# Patient Record
Sex: MALE | Race: WHITE | Employment: FULL TIME | URBAN - METROPOLITAN AREA
[De-identification: names, ages, dates, MRNs, and addresses within clinical notes are randomized per-mention and may not be internally consistent; named-entity substitution may affect disease eponyms.]

---

## 2018-03-12 ENCOUNTER — OFFICE VISIT (OUTPATIENT)
Dept: PRIMARY CARE CLINIC | Age: 53
End: 2018-03-12
Payer: COMMERCIAL

## 2018-03-12 VITALS
SYSTOLIC BLOOD PRESSURE: 102 MMHG | BODY MASS INDEX: 34.66 KG/M2 | WEIGHT: 234 LBS | HEIGHT: 69 IN | DIASTOLIC BLOOD PRESSURE: 78 MMHG | HEART RATE: 71 BPM | OXYGEN SATURATION: 95 % | TEMPERATURE: 97.2 F

## 2018-03-12 VITALS
SYSTOLIC BLOOD PRESSURE: 110 MMHG | DIASTOLIC BLOOD PRESSURE: 74 MMHG | WEIGHT: 230 LBS | HEART RATE: 72 BPM | BODY MASS INDEX: 33.97 KG/M2

## 2018-03-12 DIAGNOSIS — E78.5 HYPERLIPIDEMIA, UNSPECIFIED HYPERLIPIDEMIA TYPE: ICD-10-CM

## 2018-03-12 DIAGNOSIS — G47.33 OSA (OBSTRUCTIVE SLEEP APNEA): ICD-10-CM

## 2018-03-12 DIAGNOSIS — J45.30 MILD PERSISTENT ASTHMA, UNSPECIFIED WHETHER COMPLICATED: ICD-10-CM

## 2018-03-12 DIAGNOSIS — M19.90 ARTHRITIS: ICD-10-CM

## 2018-03-12 PROBLEM — J45.909 ASTHMA: Status: ACTIVE | Noted: 2018-03-12

## 2018-03-12 PROCEDURE — 99213 OFFICE O/P EST LOW 20 MIN: CPT | Performed by: INTERNAL MEDICINE

## 2018-03-12 RX ORDER — ALBUTEROL SULFATE 90 UG/1
2 AEROSOL, METERED RESPIRATORY (INHALATION) PRN
COMMUNITY
End: 2019-02-04

## 2018-03-12 ASSESSMENT — ENCOUNTER SYMPTOMS
HEMOPTYSIS: 0
SHORTNESS OF BREATH: 0
EYE DISCHARGE: 0
ORTHOPNEA: 0
NAUSEA: 0
BLOOD IN STOOL: 0
ABDOMINAL PAIN: 0
BLURRED VISION: 0

## 2018-03-12 ASSESSMENT — PATIENT HEALTH QUESTIONNAIRE - PHQ9
1. LITTLE INTEREST OR PLEASURE IN DOING THINGS: 0
SUM OF ALL RESPONSES TO PHQ QUESTIONS 1-9: 0
2. FEELING DOWN, DEPRESSED OR HOPELESS: 0
SUM OF ALL RESPONSES TO PHQ9 QUESTIONS 1 & 2: 0

## 2018-03-12 NOTE — PROGRESS NOTES
Psychiatric: His behavior is normal.       Labs :    Lab Results   Component Value Date    WBC 5.5 02/08/2018    HGB 15.7 02/08/2018    HCT 46.2 02/08/2018     02/08/2018    CHOL 236 (H) 01/02/2017    TRIG 202 (H) 01/02/2017    HDL 72 02/08/2018    ALT 28 02/08/2018    AST 29 02/08/2018     02/08/2018    K 5.0 02/08/2018     02/08/2018    CREATININE 1.0 02/08/2018    BUN 22 (H) 02/08/2018    CO2 31 (H) 02/08/2018    TSH 2.710 02/08/2018    PSA 0.48 02/08/2018    GLUF 89 02/08/2018     Lab Results   Component Value Date    COLORU Yellow 02/08/2018    NITRU Negative 02/08/2018    GLUCOSEU Negative 02/08/2018    KETUA TRACE 02/08/2018    UROBILINOGEN 0.2 02/08/2018    BILIRUBINUR Negative 02/08/2018     Lab Results   Component Value Date    PSA 0.48 02/08/2018             ASSESSMENT     Patient Active Problem List    Diagnosis Date Noted    Hyperlipemia 03/12/2018    Asthma 03/12/2018    Arthritis 03/12/2018    SELENA (obstructive sleep apnea) 03/12/2018    Wrist pain 12/15/2014    TFCC (triangular fibrocartilage complex) tear 12/15/2014    Elbow contusion 02/04/2014    Triceps tendon rupture 02/04/2014    Elbow pain 02/04/2014    Cubital tunnel syndrome 02/04/2014        Diagnosis:     ICD-10-CM ICD-9-CM    1. Hyperlipidemia, unspecified hyperlipidemia type E78.5 272.4    2. Mild persistent asthma, unspecified whether complicated Z45.47 834.38    3. Arthritis M19.90 716.90    4. SELENA (obstructive sleep apnea) G47.33 327.23        PLAN:           Patient Instructions   Low salt, Low Carb diet an low fat diet  Continue medications as advised and take them regularly  Regular exercises as advised    Discussed natural and expected course of this diagnosis and need to alert me if symptoms do not follow expected course, or if any worse. Return in about 3 months (around 6/12/2018).

## 2018-04-27 RX ORDER — FLUTICASONE FUROATE AND VILANTEROL 200; 25 UG/1; UG/1
1 POWDER RESPIRATORY (INHALATION) EVERY MORNING
Qty: 3 EACH | Refills: 1 | Status: SHIPPED | OUTPATIENT
Start: 2018-04-27 | End: 2019-06-10 | Stop reason: SDUPTHER

## 2018-05-21 ENCOUNTER — OFFICE VISIT (OUTPATIENT)
Dept: PRIMARY CARE CLINIC | Age: 53
End: 2018-05-21
Payer: COMMERCIAL

## 2018-05-21 VITALS
SYSTOLIC BLOOD PRESSURE: 126 MMHG | TEMPERATURE: 98.6 F | HEIGHT: 69 IN | BODY MASS INDEX: 34.07 KG/M2 | WEIGHT: 230 LBS | DIASTOLIC BLOOD PRESSURE: 82 MMHG | OXYGEN SATURATION: 97 % | HEART RATE: 68 BPM

## 2018-05-21 DIAGNOSIS — M54.2 NECK PAIN: ICD-10-CM

## 2018-05-21 DIAGNOSIS — G47.33 OSA (OBSTRUCTIVE SLEEP APNEA): ICD-10-CM

## 2018-05-21 DIAGNOSIS — J45.30 MILD PERSISTENT ASTHMA, UNSPECIFIED WHETHER COMPLICATED: ICD-10-CM

## 2018-05-21 DIAGNOSIS — E78.5 HYPERLIPIDEMIA, UNSPECIFIED HYPERLIPIDEMIA TYPE: Primary | ICD-10-CM

## 2018-05-21 DIAGNOSIS — M19.90 ARTHRITIS: ICD-10-CM

## 2018-05-21 PROCEDURE — 99213 OFFICE O/P EST LOW 20 MIN: CPT | Performed by: INTERNAL MEDICINE

## 2018-05-21 RX ORDER — MELOXICAM 15 MG/1
15 TABLET ORAL DAILY
Qty: 90 TABLET | Refills: 0 | Status: CANCELLED | OUTPATIENT
Start: 2018-05-21

## 2018-05-21 RX ORDER — QUETIAPINE FUMARATE 100 MG/1
50 TABLET, FILM COATED ORAL DAILY
Refills: 0 | COMMUNITY
Start: 2018-04-19 | End: 2019-08-26 | Stop reason: SDUPTHER

## 2018-05-21 ASSESSMENT — ENCOUNTER SYMPTOMS
NAUSEA: 0
EYE DISCHARGE: 0
SHORTNESS OF BREATH: 0
BLURRED VISION: 0
ORTHOPNEA: 0
HEMOPTYSIS: 0
BLOOD IN STOOL: 0
ABDOMINAL PAIN: 0

## 2018-05-25 ENCOUNTER — HOSPITAL ENCOUNTER (OUTPATIENT)
Dept: GENERAL RADIOLOGY | Age: 53
Discharge: HOME | End: 2018-05-27
Payer: COMMERCIAL

## 2018-05-25 ENCOUNTER — HOSPITAL ENCOUNTER (OUTPATIENT)
Age: 53
Discharge: HOME | End: 2018-05-27
Payer: COMMERCIAL

## 2018-05-25 DIAGNOSIS — M54.2 NECK PAIN: ICD-10-CM

## 2018-05-25 PROCEDURE — 72050 X-RAY EXAM NECK SPINE 4/5VWS: CPT

## 2018-05-26 ENCOUNTER — HOSPITAL ENCOUNTER (OUTPATIENT)
Age: 53
Discharge: HOME | End: 2018-05-26
Payer: COMMERCIAL

## 2018-05-26 LAB
ALBUMIN SERPL-MCNC: 4.2 G/DL (ref 3.5–5.2)
ALP BLD-CCNC: 48 U/L (ref 40–129)
ALT SERPL-CCNC: 22 U/L (ref 0–40)
ANION GAP SERPL CALCULATED.3IONS-SCNC: 12 MMOL/L (ref 7–16)
AST SERPL-CCNC: 28 U/L (ref 0–39)
BILIRUB SERPL-MCNC: 0.5 MG/DL (ref 0–1.2)
BUN BLDV-MCNC: 15 MG/DL (ref 6–20)
CALCIUM SERPL-MCNC: 8.7 MG/DL (ref 8.6–10.2)
CHLORIDE BLD-SCNC: 101 MMOL/L (ref 98–107)
CHOLESTEROL, FASTING: 205 MG/DL (ref 0–199)
CO2: 27 MMOL/L (ref 22–29)
CREAT SERPL-MCNC: 1.1 MG/DL (ref 0.7–1.2)
GFR AFRICAN AMERICAN: >60
GFR NON-AFRICAN AMERICAN: >60 ML/MIN/1.73
GLUCOSE FASTING: 80 MG/DL (ref 74–109)
HDLC SERPL-MCNC: 64 MG/DL
LDL CHOLESTEROL CALCULATED: 117 MG/DL (ref 0–99)
POTASSIUM SERPL-SCNC: 4.3 MMOL/L (ref 3.5–5)
SODIUM BLD-SCNC: 140 MMOL/L (ref 132–146)
TOTAL PROTEIN: 6.6 G/DL (ref 6.4–8.3)
TRIGLYCERIDE, FASTING: 118 MG/DL (ref 0–149)
VLDLC SERPL CALC-MCNC: 24 MG/DL

## 2018-05-26 PROCEDURE — 80061 LIPID PANEL: CPT

## 2018-05-26 PROCEDURE — 36415 COLL VENOUS BLD VENIPUNCTURE: CPT

## 2018-05-26 PROCEDURE — 80053 COMPREHEN METABOLIC PANEL: CPT

## 2018-06-25 ENCOUNTER — OFFICE VISIT (OUTPATIENT)
Dept: PRIMARY CARE CLINIC | Age: 53
End: 2018-06-25
Payer: COMMERCIAL

## 2018-06-25 VITALS
WEIGHT: 232 LBS | HEIGHT: 69 IN | SYSTOLIC BLOOD PRESSURE: 128 MMHG | BODY MASS INDEX: 34.36 KG/M2 | DIASTOLIC BLOOD PRESSURE: 80 MMHG | TEMPERATURE: 97.9 F | OXYGEN SATURATION: 97 % | HEART RATE: 68 BPM

## 2018-06-25 DIAGNOSIS — G47.33 OSA (OBSTRUCTIVE SLEEP APNEA): ICD-10-CM

## 2018-06-25 DIAGNOSIS — E78.5 HYPERLIPIDEMIA, UNSPECIFIED HYPERLIPIDEMIA TYPE: ICD-10-CM

## 2018-06-25 DIAGNOSIS — M19.90 ARTHRITIS: ICD-10-CM

## 2018-06-25 DIAGNOSIS — M54.2 NECK PAIN: ICD-10-CM

## 2018-06-25 DIAGNOSIS — M47.22 OSTEOARTHRITIS OF SPINE WITH RADICULOPATHY, CERVICAL REGION: Primary | ICD-10-CM

## 2018-06-25 PROCEDURE — 99213 OFFICE O/P EST LOW 20 MIN: CPT | Performed by: INTERNAL MEDICINE

## 2018-06-25 ASSESSMENT — ENCOUNTER SYMPTOMS
NAUSEA: 0
ORTHOPNEA: 0
SHORTNESS OF BREATH: 0
HEMOPTYSIS: 0
BLURRED VISION: 0
ABDOMINAL PAIN: 0
EYE DISCHARGE: 0
BLOOD IN STOOL: 0

## 2018-07-09 ENCOUNTER — OFFICE VISIT (OUTPATIENT)
Dept: PRIMARY CARE CLINIC | Age: 53
End: 2018-07-09
Payer: COMMERCIAL

## 2018-07-09 VITALS
DIASTOLIC BLOOD PRESSURE: 84 MMHG | BODY MASS INDEX: 34.07 KG/M2 | WEIGHT: 230 LBS | SYSTOLIC BLOOD PRESSURE: 124 MMHG | HEIGHT: 69 IN | OXYGEN SATURATION: 97 % | TEMPERATURE: 98.4 F | HEART RATE: 78 BPM

## 2018-07-09 DIAGNOSIS — J45.30 MILD PERSISTENT ASTHMA, UNSPECIFIED WHETHER COMPLICATED: ICD-10-CM

## 2018-07-09 DIAGNOSIS — G47.33 OSA (OBSTRUCTIVE SLEEP APNEA): ICD-10-CM

## 2018-07-09 DIAGNOSIS — M47.22 OSTEOARTHRITIS OF SPINE WITH RADICULOPATHY, CERVICAL REGION: Primary | ICD-10-CM

## 2018-07-09 DIAGNOSIS — E78.5 HYPERLIPIDEMIA, UNSPECIFIED HYPERLIPIDEMIA TYPE: ICD-10-CM

## 2018-07-09 DIAGNOSIS — M47.812 CERVICAL SPINE ARTHRITIS: ICD-10-CM

## 2018-07-09 PROCEDURE — 99213 OFFICE O/P EST LOW 20 MIN: CPT | Performed by: INTERNAL MEDICINE

## 2018-07-09 ASSESSMENT — ENCOUNTER SYMPTOMS
HEMOPTYSIS: 0
EYE DISCHARGE: 0
BLURRED VISION: 0
BLOOD IN STOOL: 0
SHORTNESS OF BREATH: 0
ABDOMINAL PAIN: 0
NAUSEA: 0
ORTHOPNEA: 0

## 2018-07-09 NOTE — PROGRESS NOTES
with patient. There are no Patient Instructions on file for this visit. Return in about 2 months (around 9/9/2018).

## 2018-09-05 ENCOUNTER — INITIAL CONSULT (OUTPATIENT)
Dept: NEUROSURGERY | Age: 53
End: 2018-09-05
Payer: COMMERCIAL

## 2018-09-05 VITALS
DIASTOLIC BLOOD PRESSURE: 63 MMHG | BODY MASS INDEX: 34.51 KG/M2 | SYSTOLIC BLOOD PRESSURE: 116 MMHG | HEART RATE: 62 BPM | WEIGHT: 233 LBS | HEIGHT: 69 IN

## 2018-09-05 DIAGNOSIS — M54.2 NECK PAIN: Primary | ICD-10-CM

## 2018-09-05 PROCEDURE — 99203 OFFICE O/P NEW LOW 30 MIN: CPT | Performed by: PHYSICIAN ASSISTANT

## 2018-09-05 NOTE — PROGRESS NOTES
lymphadenopathy   Abdomen soft, nontender  Pupils equal and reactive, no scleral icterus  EOMI bilaterally  Cranial nerves II-XII intact bilaterally  No drift  5/5 in BUE  5/5 in BLE  Sensation to LT intact x 4 ext  Toes going down  Skin warm and dry    Review of Imaging: MRI of Cervical Spine reviewed from outside facility. Assessment: Patient with foraminal stenosis at C5-C6, C6-C7. Stable. Plan:  -MRI reviewed with patient, no surgical intervention indicated at this time as he has no real radicular component.    -PT  -Pain Management  -RTC PRN

## 2018-09-05 NOTE — LETTER
[address] [facility] [address]  Phone: [phone]  Fax: [phone]    Eliana Mcnally MD        2018       Patient: Walter Nobles   MR Number: <[id number]>   YOB: 1965   Date of Visit: 2018       Dear Dr. Meeta Mckay:    Thank you for the request for consultation for Colby Peck to me for the evaluation. Below are the relevant portions of my assessment and plan of care. Vitals:    18 1441   BP: 116/63   Pulse: 62   Weight: 233 lb (105.7 kg)   Height: 5' 9\" (1.753 m)     Myrtle Jones     Patient: Walter Nobles  : 1965  MRN: [id number]    Date of Service: 2018    Reason for Referral: Neck Pain     History of Present Illness: This is a 46year old white male who presents with  Acute onset neck pain over the past several months. States the pain is sharp, pinching, 7/10, and constant. Admits to some relief of pain with activity modification. States the neck pain is only on the left side and radiates into the head causing moderate-severe headaches. Denies radicular pain into the arms, n/t, or weakness. No gait issues. No loss of bowel or bladder function. Denies trying PT or Pain Management. Allergies:   Patient has no known allergies. Past Medical History:      Diagnosis Date    Asthma     Bipolar 1 disorder ([facility]     Cardiomyopathy ([facility]     Depression     Hyperlipemia     Pneumonia     TFCC (triangular fibrocartilage complex) tear 12/15/2014       Surgical History:      Procedure Laterality Date    CARDIAC CATHETERIZATION      FINGER SURGERY Left     pinkie    HERNIA REPAIR      inguinal and umbical    OTHER SURGICAL HISTORY Right 2014    repair tricepts tondon       Social History:   reports that he quit smoking about 10 years ago. His smoking use included Cigarettes. He uses smokeless tobacco.   reports that he does not drink alcohol.

## 2018-10-08 RX ORDER — ROSUVASTATIN CALCIUM 10 MG/1
10 TABLET, COATED ORAL DAILY
Qty: 90 TABLET | Refills: 0 | Status: SHIPPED | OUTPATIENT
Start: 2018-10-08 | End: 2019-01-17 | Stop reason: SDUPTHER

## 2018-10-12 ENCOUNTER — OFFICE VISIT (OUTPATIENT)
Dept: PAIN MANAGEMENT | Age: 53
End: 2018-10-12
Payer: COMMERCIAL

## 2018-10-12 ENCOUNTER — PREP FOR PROCEDURE (OUTPATIENT)
Dept: PAIN MANAGEMENT | Age: 53
End: 2018-10-12

## 2018-10-12 VITALS
SYSTOLIC BLOOD PRESSURE: 116 MMHG | HEIGHT: 69 IN | TEMPERATURE: 98.2 F | WEIGHT: 226 LBS | HEART RATE: 54 BPM | OXYGEN SATURATION: 98 % | DIASTOLIC BLOOD PRESSURE: 68 MMHG | BODY MASS INDEX: 33.47 KG/M2 | RESPIRATION RATE: 16 BRPM

## 2018-10-12 DIAGNOSIS — M50.90 CERVICAL DISC DISORDER: Primary | ICD-10-CM

## 2018-10-12 DIAGNOSIS — M47.812 CERVICAL FACET JOINT SYNDROME: ICD-10-CM

## 2018-10-12 DIAGNOSIS — G89.4 CHRONIC PAIN SYNDROME: ICD-10-CM

## 2018-10-12 DIAGNOSIS — M47.812 SPONDYLOSIS OF CERVICAL REGION WITHOUT MYELOPATHY OR RADICULOPATHY: ICD-10-CM

## 2018-10-12 DIAGNOSIS — M47.812 CERVICAL FACET JOINT SYNDROME: Primary | ICD-10-CM

## 2018-10-12 PROCEDURE — 99204 OFFICE O/P NEW MOD 45 MIN: CPT | Performed by: PAIN MEDICINE

## 2018-10-12 RX ORDER — INFLUENZA A VIRUS A/SINGAPORE/GP1908/2015 IVR-180A (H1N1) ANTIGEN (PROPIOLACTONE INACTIVATED), INFLUENZA A VIRUS A/SINGAPORE/INFIMH-16-0019/2016 IVR-186 (H3N2) ANTIGEN (PROPIOLACTONE INACTIVATED), INFLUENZA B VIRUS B/MARYLAND/15/2016 ANTIGEN (PROPIOLACTONE INACTIVATED), AND INFLUENZA B VIRUS B/PHUKET/3073/2013 BVR-1B ANTIGEN (PROPIOLACTONE INACTIVATED) 15; 15; 15; 15 UG/.5ML; UG/.5ML; UG/.5ML; UG/.5ML
INJECTION, SUSPENSION INTRAMUSCULAR
Refills: 0 | COMMUNITY
Start: 2018-10-09 | End: 2018-10-12

## 2018-10-12 RX ORDER — SODIUM CHLORIDE 0.9 % (FLUSH) 0.9 %
10 SYRINGE (ML) INJECTION PRN
Status: CANCELLED | OUTPATIENT
Start: 2018-10-12 | End: 2019-10-12

## 2018-10-12 RX ORDER — DIVALPROEX SODIUM 500 MG/1
500 TABLET, EXTENDED RELEASE ORAL
Refills: 0 | COMMUNITY
Start: 2018-10-04 | End: 2018-10-12

## 2018-10-12 RX ORDER — SODIUM CHLORIDE 0.9 % (FLUSH) 0.9 %
10 SYRINGE (ML) INJECTION EVERY 12 HOURS SCHEDULED
Status: CANCELLED | OUTPATIENT
Start: 2018-10-12 | End: 2019-10-12

## 2018-10-26 ENCOUNTER — TELEPHONE (OUTPATIENT)
Dept: PAIN MANAGEMENT | Age: 53
End: 2018-10-26

## 2018-11-01 ENCOUNTER — HOSPITAL ENCOUNTER (OUTPATIENT)
Dept: OPERATING ROOM | Age: 53
Discharge: HOME | End: 2018-11-01
Payer: COMMERCIAL

## 2018-11-01 ENCOUNTER — HOSPITAL ENCOUNTER (OUTPATIENT)
Age: 53
Setting detail: OUTPATIENT SURGERY
Discharge: HOME | End: 2018-11-01
Attending: PAIN MEDICINE | Admitting: PAIN MEDICINE
Payer: COMMERCIAL

## 2018-11-01 VITALS
SYSTOLIC BLOOD PRESSURE: 121 MMHG | DIASTOLIC BLOOD PRESSURE: 81 MMHG | OXYGEN SATURATION: 96 % | RESPIRATION RATE: 16 BRPM | HEART RATE: 58 BPM

## 2018-11-01 DIAGNOSIS — M47.892 OTHER OSTEOARTHRITIS OF SPINE, CERVICAL REGION: ICD-10-CM

## 2018-11-01 PROCEDURE — 7100000010 HC PHASE II RECOVERY - FIRST 15 MIN: Performed by: PAIN MEDICINE

## 2018-11-01 PROCEDURE — 2709999900 HC NON-CHARGEABLE SUPPLY: Performed by: PAIN MEDICINE

## 2018-11-01 PROCEDURE — 3600000005 HC SURGERY LEVEL 5 BASE: Performed by: PAIN MEDICINE

## 2018-11-01 PROCEDURE — 64490 INJ PARAVERT F JNT C/T 1 LEV: CPT | Performed by: PAIN MEDICINE

## 2018-11-01 PROCEDURE — 64491 INJ PARAVERT F JNT C/T 2 LEV: CPT | Performed by: PAIN MEDICINE

## 2018-11-01 PROCEDURE — 6360000002 HC RX W HCPCS: Performed by: PAIN MEDICINE

## 2018-11-01 PROCEDURE — 2500000003 HC RX 250 WO HCPCS: Performed by: PAIN MEDICINE

## 2018-11-01 PROCEDURE — 7100000011 HC PHASE II RECOVERY - ADDTL 15 MIN: Performed by: PAIN MEDICINE

## 2018-11-01 PROCEDURE — 3209999900 FLUORO FOR SURGICAL PROCEDURES

## 2018-11-01 RX ORDER — LIDOCAINE HYDROCHLORIDE 5 MG/ML
INJECTION, SOLUTION INFILTRATION; INTRAVENOUS PRN
Status: DISCONTINUED | OUTPATIENT
Start: 2018-11-01 | End: 2018-11-01 | Stop reason: HOSPADM

## 2018-11-01 RX ORDER — SODIUM CHLORIDE 0.9 % (FLUSH) 0.9 %
10 SYRINGE (ML) INJECTION PRN
Status: DISCONTINUED | OUTPATIENT
Start: 2018-11-01 | End: 2018-11-01 | Stop reason: HOSPADM

## 2018-11-01 RX ORDER — SODIUM CHLORIDE 0.9 % (FLUSH) 0.9 %
10 SYRINGE (ML) INJECTION EVERY 12 HOURS SCHEDULED
Status: DISCONTINUED | OUTPATIENT
Start: 2018-11-01 | End: 2018-11-01 | Stop reason: HOSPADM

## 2018-11-01 ASSESSMENT — PAIN SCALES - GENERAL
PAINLEVEL_OUTOF10: 0
PAINLEVEL_OUTOF10: 0

## 2018-11-01 ASSESSMENT — PAIN DESCRIPTION - DESCRIPTORS: DESCRIPTORS: DISCOMFORT

## 2018-11-01 ASSESSMENT — PAIN - FUNCTIONAL ASSESSMENT: PAIN_FUNCTIONAL_ASSESSMENT: 0-10

## 2018-11-01 NOTE — OP NOTE
2018    Patient: Renea Celeste  :  1965  Age:  46 y.o. Sex:  male     PRE-PROCEDURE DIAGNOSIS: Left  Cervical facet syndrome, cervical spondylosis. POST-PROCEDURE DIAGNOSIS: Same. PROCEDURE: # 1  Left Cervical medial branch blocks at  Levels C3, C4 and C5    SURGEON: UCRLY Puente M.D. ANESTHESIA: Local    ESTIMATED BLOOD LOSS:  None.  ______________________________________________________________________  BRIEF HISTORY:  Rudy Valencia II comes in today for Left cervical facet medial branch block at levels C3, C4 and C5 . The potential complications of this procedure were discussed with him again today. He has elected to undergo the aforementioned procedure. Zahira complete History & Physical examination were reviewed in depth, a copy of which is in the chart. DESCRIPTION OF PROCEDURE:    After confirming written and informed consent, a time-out was performed and Zahira name and date of birth, the procedure to be performed as well as the plan for the location of the needle insertion were confirmed. The patient was brought into the procedure room and placed in the lateral recumbent on the fluoroscopy table. Standard monitors were placed and vital signs were observed throughout the procedure. The area of the cervical spine was prepped with chloraprep and draped in the usual sterile manner. Lateral fluoroscopy views were used to identify and nya the middle of the centroid of the facets of the targeted levels. The spinal needle was directed until bone was contacted at each level. After negative aspiration was confirmed, a solution of marcaine 0.25% and 40 mg DepoMedrol 1 cc was injected equally at the levels. The needles were removed and the patient body part was cleaned and bandage was placed over the needle insertion. Disposition the patient tolerated the procedure well and there were no complications . Vital signs remained stable throughout the procedure.  The patient was

## 2018-12-04 ENCOUNTER — HOSPITAL ENCOUNTER (EMERGENCY)
Age: 53
Discharge: HOME | End: 2018-12-04
Attending: EMERGENCY MEDICINE
Payer: COMMERCIAL

## 2018-12-04 ENCOUNTER — APPOINTMENT (OUTPATIENT)
Dept: CT IMAGING | Age: 53
End: 2018-12-04
Payer: COMMERCIAL

## 2018-12-04 VITALS
HEART RATE: 55 BPM | RESPIRATION RATE: 16 BRPM | SYSTOLIC BLOOD PRESSURE: 136 MMHG | OXYGEN SATURATION: 96 % | DIASTOLIC BLOOD PRESSURE: 73 MMHG | TEMPERATURE: 97.7 F | WEIGHT: 227 LBS | HEIGHT: 69 IN | BODY MASS INDEX: 33.62 KG/M2

## 2018-12-04 DIAGNOSIS — S13.9XXA CERVICAL SPRAIN, INITIAL ENCOUNTER: Primary | ICD-10-CM
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
## 2018-12-04 PROCEDURE — 96372 THER/PROPH/DIAG INJ SC/IM: CPT

## 2018-12-04 PROCEDURE — 6360000002 HC RX W HCPCS: Performed by: EMERGENCY MEDICINE

## 2018-12-04 PROCEDURE — 72125 CT NECK SPINE W/O DYE: CPT

## 2018-12-04 PROCEDURE — 99284 EMERGENCY DEPT VISIT MOD MDM: CPT

## 2018-12-04 RX ORDER — IBUPROFEN 800 MG/1
800 TABLET ORAL EVERY 8 HOURS PRN
Qty: 30 TABLET | Refills: 0 | Status: SHIPPED | OUTPATIENT
Start: 2018-12-04 | End: 2019-06-10 | Stop reason: ALTCHOICE

## 2018-12-04 RX ORDER — DEXAMETHASONE SODIUM PHOSPHATE 10 MG/ML
10 INJECTION, SOLUTION INTRAMUSCULAR; INTRAVENOUS ONCE
Status: COMPLETED | OUTPATIENT
Start: 2018-12-04 | End: 2018-12-04

## 2018-12-04 RX ORDER — KETOROLAC TROMETHAMINE 30 MG/ML
60 INJECTION, SOLUTION INTRAMUSCULAR; INTRAVENOUS ONCE
Status: COMPLETED | OUTPATIENT
Start: 2018-12-04 | End: 2018-12-04

## 2018-12-04 RX ORDER — CYCLOBENZAPRINE HCL 10 MG
10 TABLET ORAL 3 TIMES DAILY PRN
Qty: 15 TABLET | Refills: 0 | Status: SHIPPED | OUTPATIENT
Start: 2018-12-04 | End: 2018-12-09

## 2018-12-04 RX ADMIN — KETOROLAC TROMETHAMINE 60 MG: 30 INJECTION, SOLUTION INTRAMUSCULAR at 18:49

## 2018-12-04 RX ADMIN — DEXAMETHASONE SODIUM PHOSPHATE 10 MG: 10 INJECTION, SOLUTION INTRAMUSCULAR; INTRAVENOUS at 18:49

## 2018-12-04 ASSESSMENT — ENCOUNTER SYMPTOMS
EYE DISCHARGE: 0
WHEEZING: 0
BACK PAIN: 0
EYE PAIN: 0
NAUSEA: 0
SINUS PRESSURE: 0
SHORTNESS OF BREATH: 0
VOMITING: 0
DIARRHEA: 0
COUGH: 0
SORE THROAT: 0
EYE REDNESS: 0
ABDOMINAL PAIN: 0

## 2018-12-04 ASSESSMENT — PAIN DESCRIPTION - PAIN TYPE: TYPE: CHRONIC PAIN

## 2018-12-04 ASSESSMENT — PAIN DESCRIPTION - LOCATION: LOCATION: BACK

## 2018-12-04 ASSESSMENT — PAIN SCALES - GENERAL: PAINLEVEL_OUTOF10: 5

## 2018-12-04 NOTE — ED PROVIDER NOTES
spine fracture. 2. Mild to moderate multilevel cervical spondylosis. 3. Nonspecific reversal of cervical lordosis. Ct Cervical Spine Wo Contrast    Result Date: 12/4/2018  Location: 200 Indication: Neck pain. Comparison: Cervical spine radiograph from 5/25/2018. Technique: Multidetector CT imaging of the cervical spine was performed without administration of intravenous contrast. Coronal and sagittal reformatted images were obtained. Automated dose exposure control was used for this exam. Findings: There is slight nonspecific reversal of cervical lordosis. Vertebral bodies maintain normal height. Alignment is maintained. Atlantodental distance is preserved. The craniocervical junction is normal in appearance. No acute cervical spine fracture is identified. There is no prevertebral soft tissue edema. There is mild multilevel intervertebral disc space narrowing with hypertrophic endplate changes, most pronounced at C5-C6 and C6-C7. There is multilevel uncovertebral hypertrophy resulting in mild multilevel foraminal narrowing. There is bilateral facet hypertrophy at C2-C3, left greater than right, resulting in moderate left foraminal narrowing. Visualized portions of bilateral lung apices are grossly clear. 1. No acute cervical spine fracture. 2. Mild to moderate multilevel cervical spondylosis. 3. Nonspecific reversal of cervical lordosis. ------------------------- NURSING NOTES AND VITALS REVIEWED ---------------------------  Date / Time Roomed:  12/4/2018  5:45 PM  ED Bed Assignment:  21/21    The nursing notes within the ED encounter and vital signs as below have been reviewed.    /73   Pulse 55   Temp 97.7 °F (36.5 °C) (Oral)   Resp 16   Ht 5' 9\" (1.753 m)   Wt 227 lb (103 kg)   SpO2 96%   BMI 33.52 kg/m²   Oxygen Saturation Interpretation: Normal      ------------------------------------------ PROGRESS NOTES ------------------------------------------  I have spoken with the

## 2018-12-11 ENCOUNTER — OFFICE VISIT (OUTPATIENT)
Dept: PRIMARY CARE CLINIC | Age: 53
End: 2018-12-11
Payer: COMMERCIAL

## 2018-12-11 VITALS
WEIGHT: 227 LBS | DIASTOLIC BLOOD PRESSURE: 84 MMHG | TEMPERATURE: 97 F | BODY MASS INDEX: 33.62 KG/M2 | SYSTOLIC BLOOD PRESSURE: 124 MMHG | HEIGHT: 69 IN | OXYGEN SATURATION: 98 % | HEART RATE: 78 BPM

## 2018-12-11 DIAGNOSIS — G47.33 OSA (OBSTRUCTIVE SLEEP APNEA): ICD-10-CM

## 2018-12-11 DIAGNOSIS — Z12.11 COLON CANCER SCREENING: ICD-10-CM

## 2018-12-11 DIAGNOSIS — E78.5 HYPERLIPIDEMIA, UNSPECIFIED HYPERLIPIDEMIA TYPE: Primary | ICD-10-CM

## 2018-12-11 DIAGNOSIS — J45.30 MILD PERSISTENT ASTHMA, UNSPECIFIED WHETHER COMPLICATED: ICD-10-CM

## 2018-12-11 DIAGNOSIS — M47.812 CERVICAL FACET JOINT SYNDROME: ICD-10-CM

## 2018-12-11 DIAGNOSIS — M50.90 CERVICAL DISC DISORDER: ICD-10-CM

## 2018-12-11 PROCEDURE — 99213 OFFICE O/P EST LOW 20 MIN: CPT | Performed by: INTERNAL MEDICINE

## 2018-12-11 RX ORDER — DIVALPROEX SODIUM 500 MG/1
TABLET, EXTENDED RELEASE ORAL
Refills: 0 | COMMUNITY
Start: 2018-11-29 | End: 2019-08-26 | Stop reason: SDUPTHER

## 2018-12-11 ASSESSMENT — ENCOUNTER SYMPTOMS
NAUSEA: 0
BLOOD IN STOOL: 0
SHORTNESS OF BREATH: 0
ABDOMINAL PAIN: 0
EYE DISCHARGE: 0

## 2018-12-11 ASSESSMENT — PATIENT HEALTH QUESTIONNAIRE - PHQ9
SUM OF ALL RESPONSES TO PHQ9 QUESTIONS 1 & 2: 0
SUM OF ALL RESPONSES TO PHQ QUESTIONS 1-9: 0
1. LITTLE INTEREST OR PLEASURE IN DOING THINGS: 0
2. FEELING DOWN, DEPRESSED OR HOPELESS: 0
SUM OF ALL RESPONSES TO PHQ QUESTIONS 1-9: 0

## 2018-12-11 NOTE — PROGRESS NOTES
Chief Complaint   Patient presents with    Hyperlipidemia    Arthritis     patient currently getting steroid injection cervical region/ pain clinic       HPI:  Patient is here for follow-up . Had visit and procedure with Pain clinic. Pt reports that he does not feel much improvement in his pain level     Allergy and Medications are reviewed and updated. Past Medical History, Surgical History, and Family History has been reviewed and updated. Review of Systems:  Review of Systems   Constitutional: Negative for chills and fever. HENT: Negative for congestion and ear pain. Eyes: Negative for discharge. Respiratory: Negative for shortness of breath (No new SOB). Cardiovascular: Negative for chest pain and leg swelling. Gastrointestinal: Negative for abdominal pain, blood in stool and nausea. Endocrine: Negative for polydipsia. Genitourinary: Negative for flank pain and hematuria. Musculoskeletal: Positive for neck pain (Chronic). Negative for myalgias. Skin: Negative for rash. Neurological: Negative for dizziness and seizures. Hematological: Does not bruise/bleed easily. Psychiatric/Behavioral: Negative for hallucinations and suicidal ideas. Vitals:    12/11/18 1023   BP: 124/84   Pulse: 78   Temp: 97 °F (36.1 °C)   TempSrc: Oral   SpO2: 98%   Weight: 227 lb (103 kg)   Height: 5' 9\" (1.753 m)       Physical Exam   Constitutional: He is oriented to person, place, and time. He appears well-developed and well-nourished. HENT:   Head: Normocephalic and atraumatic. Mouth/Throat: Oropharynx is clear and moist.   Eyes: Pupils are equal, round, and reactive to light. Conjunctivae are normal.   Neck: No JVD present. Cardiovascular: Normal rate and regular rhythm. Pulmonary/Chest: Effort normal and breath sounds normal. He has no rales. Abdominal: Soft. Bowel sounds are normal.   Musculoskeletal: Normal range of motion. Lymphadenopathy:     He has no cervical adenopathy.

## 2018-12-26 ENCOUNTER — OFFICE VISIT (OUTPATIENT)
Dept: PAIN MANAGEMENT | Age: 53
End: 2018-12-26
Payer: COMMERCIAL

## 2018-12-26 ENCOUNTER — PREP FOR PROCEDURE (OUTPATIENT)
Dept: PAIN MANAGEMENT | Age: 53
End: 2018-12-26

## 2018-12-26 VITALS
BODY MASS INDEX: 34.07 KG/M2 | DIASTOLIC BLOOD PRESSURE: 80 MMHG | TEMPERATURE: 98.1 F | WEIGHT: 230 LBS | SYSTOLIC BLOOD PRESSURE: 124 MMHG | RESPIRATION RATE: 18 BRPM | HEART RATE: 84 BPM | OXYGEN SATURATION: 95 % | HEIGHT: 69 IN

## 2018-12-26 DIAGNOSIS — G89.4 CHRONIC PAIN SYNDROME: ICD-10-CM

## 2018-12-26 DIAGNOSIS — M50.90 CERVICAL DISC DISORDER: ICD-10-CM

## 2018-12-26 DIAGNOSIS — M47.812 SPONDYLOSIS OF CERVICAL REGION WITHOUT MYELOPATHY OR RADICULOPATHY: ICD-10-CM

## 2018-12-26 DIAGNOSIS — M47.812 CERVICAL FACET JOINT SYNDROME: Primary | ICD-10-CM

## 2018-12-26 PROCEDURE — 99213 OFFICE O/P EST LOW 20 MIN: CPT | Performed by: PAIN MEDICINE

## 2018-12-26 NOTE — PROGRESS NOTES
[facility]  [address] [facility], [address] [place] [address] Wilberto  [phone]    Follow up Note      Dane Sarah OROZCO     Date of Visit:  12/26/2018    CC:  Patient presents for follow up   Chief Complaint   Patient presents with    Neck Pain     HPI:    Pain got better after procedure for 2 weeks  Change in quality of symptoms:no. Medication side effects:not applicable . Recent diagnostic testing:none. Recent interventional procedures:Left C3,4,5 MBB with excellent response 60-70%. .    He has not been on anticoagulation medications to include none and has not been on herbal supplements. He is not diabetic.     Imaging:   Cervical spine Xray 05/2018  Multilevel cervical spondylosis, most pronounced at C5-6 and C6-7, where there is suggestion of moderate Left foraminal narrowing      Previous treatments: Physical Therapy and medications. .          Potential Aberrant Drug-Related Behavior: No    Urine Drug Screening:  None, no opioids started     OARRS report:  12/2018 consistent     Past Medical History:   Diagnosis Date    Asthma     [facility] disorder ([facility] [facility]     Cardiomyopathy ([facility]     Depression     Hyperlipemia     Pneumonia     TFCC (triangular fibrocartilage complex) tear 12/15/2014     Past Surgical History:   Procedure Laterality Date    CARDIAC CATHETERIZATION      FINGER SURGERY Left 1983    pinkie    HERNIA REPAIR      inguinal and umbical    NERVE BLOCK Left 11/01/2018    medial branch block    OTHER SURGICAL HISTORY Right feb 2014    repair tricepts tondon    IN INJ DX/THER AGNT PARAVERT FACET JOINT, CERV/THORAC, 1ST LEVEL Left 11/1/2018    LEFT C3 C4 C5 MEDIAL BRANCH BLOCK performed by Benoit Askew MD at [address] [place] [address]     Prior to Admission medications    Medication Sig Start Date End Date Taking?  Authorizing Provider   divalproex (DEPAKOTE ER) 500 MG extended release tablet take 1 tablet by mouth every morning AND 2 tablets by mouth at bedtime 11/29/18  Yes Historical Provider, MD   rosuvastatin (CRESTOR) 10 MG tablet Take 1 tablet by mouth daily 10/8/18  Yes Chandrika Brady MD   QUEtiapine (SEROQUEL) 100 MG tablet  4/19/18  Yes Historical Provider, MD   Fluticasone Furoate-Vilanterol (BREO ELLIPTA) 200-25 MCG/INH AEPB Inhale 1 puff into the lungs every morning 4/27/18  Yes Chandrika Brady MD   albuterol sulfate  (90 Base) MCG/ACT inhaler Inhale 2 puffs into the lungs as needed for Wheezing   Yes Historical Provider, MD   esomeprazole (NEXIUM) 40 MG capsule Take 40 mg by mouth every morning (before breakfast). Yes Historical Provider, MD   ibuprofen (IBU) 800 MG tablet Take 1 tablet by mouth every 8 hours as needed for Pain 12/4/18 12/14/18  Kamran Donovan MD     No Known Allergies    Social History     Social History    Marital status:      Spouse name: N/A    Number of children: N/A    Years of education: N/A     Occupational History    Not on file. Social History Main Topics    Smoking status: Former Smoker     Types: Cigarettes     Quit date: 1/19/2008    Smokeless tobacco: Current User    Alcohol use No    Drug use: No    Sexual activity: Yes     Partners: Female     Other Topics Concern    Not on file     Social History Narrative    No narrative on file     History reviewed. No pertinent family history. REVIEW OF SYSTEMS:     Francy Lezama denies fever/chills, chest pain, shortness of breath, new bowel or bladder complaints. All other review of systems was negative. PHYSICAL EXAMINATION:      /80   Pulse 84   Temp 98.1 °F (36.7 °C) (Oral)   Resp 18   Ht 5' 9\" (1.753 m)   Wt 230 lb (104.3 kg)   SpO2 95%   BMI 33.97 kg/m²        General:       General appearance:   pleasant and well-hydrated.    , in moderate discomfort and A & O x3  Build:Overweight  Function:Rises from a seated position easily.     HEENT:     Head:normocephalic and atraumatic  Pupils:regular, round and equal.  Sclera: icterus absent,

## 2019-01-17 RX ORDER — ROSUVASTATIN CALCIUM 10 MG/1
10 TABLET, COATED ORAL DAILY
Qty: 90 TABLET | Refills: 0 | Status: SHIPPED | OUTPATIENT
Start: 2019-01-17 | End: 2019-06-10 | Stop reason: ALTCHOICE

## 2019-01-23 ENCOUNTER — TELEPHONE (OUTPATIENT)
Dept: PAIN MANAGEMENT | Age: 54
End: 2019-01-23

## 2019-01-24 ENCOUNTER — HOSPITAL ENCOUNTER (OUTPATIENT)
Age: 54
Setting detail: OUTPATIENT SURGERY
Discharge: HOME | End: 2019-01-24
Attending: PAIN MEDICINE | Admitting: PAIN MEDICINE
Payer: COMMERCIAL

## 2019-01-24 VITALS
SYSTOLIC BLOOD PRESSURE: 113 MMHG | HEART RATE: 74 BPM | RESPIRATION RATE: 16 BRPM | OXYGEN SATURATION: 95 % | DIASTOLIC BLOOD PRESSURE: 69 MMHG

## 2019-01-24 DIAGNOSIS — M47.892 OTHER OSTEOARTHRITIS OF SPINE, CERVICAL REGION: ICD-10-CM

## 2019-01-24 PROCEDURE — 2709999900 HC NON-CHARGEABLE SUPPLY: Performed by: PAIN MEDICINE

## 2019-01-24 PROCEDURE — 64490 INJ PARAVERT F JNT C/T 1 LEV: CPT | Performed by: PAIN MEDICINE

## 2019-01-24 PROCEDURE — 2500000003 HC RX 250 WO HCPCS: Performed by: PAIN MEDICINE

## 2019-01-24 PROCEDURE — 64491 INJ PARAVERT F JNT C/T 2 LEV: CPT | Performed by: PAIN MEDICINE

## 2019-01-24 PROCEDURE — 6360000002 HC RX W HCPCS: Performed by: PAIN MEDICINE

## 2019-01-24 PROCEDURE — 7100000010 HC PHASE II RECOVERY - FIRST 15 MIN: Performed by: PAIN MEDICINE

## 2019-01-24 PROCEDURE — 3600000005 HC SURGERY LEVEL 5 BASE: Performed by: PAIN MEDICINE

## 2019-01-24 RX ORDER — LIDOCAINE HYDROCHLORIDE 5 MG/ML
INJECTION, SOLUTION INFILTRATION; INTRAVENOUS PRN
Status: DISCONTINUED | OUTPATIENT
Start: 2019-01-24 | End: 2019-01-24 | Stop reason: HOSPADM

## 2019-01-24 ASSESSMENT — PAIN SCALES - GENERAL
PAINLEVEL_OUTOF10: 0
PAINLEVEL_OUTOF10: 0

## 2019-01-24 ASSESSMENT — PAIN - FUNCTIONAL ASSESSMENT: PAIN_FUNCTIONAL_ASSESSMENT: 0-10

## 2019-02-04 ENCOUNTER — HOSPITAL ENCOUNTER (EMERGENCY)
Age: 54
Discharge: HOME | End: 2019-02-04
Payer: COMMERCIAL

## 2019-02-04 ENCOUNTER — HOSPITAL ENCOUNTER (OUTPATIENT)
Age: 54
Discharge: HOME | End: 2019-02-04
Payer: COMMERCIAL

## 2019-02-04 VITALS
RESPIRATION RATE: 20 BRPM | SYSTOLIC BLOOD PRESSURE: 118 MMHG | OXYGEN SATURATION: 98 % | HEART RATE: 73 BPM | TEMPERATURE: 98 F | DIASTOLIC BLOOD PRESSURE: 83 MMHG

## 2019-02-04 DIAGNOSIS — J20.9 ACUTE BRONCHITIS, UNSPECIFIED ORGANISM: Primary | ICD-10-CM

## 2019-02-04 DIAGNOSIS — E78.5 HYPERLIPIDEMIA, UNSPECIFIED HYPERLIPIDEMIA TYPE: ICD-10-CM

## 2019-02-04 LAB
ALBUMIN SERPL-MCNC: 4.3 G/DL (ref 3.5–5.2)
ALP BLD-CCNC: 107 U/L (ref 40–129)
ALT SERPL-CCNC: 109 U/L (ref 0–40)
ANION GAP SERPL CALCULATED.3IONS-SCNC: 15 MMOL/L (ref 7–16)
AST SERPL-CCNC: 55 U/L (ref 0–39)
BACTERIA: ABNORMAL /HPF
BASOPHILS ABSOLUTE: 0.04 E9/L (ref 0–0.2)
BASOPHILS RELATIVE PERCENT: 0.5 % (ref 0–2)
BILIRUB SERPL-MCNC: 0.3 MG/DL (ref 0–1.2)
BILIRUBIN URINE: NEGATIVE
BLOOD, URINE: ABNORMAL
BUN BLDV-MCNC: 16 MG/DL (ref 6–20)
CALCIUM SERPL-MCNC: 9.3 MG/DL (ref 8.6–10.2)
CHLORIDE BLD-SCNC: 97 MMOL/L (ref 98–107)
CHOLESTEROL, FASTING: 178 MG/DL (ref 0–199)
CLARITY: CLEAR
CO2: 28 MMOL/L (ref 22–29)
COLOR: YELLOW
CREAT SERPL-MCNC: 1.1 MG/DL (ref 0.7–1.2)
EOSINOPHILS ABSOLUTE: 0.19 E9/L (ref 0.05–0.5)
EOSINOPHILS RELATIVE PERCENT: 2.3 % (ref 0–6)
EPITHELIAL CELLS, UA: ABNORMAL /HPF
GFR AFRICAN AMERICAN: >60
GFR NON-AFRICAN AMERICAN: >60 ML/MIN/1.73
GLUCOSE FASTING: 90 MG/DL (ref 74–99)
GLUCOSE URINE: NEGATIVE MG/DL
HCT VFR BLD CALC: 46.3 % (ref 37–54)
HDLC SERPL-MCNC: 61 MG/DL
HEMOGLOBIN: 15.9 G/DL (ref 12.5–16.5)
IMMATURE GRANULOCYTES #: 0.03 E9/L
IMMATURE GRANULOCYTES %: 0.4 % (ref 0–5)
KETONES, URINE: ABNORMAL MG/DL
LDL CHOLESTEROL CALCULATED: 93 MG/DL (ref 0–99)
LEUKOCYTE ESTERASE, URINE: NEGATIVE
LYMPHOCYTES ABSOLUTE: 1.35 E9/L (ref 1.5–4)
LYMPHOCYTES RELATIVE PERCENT: 16.3 % (ref 20–42)
MCH RBC QN AUTO: 30.9 PG (ref 26–35)
MCHC RBC AUTO-ENTMCNC: 34.3 % (ref 32–34.5)
MCV RBC AUTO: 90.1 FL (ref 80–99.9)
MONOCYTES ABSOLUTE: 0.79 E9/L (ref 0.1–0.95)
MONOCYTES RELATIVE PERCENT: 9.6 % (ref 2–12)
NEUTROPHILS ABSOLUTE: 5.87 E9/L (ref 1.8–7.3)
NEUTROPHILS RELATIVE PERCENT: 70.9 % (ref 43–80)
NITRITE, URINE: NEGATIVE
PDW BLD-RTO: 12.7 FL (ref 11.5–15)
PH UA: 6 (ref 5–9)
PLATELET # BLD: 197 E9/L (ref 130–450)
PMV BLD AUTO: 9.8 FL (ref 7–12)
POTASSIUM SERPL-SCNC: 4.6 MMOL/L (ref 3.5–5)
PROTEIN UA: ABNORMAL MG/DL
RBC # BLD: 5.14 E12/L (ref 3.8–5.8)
RBC UA: ABNORMAL /HPF (ref 0–2)
SODIUM BLD-SCNC: 140 MMOL/L (ref 132–146)
SPECIFIC GRAVITY UA: 1.02 (ref 1–1.03)
TOTAL PROTEIN: 7.7 G/DL (ref 6.4–8.3)
TRIGLYCERIDE, FASTING: 119 MG/DL (ref 0–149)
TSH SERPL DL<=0.05 MIU/L-ACNC: 3.2 UIU/ML (ref 0.27–4.2)
UROBILINOGEN, URINE: 0.2 E.U./DL
VLDLC SERPL CALC-MCNC: 24 MG/DL
WBC # BLD: 8.3 E9/L (ref 4.5–11.5)
WBC UA: ABNORMAL /HPF (ref 0–5)

## 2019-02-04 PROCEDURE — 85025 COMPLETE CBC W/AUTO DIFF WBC: CPT

## 2019-02-04 PROCEDURE — 80061 LIPID PANEL: CPT

## 2019-02-04 PROCEDURE — 84443 ASSAY THYROID STIM HORMONE: CPT

## 2019-02-04 PROCEDURE — 99212 OFFICE O/P EST SF 10 MIN: CPT

## 2019-02-04 PROCEDURE — 81001 URINALYSIS AUTO W/SCOPE: CPT

## 2019-02-04 PROCEDURE — 80053 COMPREHEN METABOLIC PANEL: CPT

## 2019-02-04 PROCEDURE — 36415 COLL VENOUS BLD VENIPUNCTURE: CPT

## 2019-02-04 RX ORDER — PREDNISONE 20 MG/1
TABLET ORAL
Qty: 10 TABLET | Refills: 0 | Status: SHIPPED | OUTPATIENT
Start: 2019-02-04 | End: 2019-06-08 | Stop reason: ALTCHOICE

## 2019-02-04 RX ORDER — DOXYCYCLINE 100 MG/1
100 CAPSULE ORAL 2 TIMES DAILY
Qty: 14 CAPSULE | Refills: 0 | Status: SHIPPED | OUTPATIENT
Start: 2019-02-04 | End: 2019-02-11

## 2019-02-04 RX ORDER — ALBUTEROL SULFATE 90 UG/1
2 AEROSOL, METERED RESPIRATORY (INHALATION) PRN
Qty: 1 INHALER | Refills: 0 | Status: SHIPPED | OUTPATIENT
Start: 2019-02-04 | End: 2021-01-13 | Stop reason: SDUPTHER

## 2019-02-07 ENCOUNTER — OFFICE VISIT (OUTPATIENT)
Dept: PRIMARY CARE CLINIC | Age: 54
End: 2019-02-07
Payer: COMMERCIAL

## 2019-02-07 VITALS
TEMPERATURE: 98.7 F | DIASTOLIC BLOOD PRESSURE: 78 MMHG | WEIGHT: 233 LBS | HEIGHT: 69 IN | OXYGEN SATURATION: 96 % | HEART RATE: 78 BPM | BODY MASS INDEX: 34.51 KG/M2 | SYSTOLIC BLOOD PRESSURE: 118 MMHG

## 2019-02-07 DIAGNOSIS — J45.21 MILD INTERMITTENT ASTHMATIC BRONCHITIS WITH ACUTE EXACERBATION: Primary | ICD-10-CM

## 2019-02-07 DIAGNOSIS — J45.30 MILD PERSISTENT ASTHMA, UNSPECIFIED WHETHER COMPLICATED: ICD-10-CM

## 2019-02-07 DIAGNOSIS — E78.5 HYPERLIPIDEMIA, UNSPECIFIED HYPERLIPIDEMIA TYPE: ICD-10-CM

## 2019-02-07 PROCEDURE — 99213 OFFICE O/P EST LOW 20 MIN: CPT | Performed by: INTERNAL MEDICINE

## 2019-02-07 RX ORDER — IPRATROPIUM BROMIDE AND ALBUTEROL SULFATE 2.5; .5 MG/3ML; MG/3ML
1 SOLUTION RESPIRATORY (INHALATION) EVERY 6 HOURS PRN
COMMUNITY
End: 2019-02-07 | Stop reason: SDUPTHER

## 2019-02-07 RX ORDER — IPRATROPIUM BROMIDE AND ALBUTEROL SULFATE 2.5; .5 MG/3ML; MG/3ML
1 SOLUTION RESPIRATORY (INHALATION) EVERY 6 HOURS
Qty: 120 VIAL | Refills: 2 | Status: SHIPPED | OUTPATIENT
Start: 2019-02-07 | End: 2019-06-10 | Stop reason: SDUPTHER

## 2019-02-07 RX ORDER — DEXTROMETHORPHAN HYDROBROMIDE AND PROMETHAZINE HYDROCHLORIDE 15; 6.25 MG/5ML; MG/5ML
5 SYRUP ORAL 4 TIMES DAILY PRN
Qty: 180 ML | Refills: 0 | Status: SHIPPED | OUTPATIENT
Start: 2019-02-07 | End: 2019-02-14

## 2019-02-07 RX ORDER — METHYLPREDNISOLONE 4 MG/1
TABLET ORAL
Qty: 1 KIT | Refills: 0 | Status: SHIPPED | OUTPATIENT
Start: 2019-02-07 | End: 2019-06-08 | Stop reason: ALTCHOICE

## 2019-02-07 ASSESSMENT — PATIENT HEALTH QUESTIONNAIRE - PHQ9
SUM OF ALL RESPONSES TO PHQ9 QUESTIONS 1 & 2: 0
SUM OF ALL RESPONSES TO PHQ QUESTIONS 1-9: 0
2. FEELING DOWN, DEPRESSED OR HOPELESS: 0
SUM OF ALL RESPONSES TO PHQ QUESTIONS 1-9: 0
1. LITTLE INTEREST OR PLEASURE IN DOING THINGS: 0

## 2019-02-07 ASSESSMENT — ENCOUNTER SYMPTOMS
BLOOD IN STOOL: 0
NAUSEA: 0
EYE DISCHARGE: 0
COUGH: 1
WHEEZING: 1
SHORTNESS OF BREATH: 1
ABDOMINAL PAIN: 0

## 2019-02-25 ENCOUNTER — TELEPHONE (OUTPATIENT)
Dept: FAMILY MEDICINE CLINIC | Age: 54
End: 2019-02-25

## 2019-06-08 ENCOUNTER — HOSPITAL ENCOUNTER (EMERGENCY)
Age: 54
Discharge: HOME | End: 2019-06-08
Payer: COMMERCIAL

## 2019-06-08 ENCOUNTER — APPOINTMENT (OUTPATIENT)
Dept: GENERAL RADIOLOGY | Age: 54
End: 2019-06-08
Payer: COMMERCIAL

## 2019-06-08 VITALS
SYSTOLIC BLOOD PRESSURE: 100 MMHG | BODY MASS INDEX: 33.23 KG/M2 | OXYGEN SATURATION: 93 % | RESPIRATION RATE: 20 BRPM | DIASTOLIC BLOOD PRESSURE: 62 MMHG | TEMPERATURE: 98.1 F | HEART RATE: 96 BPM | WEIGHT: 225 LBS

## 2019-06-08 DIAGNOSIS — J40 BRONCHITIS: ICD-10-CM

## 2019-06-08 DIAGNOSIS — J45.909 ASTHMA, UNSPECIFIED ASTHMA SEVERITY, UNSPECIFIED WHETHER COMPLICATED, UNSPECIFIED WHETHER PERSISTENT: Primary | ICD-10-CM

## 2019-06-08 PROCEDURE — 99213 OFFICE O/P EST LOW 20 MIN: CPT

## 2019-06-08 PROCEDURE — 6360000002 HC RX W HCPCS: Performed by: NURSE PRACTITIONER

## 2019-06-08 PROCEDURE — 94640 AIRWAY INHALATION TREATMENT: CPT

## 2019-06-08 PROCEDURE — 96372 THER/PROPH/DIAG INJ SC/IM: CPT

## 2019-06-08 PROCEDURE — 6370000000 HC RX 637 (ALT 250 FOR IP): Performed by: NURSE PRACTITIONER

## 2019-06-08 PROCEDURE — 71046 X-RAY EXAM CHEST 2 VIEWS: CPT

## 2019-06-08 RX ORDER — DOXYCYCLINE HYCLATE 100 MG
100 TABLET ORAL 2 TIMES DAILY
Qty: 20 TABLET | Refills: 0 | Status: SHIPPED | OUTPATIENT
Start: 2019-06-08 | End: 2019-06-18

## 2019-06-08 RX ORDER — BENZONATATE 200 MG/1
200 CAPSULE ORAL 3 TIMES DAILY PRN
Qty: 15 CAPSULE | Refills: 0 | Status: SHIPPED | OUTPATIENT
Start: 2019-06-08 | End: 2019-06-13

## 2019-06-08 RX ORDER — METHYLPREDNISOLONE SODIUM SUCCINATE 125 MG/2ML
125 INJECTION, POWDER, LYOPHILIZED, FOR SOLUTION INTRAMUSCULAR; INTRAVENOUS ONCE
Status: COMPLETED | OUTPATIENT
Start: 2019-06-08 | End: 2019-06-08

## 2019-06-08 RX ORDER — IPRATROPIUM BROMIDE AND ALBUTEROL SULFATE 2.5; .5 MG/3ML; MG/3ML
1 SOLUTION RESPIRATORY (INHALATION) ONCE
Status: COMPLETED | OUTPATIENT
Start: 2019-06-08 | End: 2019-06-08

## 2019-06-08 RX ORDER — PREDNISONE 20 MG/1
40 TABLET ORAL DAILY
Qty: 10 TABLET | Refills: 0 | Status: SHIPPED | OUTPATIENT
Start: 2019-06-08 | End: 2019-06-13

## 2019-06-08 RX ADMIN — IBUPROFEN 600 MG: 200 TABLET, FILM COATED ORAL at 09:49

## 2019-06-08 RX ADMIN — METHYLPREDNISOLONE SODIUM SUCCINATE 125 MG: 125 INJECTION, POWDER, FOR SOLUTION INTRAMUSCULAR; INTRAVENOUS at 09:01

## 2019-06-08 RX ADMIN — IPRATROPIUM BROMIDE AND ALBUTEROL SULFATE 1 AMPULE: .5; 3 SOLUTION RESPIRATORY (INHALATION) at 09:49

## 2019-06-08 RX ADMIN — IPRATROPIUM BROMIDE AND ALBUTEROL SULFATE 1 AMPULE: .5; 3 SOLUTION RESPIRATORY (INHALATION) at 09:01

## 2019-06-08 ASSESSMENT — PAIN SCALES - GENERAL
PAINLEVEL_OUTOF10: 7
PAINLEVEL_OUTOF10: 7

## 2019-06-08 ASSESSMENT — PAIN DESCRIPTION - LOCATION: LOCATION: GENERALIZED

## 2019-06-08 ASSESSMENT — PAIN DESCRIPTION - DESCRIPTORS: DESCRIPTORS: ACHING

## 2019-06-08 NOTE — ED NOTES
2nd duoneb aerosol ended. Pt tolerated well. States he feels better. Pulse ox 95% pulse 104. Provider notified.      Luis Reese, FAVIO  24/00/74 5550

## 2019-06-08 NOTE — ED NOTES
Duoneb aerosol treatment ended. Pt tolerated well. Pulse ox 91% Pulse 105. Provider notified.      Matthew Ly LPN  26/95/21 2494

## 2019-06-08 NOTE — ED PROVIDER NOTES
HPI: Kodak Cotto II 53 y.o. Mago petersen past medical history of   Past Medical History:   Diagnosis Date    Asthma     [facility]     Cardiomyopathy ([facility]     Depression     Hyperlipemia     Pneumonia     TFCC (triangular fibrocartilage complex) tear 12/15/2014    presents with cough and wheezing. He said his asthma is flared up. He said last time he had this his Dr put him on albuterol and  on steroids. He said he is achy all over. He said he is not having chest pain but he does feel more short of breath and his lungs are very tight. He said his asthma is usually well controlled but when he gets sick like this in its sometimes not controlled and he's even had to be admitted for before he has had pneumonia in the past.      Denies chest pain, shortness or breath, post tussive emesis, or hemoptysis.       Review of Systems:   Pertinent positives and negatives are stated within HPI, all other systems reviewed and are negative.          --------------------------------------------- PAST HISTORY ---------------------------------------------  Past Medical History:  has a past medical history of Asthma, Bipolar 1 disorder ([facility] [address] Cardiomyopathy ([facility] Depression, Hyperlipemia, Pneumonia, and TFCC (triangular fibrocartilage complex) tear. Past Surgical History:  has a past surgical history that includes Cardiac catheterization; hernia repair; Finger surgery (Left, 1983); other surgical history (Right, feb 2014); Nerve Block (Left, 11/01/2018); pr inj dx/ther agnt paravert facet joint, cerv/thorac, 1st level (Left, 11/1/2018); Nerve Block (Left, 01/24/2019); and Anesthesia Nerve Block (Left, 1/24/2019). Social History:  reports that he quit smoking about 11 years ago. His smoking use included cigarettes. He uses smokeless tobacco. He reports that he does not drink alcohol or use drugs. Family History: family history is not on file.      The patients home medications have been reviewed. Allergies: Patient has no known allergies. ---------------------------------------------------PHYSICAL EXAM--------------------------------------    Constitutional/General: Alert and oriented x3, appears short of breath, has difficulty speaking complete sentences, non toxic in NAD  Head: Normocephalic and atraumatic  Eyes: conjunctiva clear  Mouth: Oropharynx clear, handling secretions, no trismus  Neck: Supple, full ROM, non tender to palpation in the midline, no stridor, no crepitus, no meningeal signs  Pulmonary: Lungs, dimnshed with wheezing throughout   Cardiovascular:  Regular rate. Regular rhythm. No murmurs, gallops, or rubs. 2+ distal pulses  Chest: no chest wall tenderness  Abdomen: Soft. Non tender. Non distended. Musculoskeletal: Moves all extremities x 4. Warm and well perfused, no clubbing, cyanosis, or edema. Capillary refill <3 seconds  Skin: warm and dry. No rashes. Neurologic: GCS 15  Psych: Normal Affect      -------------------------------------------------- RESULTS -------------------------------------------------    LABS:  No results found for this visit on 06/08/19. RADIOLOGY:  Interpreted by Radiologist.  XR CHEST STANDARD (2 VW)   Final Result   No acute cardiopulmonary disease, without acute infiltrate   or significant change since 2/8/2017 .                    ------------------------- NURSING NOTES AND VITALS REVIEWED ---------------------------   The nursing notes within the ED encounter and vital signs as below have been reviewed. /62   Pulse 96   Temp 98.1 °F (36.7 °C) (Oral)   Resp 20   Wt 225 lb (102.1 kg)   SpO2 93%   BMI 33.23 kg/m²   Oxygen Saturation Interpretation: Normal    The patients available past medical records and past encounters were reviewed.         ------------------------------ ED COURSE/MEDICAL DECISION MAKING----------------------  Medications   ipratropium-albuterol (DUONEB) nebulizer solution 1 ampule (1 ampule Inhalation Given 6/8/19 0901)   methylPREDNISolone sodium (SOLU-MEDROL) injection 125 mg (125 mg Intramuscular Given 6/8/19 0901)             Medical Decision Making:    Exam and history c/w bronchitis.  No evidence of localizing infection or PNA.  Did do a chest x-ray and it was negative. He is wheezing throughout and diminished throughout he was given a DuoNeb and Solu-Medrol. Following the breathing treatment I did review this into his lungs and he sounds much clearer he is improved airflow and the wheezing is gone. Chest x-ray was negative I will put him on prednisone and also doxycycline and Tessalon advised if he has any worsening symptoms or he feels like he is not getting better he needs to go to the ED for evaluation. Counseling: The emergency provider has spoken with the patient and discussed todays results, in addition to providing specific details for the plan of care and counseling regarding the diagnosis and prognosis. Questions are answered at this time and they are agreeable with the plan.       --------------------------------- IMPRESSION AND DISPOSITION ---------------------------------    IMPRESSION  1. Asthma, unspecified asthma severity, unspecified whether complicated, unspecified whether persistent    2.  Bronchitis        DISPOSITION  Disposition: Discharge to home  Patient condition is good           MELANIE Black - JANES  06/08/19 7936

## 2019-06-10 ENCOUNTER — OFFICE VISIT (OUTPATIENT)
Dept: FAMILY MEDICINE CLINIC | Age: 54
End: 2019-06-10
Payer: COMMERCIAL

## 2019-06-10 VITALS
SYSTOLIC BLOOD PRESSURE: 102 MMHG | WEIGHT: 232 LBS | DIASTOLIC BLOOD PRESSURE: 60 MMHG | HEIGHT: 69 IN | BODY MASS INDEX: 34.36 KG/M2 | TEMPERATURE: 97.8 F | HEART RATE: 82 BPM | OXYGEN SATURATION: 94 %

## 2019-06-10 DIAGNOSIS — J45.41 MODERATE PERSISTENT ASTHMA WITH EXACERBATION: Primary | ICD-10-CM

## 2019-06-10 DIAGNOSIS — E78.49 OTHER HYPERLIPIDEMIA: ICD-10-CM

## 2019-06-10 DIAGNOSIS — J45.30 MILD PERSISTENT ASTHMA, UNSPECIFIED WHETHER COMPLICATED: ICD-10-CM

## 2019-06-10 PROCEDURE — 99213 OFFICE O/P EST LOW 20 MIN: CPT | Performed by: INTERNAL MEDICINE

## 2019-06-10 PROCEDURE — 96372 THER/PROPH/DIAG INJ SC/IM: CPT | Performed by: INTERNAL MEDICINE

## 2019-06-10 RX ORDER — DEXTROMETHORPHAN HYDROBROMIDE AND PROMETHAZINE HYDROCHLORIDE 15; 6.25 MG/5ML; MG/5ML
5 SYRUP ORAL 4 TIMES DAILY PRN
Qty: 180 ML | Refills: 0 | Status: SHIPPED | OUTPATIENT
Start: 2019-06-10 | End: 2019-06-17

## 2019-06-10 RX ORDER — METHYLPREDNISOLONE ACETATE 80 MG/ML
80 INJECTION, SUSPENSION INTRA-ARTICULAR; INTRALESIONAL; INTRAMUSCULAR; SOFT TISSUE ONCE
Status: COMPLETED | OUTPATIENT
Start: 2019-06-10 | End: 2019-06-10

## 2019-06-10 RX ORDER — FLUTICASONE FUROATE AND VILANTEROL 200; 25 UG/1; UG/1
1 POWDER RESPIRATORY (INHALATION) EVERY MORNING
Qty: 3 EACH | Refills: 1 | Status: SHIPPED
Start: 2019-06-10 | End: 2020-06-29 | Stop reason: SDUPTHER

## 2019-06-10 RX ORDER — IPRATROPIUM BROMIDE AND ALBUTEROL SULFATE 2.5; .5 MG/3ML; MG/3ML
1 SOLUTION RESPIRATORY (INHALATION) EVERY 6 HOURS
Qty: 120 VIAL | Refills: 2 | Status: SHIPPED | OUTPATIENT
Start: 2019-06-10 | End: 2021-01-13 | Stop reason: SDUPTHER

## 2019-06-10 RX ADMIN — METHYLPREDNISOLONE ACETATE 80 MG: 80 INJECTION, SUSPENSION INTRA-ARTICULAR; INTRALESIONAL; INTRAMUSCULAR; SOFT TISSUE at 11:25

## 2019-06-10 ASSESSMENT — ENCOUNTER SYMPTOMS
WHEEZING: 1
BLOOD IN STOOL: 0
COUGH: 1
SHORTNESS OF BREATH: 1
NAUSEA: 0
EYE DISCHARGE: 0
ABDOMINAL PAIN: 0

## 2019-06-10 NOTE — PROGRESS NOTES
Skin is warm and dry. Psychiatric: His behavior is normal.   Vitals reviewed. Labs :    Lab Results   Component Value Date    WBC 8.3 02/04/2019    HGB 15.9 02/04/2019    HCT 46.3 02/04/2019     02/04/2019    CHOL 236 (H) 01/02/2017    TRIG 202 (H) 01/02/2017    HDL 61 02/04/2019     (H) 02/04/2019    AST 55 (H) 02/04/2019     02/04/2019    K 4.6 02/04/2019    CL 97 (L) 02/04/2019    CREATININE 1.1 02/04/2019    BUN 16 02/04/2019    CO2 28 02/04/2019    TSH 3.200 02/04/2019    PSA 0.48 02/08/2018    GLUF 90 02/04/2019     Lab Results   Component Value Date    COLORU Yellow 02/04/2019    NITRU Negative 02/04/2019    GLUCOSEU Negative 02/04/2019    KETUA TRACE 02/04/2019    UROBILINOGEN 0.2 02/04/2019    BILIRUBINUR Negative 02/04/2019     Lab Results   Component Value Date    PSA 0.48 02/08/2018             ASSESSMENT     Patient Active Problem List    Diagnosis Date Noted    Spondylarthrosis     Chronic pain syndrome 10/12/2018    Cervical disc disorder 10/12/2018    Cervical facet joint syndrome 10/12/2018    Spondylosis of cervical region without myelopathy or radiculopathy 10/12/2018    Neck pain 05/21/2018    Hyperlipemia 03/12/2018    Asthma 03/12/2018    Arthritis 03/12/2018    SELENA (obstructive sleep apnea) 03/12/2018    Wrist pain 12/15/2014    TFCC (triangular fibrocartilage complex) tear 12/15/2014    Elbow contusion 02/04/2014    Triceps tendon rupture 02/04/2014    Elbow pain 02/04/2014    Cubital tunnel syndrome 02/04/2014        Diagnosis:     ICD-10-CM    1. Moderate persistent asthma with exacerbation J45.41    2. Mild persistent asthma, unspecified whether complicated I65.10    3. Other hyperlipidemia E78.49        PLAN:        Depo medrol 80 mg IM    Cont treatment  Promethazine DM as needed    Pt is stable on current medical treatment. Continue current treatment plan    Side effects/Adverse effects/Precautions are reviewed with patient.      Low salt, Low Carb diet an low fat diet  Continue medications as advised and take them regularly  Regular exercises as advised    Discussed natural and expected course of this diagnosis and need to alert me if symptoms do not follow expected course, or if any worse. There are no Patient Instructions on file for this visit. Return in about 2 weeks (around 6/24/2019).

## 2019-06-12 ENCOUNTER — TELEPHONE (OUTPATIENT)
Dept: FAMILY MEDICINE CLINIC | Age: 54
End: 2019-06-12

## 2019-06-12 RX ORDER — METHYLPREDNISOLONE 4 MG/1
TABLET ORAL
Qty: 1 KIT | Refills: 0 | Status: SHIPPED | OUTPATIENT
Start: 2019-06-12 | End: 2019-06-24 | Stop reason: ALTCHOICE

## 2019-06-24 ENCOUNTER — OFFICE VISIT (OUTPATIENT)
Dept: FAMILY MEDICINE CLINIC | Age: 54
End: 2019-06-24
Payer: COMMERCIAL

## 2019-06-24 VITALS
SYSTOLIC BLOOD PRESSURE: 116 MMHG | BODY MASS INDEX: 33.62 KG/M2 | WEIGHT: 227 LBS | OXYGEN SATURATION: 99 % | DIASTOLIC BLOOD PRESSURE: 64 MMHG | HEART RATE: 97 BPM | HEIGHT: 69 IN

## 2019-06-24 DIAGNOSIS — J45.41 MODERATE PERSISTENT ASTHMA WITH EXACERBATION: Primary | ICD-10-CM

## 2019-06-24 DIAGNOSIS — E78.49 OTHER HYPERLIPIDEMIA: ICD-10-CM

## 2019-06-24 PROCEDURE — 99213 OFFICE O/P EST LOW 20 MIN: CPT | Performed by: INTERNAL MEDICINE

## 2019-06-24 ASSESSMENT — ENCOUNTER SYMPTOMS
ABDOMINAL PAIN: 0
SHORTNESS OF BREATH: 0
NAUSEA: 0
EYE DISCHARGE: 0
BLOOD IN STOOL: 0

## 2019-06-24 NOTE — PROGRESS NOTES
Chief Complaint   Patient presents with    Asthma     2 wk f/u, Feeling better. Finished medication gave at last visit.  Health Maintenance     Colonoscopy was completed, sending fax request for it. HPI:  Patient is here for follow-up  Feel much better       Allergy and Medications are reviewed and updated. Past Medical History, Surgical History, and Family History has been reviewed and updated. Review of Systems:  Review of Systems   Constitutional: Negative for chills and fever. HENT: Negative for congestion and ear pain. Eyes: Negative for discharge. Respiratory: Negative for shortness of breath (No new SOB). Cardiovascular: Negative for chest pain and leg swelling. Gastrointestinal: Negative for abdominal pain, blood in stool and nausea. Endocrine: Negative for polydipsia. Genitourinary: Negative for flank pain and hematuria. Musculoskeletal: Negative for myalgias and neck pain. Skin: Negative for rash. Neurological: Negative for dizziness and seizures. Hematological: Does not bruise/bleed easily. Psychiatric/Behavioral: Negative for hallucinations and suicidal ideas. Vitals:    06/24/19 1427   BP: 116/64   Pulse: 97   SpO2: 99%   Weight: 227 lb (103 kg)   Height: 5' 9\" (1.753 m)       Physical Exam   Constitutional: He is oriented to person, place, and time. He appears well-developed and well-nourished. HENT:   Head: Normocephalic and atraumatic. Mouth/Throat: Oropharynx is clear and moist.   Eyes: Pupils are equal, round, and reactive to light. Conjunctivae are normal.   Neck: No JVD present. Cardiovascular: Normal rate and regular rhythm. Pulmonary/Chest: Effort normal and breath sounds normal. He has no rales. Abdominal: Soft. Bowel sounds are normal.   Musculoskeletal: Normal range of motion. Lymphadenopathy:     He has no cervical adenopathy. Neurological: He is alert and oriented to person, place, and time. Skin: Skin is warm and dry. Psychiatric: His behavior is normal.   Vitals reviewed. Labs :    Lab Results   Component Value Date    WBC 8.3 02/04/2019    HGB 15.9 02/04/2019    HCT 46.3 02/04/2019     02/04/2019    CHOL 236 (H) 01/02/2017    TRIG 202 (H) 01/02/2017    HDL 61 02/04/2019     (H) 02/04/2019    AST 55 (H) 02/04/2019     02/04/2019    K 4.6 02/04/2019    CL 97 (L) 02/04/2019    CREATININE 1.1 02/04/2019    BUN 16 02/04/2019    CO2 28 02/04/2019    TSH 3.200 02/04/2019    PSA 0.48 02/08/2018    GLUF 90 02/04/2019     Lab Results   Component Value Date    COLORU Yellow 02/04/2019    NITRU Negative 02/04/2019    GLUCOSEU Negative 02/04/2019    KETUA TRACE 02/04/2019    UROBILINOGEN 0.2 02/04/2019    BILIRUBINUR Negative 02/04/2019     Lab Results   Component Value Date    PSA 0.48 02/08/2018             ASSESSMENT     Patient Active Problem List    Diagnosis Date Noted    Spondylarthrosis     Chronic pain syndrome 10/12/2018    Cervical disc disorder 10/12/2018    Cervical facet joint syndrome 10/12/2018    Spondylosis of cervical region without myelopathy or radiculopathy 10/12/2018    Neck pain 05/21/2018    Hyperlipemia 03/12/2018    Asthma 03/12/2018    Arthritis 03/12/2018    SELENA (obstructive sleep apnea) 03/12/2018    Wrist pain 12/15/2014    TFCC (triangular fibrocartilage complex) tear 12/15/2014    Elbow contusion 02/04/2014    Triceps tendon rupture 02/04/2014    Elbow pain 02/04/2014    Cubital tunnel syndrome 02/04/2014        Diagnosis:     ICD-10-CM    1. Moderate persistent asthma with exacerbation J45.41    2. Other hyperlipidemia E78.49        PLAN:      Clinically improved from last visit    Pt is stable on current medical treatment. Continue current treatment plan    Side effects/Adverse effects/Precautions are reviewed with patient.      Low salt, Low Carb diet an low fat diet  Continue medications as advised and take them regularly  Regular exercises as advised    Discussed natural and expected course of this diagnosis and need to alert me if symptoms do not follow expected course, or if any worse. Smoking cessation if applicable, discussed with patient. There are no Patient Instructions on file for this visit. Return in about 2 months (around 8/24/2019).

## 2019-08-26 RX ORDER — QUETIAPINE FUMARATE 100 MG/1
50 TABLET, FILM COATED ORAL DAILY
Qty: 45 TABLET | Refills: 0 | Status: SHIPPED | OUTPATIENT
Start: 2019-08-26 | End: 2020-01-02

## 2019-08-26 RX ORDER — DIVALPROEX SODIUM 500 MG/1
TABLET, EXTENDED RELEASE ORAL
Qty: 270 TABLET | Refills: 0 | Status: SHIPPED
Start: 2019-08-26 | End: 2021-01-17 | Stop reason: ALTCHOICE

## 2019-09-18 ENCOUNTER — OFFICE VISIT (OUTPATIENT)
Dept: FAMILY MEDICINE CLINIC | Age: 54
End: 2019-09-18
Payer: COMMERCIAL

## 2019-09-18 VITALS
WEIGHT: 219 LBS | TEMPERATURE: 97.7 F | OXYGEN SATURATION: 96 % | SYSTOLIC BLOOD PRESSURE: 108 MMHG | BODY MASS INDEX: 32.44 KG/M2 | HEART RATE: 59 BPM | HEIGHT: 69 IN | DIASTOLIC BLOOD PRESSURE: 76 MMHG

## 2019-09-18 DIAGNOSIS — Z01.818 PRE-OP EVALUATION: Primary | ICD-10-CM

## 2019-09-18 DIAGNOSIS — E78.49 OTHER HYPERLIPIDEMIA: ICD-10-CM

## 2019-09-18 DIAGNOSIS — M19.90 ARTHRITIS: ICD-10-CM

## 2019-09-18 DIAGNOSIS — J45.30 MILD PERSISTENT ASTHMA, UNSPECIFIED WHETHER COMPLICATED: ICD-10-CM

## 2019-09-18 LAB
ALBUMIN SERPL-MCNC: 3.7 G/DL
ALP BLD-CCNC: 49 U/L
ALT SERPL-CCNC: 30 U/L
ANION GAP SERPL CALCULATED.3IONS-SCNC: NORMAL MMOL/L
AST SERPL-CCNC: 24 U/L
BASOPHILS ABSOLUTE: 0 /ΜL
BASOPHILS RELATIVE PERCENT: 0.9 %
BILIRUB SERPL-MCNC: 0.4 MG/DL (ref 0.1–1.4)
BUN BLDV-MCNC: 17 MG/DL
CALCIUM SERPL-MCNC: 8.5 MG/DL
CHLORIDE BLD-SCNC: 106 MMOL/L
CO2: 29 MMOL/L
CREAT SERPL-MCNC: 1.2 MG/DL
EOSINOPHILS ABSOLUTE: 0.1 /ΜL
EOSINOPHILS RELATIVE PERCENT: 2.3 %
GFR CALCULATED: NORMAL
GLUCOSE BLD-MCNC: 93 MG/DL
HCT VFR BLD CALC: 44.5 % (ref 41–53)
HEMOGLOBIN: 15.2 G/DL (ref 13.5–17.5)
LYMPHOCYTES ABSOLUTE: 1.2 /ΜL
LYMPHOCYTES RELATIVE PERCENT: 30.9 %
MCH RBC QN AUTO: 31.3 PG
MCHC RBC AUTO-ENTMCNC: 34.2 G/DL
MCV RBC AUTO: 91.5 FL
MONOCYTES ABSOLUTE: 0.4 /ΜL
MONOCYTES RELATIVE PERCENT: 10.8 %
NEUTROPHILS ABSOLUTE: 2.1 /ΜL
NEUTROPHILS RELATIVE PERCENT: 55.1 %
PDW BLD-RTO: 12.9 %
PLATELET # BLD: 194 K/ΜL
PMV BLD AUTO: 9 FL
POTASSIUM SERPL-SCNC: 4.1 MMOL/L
RBC # BLD: 4.86 10^6/ΜL
SODIUM BLD-SCNC: 142 MMOL/L
TOTAL PROTEIN: 6.5
WBC # BLD: 3.8 10^3/ML

## 2019-09-18 PROCEDURE — 99213 OFFICE O/P EST LOW 20 MIN: CPT | Performed by: INTERNAL MEDICINE

## 2019-09-18 RX ORDER — TADALAFIL 10 MG/1
TABLET ORAL
Refills: 1 | COMMUNITY
Start: 2019-08-07

## 2019-09-18 ASSESSMENT — ENCOUNTER SYMPTOMS
EYE DISCHARGE: 0
ABDOMINAL PAIN: 0
NAUSEA: 0
SHORTNESS OF BREATH: 0
BLOOD IN STOOL: 0

## 2019-11-18 ENCOUNTER — OFFICE VISIT (OUTPATIENT)
Dept: FAMILY MEDICINE CLINIC | Age: 54
End: 2019-11-18
Payer: COMMERCIAL

## 2019-11-18 VITALS
DIASTOLIC BLOOD PRESSURE: 70 MMHG | HEART RATE: 75 BPM | BODY MASS INDEX: 33.03 KG/M2 | WEIGHT: 223 LBS | SYSTOLIC BLOOD PRESSURE: 98 MMHG | HEIGHT: 69 IN | OXYGEN SATURATION: 96 %

## 2019-11-18 DIAGNOSIS — J45.41 MODERATE PERSISTENT ASTHMA WITH EXACERBATION: ICD-10-CM

## 2019-11-18 DIAGNOSIS — E78.49 OTHER HYPERLIPIDEMIA: Primary | ICD-10-CM

## 2019-11-18 DIAGNOSIS — Z96.642 HISTORY OF TOTAL LEFT HIP REPLACEMENT: ICD-10-CM

## 2019-11-18 PROCEDURE — 99213 OFFICE O/P EST LOW 20 MIN: CPT | Performed by: INTERNAL MEDICINE

## 2019-11-18 RX ORDER — ESOMEPRAZOLE MAGNESIUM 40 MG/1
40 CAPSULE, DELAYED RELEASE ORAL
Qty: 30 CAPSULE | Refills: 2 | Status: SHIPPED
Start: 2019-11-18 | End: 2020-05-08 | Stop reason: SDUPTHER

## 2019-11-18 ASSESSMENT — ENCOUNTER SYMPTOMS
ABDOMINAL PAIN: 0
BLOOD IN STOOL: 0
SHORTNESS OF BREATH: 0
EYE DISCHARGE: 0
NAUSEA: 0

## 2020-01-06 RX ORDER — QUETIAPINE FUMARATE 100 MG/1
TABLET, FILM COATED ORAL
Qty: 45 TABLET | Refills: 0 | Status: SHIPPED
Start: 2020-01-06 | End: 2021-01-17 | Stop reason: DRUGHIGH

## 2020-01-18 ENCOUNTER — HOSPITAL ENCOUNTER (OUTPATIENT)
Age: 55
Discharge: HOME | End: 2020-01-18
Payer: COMMERCIAL

## 2020-01-18 LAB
ALBUMIN SERPL-MCNC: 4.5 G/DL (ref 3.5–5.2)
ALP BLD-CCNC: 55 U/L (ref 40–129)
ALT SERPL-CCNC: 20 U/L (ref 0–40)
ANION GAP SERPL CALCULATED.3IONS-SCNC: 14 MMOL/L (ref 7–16)
AST SERPL-CCNC: 24 U/L (ref 0–39)
BASOPHILS ABSOLUTE: 0.04 E9/L (ref 0–0.2)
BASOPHILS RELATIVE PERCENT: 0.9 % (ref 0–2)
BILIRUB SERPL-MCNC: 0.5 MG/DL (ref 0–1.2)
BUN BLDV-MCNC: 17 MG/DL (ref 6–20)
CALCIUM SERPL-MCNC: 9.2 MG/DL (ref 8.6–10.2)
CHLORIDE BLD-SCNC: 101 MMOL/L (ref 98–107)
CHOLESTEROL, FASTING: 244 MG/DL (ref 0–199)
CO2: 27 MMOL/L (ref 22–29)
CREAT SERPL-MCNC: 1.1 MG/DL (ref 0.7–1.2)
EOSINOPHILS ABSOLUTE: 0.14 E9/L (ref 0.05–0.5)
EOSINOPHILS RELATIVE PERCENT: 3.2 % (ref 0–6)
GFR AFRICAN AMERICAN: >60
GFR NON-AFRICAN AMERICAN: >60 ML/MIN/1.73
GLUCOSE FASTING: 85 MG/DL (ref 74–99)
HCT VFR BLD CALC: 46.4 % (ref 37–54)
HDLC SERPL-MCNC: 58 MG/DL
HEMOGLOBIN: 15.8 G/DL (ref 12.5–16.5)
IMMATURE GRANULOCYTES #: 0.01 E9/L
IMMATURE GRANULOCYTES %: 0.2 % (ref 0–5)
LDL CHOLESTEROL CALCULATED: 160 MG/DL (ref 0–99)
LYMPHOCYTES ABSOLUTE: 1.36 E9/L (ref 1.5–4)
LYMPHOCYTES RELATIVE PERCENT: 31 % (ref 20–42)
MCH RBC QN AUTO: 30.4 PG (ref 26–35)
MCHC RBC AUTO-ENTMCNC: 34.1 % (ref 32–34.5)
MCV RBC AUTO: 89.2 FL (ref 80–99.9)
MONOCYTES ABSOLUTE: 0.47 E9/L (ref 0.1–0.95)
MONOCYTES RELATIVE PERCENT: 10.7 % (ref 2–12)
NEUTROPHILS ABSOLUTE: 2.37 E9/L (ref 1.8–7.3)
NEUTROPHILS RELATIVE PERCENT: 54 % (ref 43–80)
PDW BLD-RTO: 12.9 FL (ref 11.5–15)
PLATELET # BLD: 207 E9/L (ref 130–450)
PMV BLD AUTO: 10.3 FL (ref 7–12)
POTASSIUM SERPL-SCNC: 4.7 MMOL/L (ref 3.5–5)
RBC # BLD: 5.2 E12/L (ref 3.8–5.8)
SODIUM BLD-SCNC: 142 MMOL/L (ref 132–146)
TOTAL PROTEIN: 6.9 G/DL (ref 6.4–8.3)
TRIGLYCERIDE, FASTING: 131 MG/DL (ref 0–149)
TSH SERPL DL<=0.05 MIU/L-ACNC: 3.03 UIU/ML (ref 0.27–4.2)
VLDLC SERPL CALC-MCNC: 26 MG/DL
WBC # BLD: 4.4 E9/L (ref 4.5–11.5)

## 2020-01-18 PROCEDURE — 85025 COMPLETE CBC W/AUTO DIFF WBC: CPT

## 2020-01-18 PROCEDURE — 80061 LIPID PANEL: CPT

## 2020-01-18 PROCEDURE — 80053 COMPREHEN METABOLIC PANEL: CPT

## 2020-01-18 PROCEDURE — 36415 COLL VENOUS BLD VENIPUNCTURE: CPT

## 2020-01-18 PROCEDURE — 84443 ASSAY THYROID STIM HORMONE: CPT

## 2020-01-20 ENCOUNTER — OFFICE VISIT (OUTPATIENT)
Dept: FAMILY MEDICINE CLINIC | Age: 55
End: 2020-01-20
Payer: COMMERCIAL

## 2020-01-20 VITALS
WEIGHT: 222.9 LBS | SYSTOLIC BLOOD PRESSURE: 98 MMHG | OXYGEN SATURATION: 93 % | HEART RATE: 65 BPM | BODY MASS INDEX: 33.02 KG/M2 | HEIGHT: 69 IN | DIASTOLIC BLOOD PRESSURE: 60 MMHG

## 2020-01-20 PROCEDURE — 99213 OFFICE O/P EST LOW 20 MIN: CPT | Performed by: INTERNAL MEDICINE

## 2020-01-20 RX ORDER — AMOXICILLIN 500 MG/1
CAPSULE ORAL
COMMUNITY
Start: 2019-11-20 | End: 2020-01-20

## 2020-01-20 ASSESSMENT — PATIENT HEALTH QUESTIONNAIRE - PHQ9
1. LITTLE INTEREST OR PLEASURE IN DOING THINGS: 0
SUM OF ALL RESPONSES TO PHQ QUESTIONS 1-9: 0
2. FEELING DOWN, DEPRESSED OR HOPELESS: 0
SUM OF ALL RESPONSES TO PHQ9 QUESTIONS 1 & 2: 0
SUM OF ALL RESPONSES TO PHQ QUESTIONS 1-9: 0

## 2020-01-20 ASSESSMENT — ENCOUNTER SYMPTOMS
NAUSEA: 0
BLOOD IN STOOL: 0
ABDOMINAL PAIN: 0
EYE DISCHARGE: 0
SHORTNESS OF BREATH: 0

## 2020-01-20 NOTE — PROGRESS NOTES
Chief Complaint   Patient presents with   Trevon Esquivelargo 647 labs 1/18/2020     Other     Recent hip replacement 10/2/2019       HPI:  Patient is here for follow-up     Allergy and Medications are reviewed and updated. Past Medical History, Surgical History, and Family History has been reviewed and updated. Review of Systems:  Review of Systems   Constitutional: Negative for chills and fever. HENT: Negative for congestion and ear pain. Eyes: Negative for discharge. Respiratory: Negative for shortness of breath (No new SOB). Cardiovascular: Negative for chest pain and leg swelling. Gastrointestinal: Negative for abdominal pain, blood in stool and nausea. Endocrine: Negative for polydipsia. Genitourinary: Negative for flank pain and hematuria. Musculoskeletal: Negative for myalgias and neck pain. Skin: Negative for rash. Neurological: Negative for dizziness and seizures. Hematological: Does not bruise/bleed easily. Psychiatric/Behavioral: Negative for hallucinations and suicidal ideas. Vitals:    01/20/20 1553   BP: 98/60   Site: Left Upper Arm   Position: Sitting   Cuff Size: Medium Adult   Pulse: 65   SpO2: 93%   Weight: 222 lb 14.4 oz (101.1 kg)   Height: 5' 9\" (1.753 m)       Physical Exam  Vitals signs reviewed. Constitutional:       Appearance: He is well-developed. HENT:      Head: Normocephalic and atraumatic. Eyes:      Conjunctiva/sclera: Conjunctivae normal.      Pupils: Pupils are equal, round, and reactive to light. Neck:      Vascular: No JVD. Cardiovascular:      Rate and Rhythm: Normal rate and regular rhythm. Pulmonary:      Effort: Pulmonary effort is normal.      Breath sounds: Normal breath sounds. No rales. Abdominal:      General: Bowel sounds are normal.      Palpations: Abdomen is soft. Musculoskeletal: Normal range of motion. Lymphadenopathy:      Cervical: No cervical adenopathy. Skin:     General: Skin is warm and dry. Neurological:      Mental Status: He is alert and oriented to person, place, and time. Psychiatric:         Behavior: Behavior normal.          Labs :    Lab Results   Component Value Date    WBC 4.4 (L) 01/18/2020    HGB 15.8 01/18/2020    HCT 46.4 01/18/2020     01/18/2020    CHOL 236 (H) 01/02/2017    TRIG 202 (H) 01/02/2017    HDL 58 01/18/2020    ALT 20 01/18/2020    AST 24 01/18/2020     01/18/2020    K 4.7 01/18/2020     01/18/2020    CREATININE 1.1 01/18/2020    BUN 17 01/18/2020    CO2 27 01/18/2020    TSH 3.030 01/18/2020    PSA 0.48 02/08/2018    GLUF 85 01/18/2020     Lab Results   Component Value Date    COLORU Yellow 02/04/2019    NITRU Negative 02/04/2019    GLUCOSEU Negative 02/04/2019    KETUA TRACE 02/04/2019    UROBILINOGEN 0.2 02/04/2019    BILIRUBINUR Negative 02/04/2019     Lab Results   Component Value Date    PSA 0.48 02/08/2018             ASSESSMENT     Patient Active Problem List    Diagnosis Date Noted    Moderate persistent asthma with exacerbation 11/18/2019    History of total left hip replacement - 10/2/19 11/18/2019    Spondylarthrosis     Chronic pain syndrome 10/12/2018    Cervical disc disorder 10/12/2018    Cervical facet joint syndrome 10/12/2018    Spondylosis of cervical region without myelopathy or radiculopathy 10/12/2018    Neck pain 05/21/2018    Hyperlipemia 03/12/2018    Asthma 03/12/2018    Arthritis 03/12/2018    SELENA (obstructive sleep apnea) 03/12/2018    Wrist pain 12/15/2014    TFCC (triangular fibrocartilage complex) tear 12/15/2014    Elbow contusion 02/04/2014    Triceps tendon rupture 02/04/2014    Elbow pain 02/04/2014    Cubital tunnel syndrome 02/04/2014        Diagnosis:     ICD-10-CM    1. Other hyperlipidemia E78.49 Comprehensive Metabolic Panel, Fasting     Lipid, Fasting   2. Moderate persistent asthma with exacerbation J45.41    3.  History of total left hip replacement - 10/2/19 Z96.642        PLAN:       Pt is

## 2020-03-19 ENCOUNTER — OFFICE VISIT (OUTPATIENT)
Dept: FAMILY MEDICINE CLINIC | Age: 55
End: 2020-03-19
Payer: COMMERCIAL

## 2020-03-19 VITALS
BODY MASS INDEX: 32.34 KG/M2 | WEIGHT: 219 LBS | DIASTOLIC BLOOD PRESSURE: 70 MMHG | HEART RATE: 76 BPM | TEMPERATURE: 98 F | SYSTOLIC BLOOD PRESSURE: 98 MMHG

## 2020-03-19 PROCEDURE — 99213 OFFICE O/P EST LOW 20 MIN: CPT | Performed by: INTERNAL MEDICINE

## 2020-03-19 RX ORDER — ONDANSETRON 8 MG/1
4 TABLET, ORALLY DISINTEGRATING ORAL EVERY 8 HOURS PRN
Qty: 12 TABLET | Refills: 0 | Status: SHIPPED
Start: 2020-03-19 | End: 2021-02-25 | Stop reason: ALTCHOICE

## 2020-03-19 ASSESSMENT — ENCOUNTER SYMPTOMS
SHORTNESS OF BREATH: 0
VOMITING: 1
NAUSEA: 1
EYE DISCHARGE: 0
BLOOD IN STOOL: 0
ABDOMINAL PAIN: 0

## 2020-03-19 NOTE — LETTER
[facility] [address] [place] [address]  Phone: [phone]  Fax: Clint Mata MD        March 19, 2020     Patient: Elizabeth Mendoza   YOB: 1965   Date of Visit: 3/19/2020       To Whom it May Concern:    Refugio Mclain was seen in my clinic on 3/19/2020. He may return to work on 3/30/2020. .    If you have any questions or concerns, please don't hesitate to call.     Sincerely,         Silvia Fernandez MD

## 2020-05-08 ENCOUNTER — TELEMEDICINE (OUTPATIENT)
Dept: FAMILY MEDICINE CLINIC | Age: 55
End: 2020-05-08
Payer: COMMERCIAL

## 2020-05-08 VITALS — TEMPERATURE: 98.2 F | BODY MASS INDEX: 32.78 KG/M2 | WEIGHT: 222 LBS

## 2020-05-08 PROCEDURE — 99213 OFFICE O/P EST LOW 20 MIN: CPT | Performed by: INTERNAL MEDICINE

## 2020-05-08 RX ORDER — ESOMEPRAZOLE MAGNESIUM 40 MG/1
40 CAPSULE, DELAYED RELEASE ORAL
Qty: 90 CAPSULE | Refills: 1 | Status: SHIPPED
Start: 2020-05-08 | End: 2021-02-25 | Stop reason: SDUPTHER

## 2020-05-08 ASSESSMENT — ENCOUNTER SYMPTOMS
EYE DISCHARGE: 0
ABDOMINAL PAIN: 0
SHORTNESS OF BREATH: 0
BLOOD IN STOOL: 0
NAUSEA: 0

## 2020-05-08 NOTE — PROGRESS NOTES
TeleMedicine Patient Consent    This visit was performed as a virtual video visit using a synchronous, two-way, audio-video telehealth technology platform. Patient identification was verified at the start of the visit, including the patient's telephone number and physical location. I discussed with the patient the nature of our telehealth visits, that:     1. Due to the nature of an audio- video modality, the only components of a physical exam that could be done are the elements supported by direct observation. 2. I would evaluate the patient and recommend diagnostics and treatments based on my assessment. 3. If it was felt that the patient should be evaluated in clinic or an emergency room setting, then they would be directed there. 4. Our sessions are not being recorded and that personal health information is protected. 5. Our team would provide follow up care in person if/when the patient needs it. Patient does agree to proceed with telemedicine consultation. Patient's location: home address in [place] [address] other people involved in call-     Chief Complaint   Patient presents with    Gastroesophageal Reflux     Here for recheck on Gerd. Patient reports feeling well. Requesting refill on nexium       HPI:  Patient is a video visit  No complaints      Allergy and Medications are reviewed and updated. Past Medical History, Surgical History, and Family History has been reviewed and updated. Review of Systems:  Review of Systems   Constitutional: Negative for chills and fever. HENT: Negative for congestion and ear pain. Eyes: Negative for discharge. Respiratory: Negative for shortness of breath (No new SOB). Cardiovascular: Negative for chest pain and leg swelling. Gastrointestinal: Negative for abdominal pain, blood in stool and nausea. Endocrine: Negative for polydipsia. Genitourinary: Negative for flank pain and hematuria.    Musculoskeletal: Negative for

## 2021-01-11 ENCOUNTER — APPOINTMENT (OUTPATIENT)
Dept: GENERAL RADIOLOGY | Age: 56
End: 2021-01-11
Payer: COMMERCIAL

## 2021-01-11 ENCOUNTER — HOSPITAL ENCOUNTER (EMERGENCY)
Age: 56
Discharge: HOME | End: 2021-01-11
Payer: COMMERCIAL

## 2021-01-11 VITALS
WEIGHT: 223 LBS | OXYGEN SATURATION: 96 % | HEIGHT: 69 IN | RESPIRATION RATE: 20 BRPM | BODY MASS INDEX: 33.03 KG/M2 | HEART RATE: 110 BPM | DIASTOLIC BLOOD PRESSURE: 68 MMHG | SYSTOLIC BLOOD PRESSURE: 103 MMHG | TEMPERATURE: 97.7 F

## 2021-01-11 DIAGNOSIS — U07.1 COVID-19: Primary | ICD-10-CM

## 2021-01-11 PROCEDURE — 71046 X-RAY EXAM CHEST 2 VIEWS: CPT

## 2021-01-11 PROCEDURE — 99211 OFF/OP EST MAY X REQ PHY/QHP: CPT

## 2021-01-11 RX ORDER — DEXAMETHASONE 6 MG/1
6 TABLET ORAL DAILY
Qty: 7 TABLET | Refills: 0 | Status: ON HOLD | OUTPATIENT
Start: 2021-01-11 | End: 2021-01-23 | Stop reason: HOSPADM

## 2021-01-11 RX ORDER — METHYLPREDNISOLONE 4 MG/1
2 TABLET ORAL DAILY
COMMUNITY
End: 2021-01-11 | Stop reason: ALTCHOICE

## 2021-01-11 RX ORDER — METHYLPREDNISOLONE 4 MG/1
TABLET ORAL
Qty: 1 KIT | Refills: 0 | Status: SHIPPED
Start: 2021-01-11 | End: 2021-01-11 | Stop reason: ALTCHOICE

## 2021-01-11 RX ORDER — AZITHROMYCIN 250 MG/1
TABLET, FILM COATED ORAL
Qty: 6 TABLET | Refills: 0 | Status: SHIPPED | OUTPATIENT
Start: 2021-01-11 | End: 2021-01-17 | Stop reason: ALTCHOICE

## 2021-01-11 RX ORDER — DEXTROMETHORPHAN HYDROBROMIDE AND PROMETHAZINE HYDROCHLORIDE 15; 6.25 MG/5ML; MG/5ML
5 SOLUTION ORAL 4 TIMES DAILY
Qty: 180 ML | Refills: 0 | Status: SHIPPED | OUTPATIENT
Start: 2021-01-11 | End: 2021-01-13 | Stop reason: SDUPTHER

## 2021-01-13 ENCOUNTER — OFFICE VISIT (OUTPATIENT)
Dept: PRIMARY CARE CLINIC | Age: 56
End: 2021-01-13
Payer: COMMERCIAL

## 2021-01-13 ENCOUNTER — TELEPHONE (OUTPATIENT)
Dept: FAMILY MEDICINE CLINIC | Age: 56
End: 2021-01-13

## 2021-01-13 VITALS
TEMPERATURE: 100 F | HEIGHT: 69 IN | DIASTOLIC BLOOD PRESSURE: 84 MMHG | SYSTOLIC BLOOD PRESSURE: 118 MMHG | BODY MASS INDEX: 33.92 KG/M2 | WEIGHT: 229 LBS | HEART RATE: 109 BPM | OXYGEN SATURATION: 94 %

## 2021-01-13 DIAGNOSIS — U07.1 COVID-19: Primary | ICD-10-CM

## 2021-01-13 DIAGNOSIS — J45.41 MODERATE PERSISTENT ASTHMA WITH EXACERBATION: ICD-10-CM

## 2021-01-13 PROCEDURE — 99213 OFFICE O/P EST LOW 20 MIN: CPT | Performed by: NURSE PRACTITIONER

## 2021-01-13 RX ORDER — ALBUTEROL SULFATE 90 UG/1
2 AEROSOL, METERED RESPIRATORY (INHALATION) PRN
Qty: 1 INHALER | Refills: 0 | Status: SHIPPED
Start: 2021-01-13 | End: 2022-04-27 | Stop reason: SDUPTHER

## 2021-01-13 RX ORDER — IPRATROPIUM BROMIDE AND ALBUTEROL SULFATE 2.5; .5 MG/3ML; MG/3ML
1 SOLUTION RESPIRATORY (INHALATION) EVERY 6 HOURS
Qty: 120 VIAL | Refills: 0 | Status: SHIPPED
Start: 2021-01-13 | End: 2022-09-02 | Stop reason: SDUPTHER

## 2021-01-13 RX ORDER — DEXTROMETHORPHAN HYDROBROMIDE AND PROMETHAZINE HYDROCHLORIDE 15; 6.25 MG/5ML; MG/5ML
5 SOLUTION ORAL 4 TIMES DAILY
Qty: 180 ML | Refills: 0 | Status: ON HOLD | OUTPATIENT
Start: 2021-01-13 | End: 2021-01-23 | Stop reason: HOSPADM

## 2021-01-13 RX ORDER — BENZONATATE 100 MG/1
100 CAPSULE ORAL 3 TIMES DAILY PRN
Qty: 21 CAPSULE | Refills: 0 | Status: ON HOLD
Start: 2021-01-13 | End: 2021-01-23

## 2021-01-13 NOTE — PATIENT INSTRUCTIONS

## 2021-01-13 NOTE — ED PROVIDER NOTES
Department of Emergency Medicine   [address] [place] [address]  Provider Note  Admit Date/RoomTime: 2021  6:02 PM  Room:   NAME: Stephani Villalobos II  : 1965  MRN: [id number]     Chief Complaint:  Other (pt has  asthma  having  attack  states needs  steroid   has covid  dr abdon ye)    History of Present Illness       Stephani Villalobos II is a 61 y.o. old male who presents to the emergency department for what he is calling \"an asthma attack. He said he always has asthma attacks and but his inhaler is not doing much. He did test positive for Covid today. He is complaining of wheezing and shortness of breath. ROS    Pertinent positives and negatives are stated within HPI, all other systems reviewed and are negative. Past Surgical History:   Procedure Laterality Date    ANESTHESIA NERVE BLOCK Left 2019    LEFT C3,4,5 MEDIAL BRANCH BLOCK performed by Charmaine Ye MD at [address] [place] [address] Left     pinkie    HERNIA REPAIR      inguinal and umbical    NERVE BLOCK Left 2018    medial branch block    NERVE BLOCK Left 2019    cervical mbb    OTHER SURGICAL HISTORY Right 2014    repair tricepts tondon    [place] INJ DX/THER AGNT PARAVERT FACET JOINT, CERV/THORAC, 1ST LEVEL Left 2018    LEFT C3 C4 C5 MEDIAL BRANCH BLOCK performed by Charmaine Ye MD at [place] [address] Left 10/02/2019    Dr Yuridia Torre [address]    Social History:  reports that he quit smoking about 12 years ago. His smoking use included cigarettes. He has a 5.00 pack-year smoking history. His smokeless tobacco use includes snuff. He reports that he does not drink alcohol or use drugs. Family History: family history is not on file. Allergies: Patient has no known allergies.     Physical Exam            ED Triage Vitals [21 1804]   BP Temp Temp Source Pulse Resp SpO2 Height Weight   103/68 97.7 °F (36.5 °C) Infrared 110 20 96 % 5' 9\" (1.753 m) 223 lb (101.2 kg)      Oxygen Saturation Interpretation: Normal.    Constitutional:  Alert, development consistent with age. Ears:  External Ears: Bilateral normal.               TM's & External Canals: normal appearance, normal TMs bilaterally. Nose:   There is no discharge, swelling or lesions noted. Sinuses: no Bilateral maxillary sinus tenderness. no Bilateral frontal sinus tenderness. Neck/Lymphatics:  Neck Supple. Respiratory:   Breath sounds: Diminished with wheezing throughout   CV: Heart rate 110/slight tachycardia  GI:  Abdomen Soft, nontender, good bowel sounds. No firm or pulsatile mass. Integument:  Normal turgor. Warm, dry, without visible rash. Neurological:  Oriented. Motor functions intact. Lab / Imaging Results   (All laboratory and radiology results have been personally reviewed by myself)  Labs:  No results found for this visit on 01/11/21. Imaging: All Radiology results interpreted by Radiologist unless otherwise noted. XR CHEST (2 VW)   Final Result   No acute process. ED Course / Medical Decision Making   Medications - No data to display       MDM:     Patient is positive for Covid I did tell him that these are Covid symptoms-- this  is not an asthma attack. His lungs have some wheezing throughout he does have an inhaler that he can use. His sat is 96%. Is a little bit tachycardic at 110 I did do a chest x-ray and it was negative. He does not have chest pain. I did put him on a Z-Navjot and also some dexamethasone advised him to take zinc and vitamin C. I did advise him to quarantine. I advised him if he has any worsening symptoms such as chest pain shortness of breath or worsening symptoms he needs to go directly to the emergency department. Plan of Care: Normal progression of disease discussed. All questions answered.   Explained the rationale for symptomatic treatment  Instruction provided in the use of fluids, vaporizer, acetaminophen, and other OTC medication for symptom control. Extra fluids  Analgesics as needed, dose reviewed. Follow up as needed should symptoms fail to improve. Assessment      1. COVID-19      Plan   Discharge to home and advised to contact Noreen Brar, [address] [place] [address]  [phone]    Schedule an appointment as soon as possible for a visit      Patient condition is good    New Medications     Discharge Medication List as of 1/11/2021  6:59 PM      START taking these medications    Details   azithromycin (ZITHROMAX Z-AZUCENA) 250 MG tablet Take 2 tabs on day one, followed by 1 tablet daily for 4 days. , Disp-6 tablet, R-0Normal      dexamethasone (DECADRON) 6 MG tablet Take 1 tablet by mouth daily for 7 days, Disp-7 tablet, R-0Normal           Electronically signed by MELANIE Dias CNP   DD: 1/13/21  **This report was transcribed using voice recognition software. Every effort was made to ensure accuracy; however, inadvertent computerized transcription errors may be present.   END OF ED PROVIDER NOTE     MELANIE Dias CNP  01/13/21 [id number]

## 2021-01-13 NOTE — TELEPHONE ENCOUNTER
Patient's wife called on behalf of patient who was at the [facility] today, diagnosed with COVID-19, 1/19/21. Wife stated she will call back to RS. Wife stated she will be faxing over forms for Dr. Chino Maxwell to complete for patient to get Short Term Disability.      Last seen 7/23/2020  Next appt Visit date not found

## 2021-01-13 NOTE — PROGRESS NOTES
Chief Complaint   Positive For Covid-19 (1/9/21 z pac and decadron 3rd day ), Fever, and Shortness of Breath (treatments q 5 hr )    History of Present Illness   Source of history provided by:  patient. Peggy Alonso is a 61 y.o. old male who presents to the flu clinic with complaints of Fever x 3 days. States symptoms have stayed the same since onset, with increase of coughing while trying to talk. Has been taking OTC cough suppressant, Acetaminophen, inhaled steroid/ngnosed with COVID on 1/9 & visited ER on 1/11 which he was provided medication regimen which he has taken 2 days worth. Chest x-ray was negative. He has history of asthma & concerned about exacerbation. His duoneb & inhaler is over a year old he reports. He has video visit with his PCP today & is mychart active with him. ROS   Pertinent positives and negatives are stated within HPI, all other systems reviewed and are negative. Past Medical History:  has a past medical history of Asthma, Bipolar 1 disorder ([address] Cardiomyopathy ([address] Depression, Hyperlipemia, Pneumonia, and TFCC (triangular fibrocartilage complex) tear. Past Surgical History:  has a past surgical history that includes Cardiac catheterization; hernia repair; Finger surgery (Left, 1983); other surgical history (Right, feb 2014); Nerve Block (Left, 11/01/2018); pr inj dx/ther agnt paravert facet joint, cerv/thorac, 1st level (Left, 11/1/2018); Nerve Block (Left, 01/24/2019); Anesthesia Nerve Block (Left, 1/24/2019); Total hip arthroplasty (Left, 10/02/2019); and Rotator cuff repair. Social History:  reports that he quit smoking about 12 years ago. His smoking use included cigarettes. He has a 5.00 pack-year smoking history. His smokeless tobacco use includes snuff. He reports that he does not drink alcohol or use drugs. Family History: family history is not on file. Allergies: Patient has no known allergies.     Physical Exam   Vital Signs:  /84 (Site: Left Upper Arm, Position: Sitting, Cuff Size: Large Adult)   Pulse 109   Temp 100 °F (37.8 °C)   Ht 5' 9\" (1.753 m)   Wt 229 lb (103.9 kg)   SpO2 94%   BMI 33.82 kg/m²    Oxygen Saturation Interpretation: Abnormal, at 95% consistently. Physical Exam  Vitals signs reviewed. HENT:      Head: Normocephalic and atraumatic. Right Ear: Tympanic membrane, ear canal and external ear normal.      Left Ear: Tympanic membrane, ear canal and external ear normal.      Nose: Rhinorrhea present. No congestion. Mouth/Throat:      Mouth: Mucous membranes are moist.      Pharynx: Oropharynx is clear. No oropharyngeal exudate or posterior oropharyngeal erythema. Eyes:      Extraocular Movements: Extraocular movements intact. Conjunctiva/sclera: Conjunctivae normal.      Pupils: Pupils are equal, round, and reactive to light. Neck:      Musculoskeletal: Normal range of motion and neck supple. Cardiovascular:      Rate and Rhythm: Normal rate and regular rhythm. Pulses: Normal pulses. Heart sounds: Normal heart sounds. Pulmonary:      Effort: Pulmonary effort is normal. No respiratory distress. Breath sounds: Normal breath sounds. No wheezing. Abdominal:      General: Abdomen is flat. Tenderness: There is abdominal tenderness. Musculoskeletal: Normal range of motion. Lymphadenopathy:      Cervical: No cervical adenopathy. Skin:     General: Skin is warm and dry. Coloration: Skin is not pale. Findings: No erythema or rash. Neurological:      General: No focal deficit present. Mental Status: He is oriented to person, place, and time. Mental status is at baseline. Motor: No weakness. Coordination: Coordination normal.      Gait: Gait normal.   Psychiatric:         Mood and Affect: Mood normal.         Behavior: Behavior normal.         Thought Content:  Thought content normal.         Lab / Imaging Results   (All laboratory and radiology results have been personally reviewed by myself)  Labs:  No results found for this visit on 01/13/21. Medical Decision Making   Pt non-toxic, in no apparent distress and stable at time of discharge. Assessment/Plan   Eva Call was seen today for positive for covid-19, fever and shortness of breath. Diagnoses and all orders for this visit:    COVID-19  -     Promethazine-DM (PROMETHAZINE-DEXTROMETHORPHAN) 6.25-15 MG/5ML SOLN solution; Take 5 mLs by mouth 4 times daily  -     ipratropium-albuterol (DUONEB) 0.5-2.5 (3) MG/3ML SOLN nebulizer solution; Take 3 mLs by nebulization every 6 hours  -     albuterol sulfate  (90 Base) MCG/ACT inhaler; Inhale 2 puffs into the lungs as needed for Wheezing  -     benzonatate (TESSALON PERLES) 100 MG capsule; Take 1 capsule by mouth 3 times daily as needed for Cough    Moderate persistent asthma with exacerbation  -     ipratropium-albuterol (DUONEB) 0.5-2.5 (3) MG/3ML SOLN nebulizer solution; Take 3 mLs by nebulization every 6 hours  -     albuterol sulfate  (90 Base) MCG/ACT inhaler; Inhale 2 puffs into the lungs as needed for Wheezing      Increase fluids and rest. Symptomatic relief discussed including Tylenol prn pain/fever. Schedule f/u with PCP in 7-10 days if symptoms persist, or return to [facility] ED sooner if symptoms worsen or change. ED immediately with high or refractory fever, progressive SOB, dyspnea, CP, calf pain/swelling, shaking chills, vomiting, abdominal pain, lethargy, flank pain, or decreased urinary output. Pt verbalizes understanding and is in agreement with plan of care. All questions answered. Bijal Ledezma, APRN - CNP    This visit was provided as a focused evaluation during the COVID -19 pandemic/national emergency. A comprehensive review of all previous patient history and testing was not conducted. Pertinent findings were elicited during the visit. *NOTE: This report was transcribed using voice recognition software.  Every effort was made to ensure accuracy; however, inadvertent computerized transcription errors may be present.

## 2021-01-17 ENCOUNTER — HOSPITAL ENCOUNTER (INPATIENT)
Age: 56
LOS: 6 days | Discharge: HOME | DRG: 177 | End: 2021-01-23
Attending: EMERGENCY MEDICINE | Admitting: INTERNAL MEDICINE
Payer: COMMERCIAL

## 2021-01-17 ENCOUNTER — APPOINTMENT (OUTPATIENT)
Dept: CT IMAGING | Age: 56
DRG: 177 | End: 2021-01-17
Payer: COMMERCIAL

## 2021-01-17 DIAGNOSIS — R79.89 ELEVATED LFTS: ICD-10-CM

## 2021-01-17 DIAGNOSIS — J96.01 ACUTE HYPOXEMIC RESPIRATORY FAILURE DUE TO COVID-19 (HCC): Primary | ICD-10-CM

## 2021-01-17 DIAGNOSIS — U07.1 ACUTE HYPOXEMIC RESPIRATORY FAILURE DUE TO COVID-19 (HCC): Primary | ICD-10-CM

## 2021-01-17 DIAGNOSIS — J18.9 PNEUMONIA DUE TO ORGANISM: ICD-10-CM

## 2021-01-17 DIAGNOSIS — U07.1 COVID-19: ICD-10-CM

## 2021-01-17 PROBLEM — J12.82 PNEUMONIA DUE TO COVID-19 VIRUS: Status: ACTIVE | Noted: 2021-01-17

## 2021-01-17 LAB
ALBUMIN SERPL-MCNC: 3.6 G/DL (ref 3.5–5.2)
ALP BLD-CCNC: 123 U/L (ref 40–129)
ALT SERPL-CCNC: 134 U/L (ref 0–40)
ANION GAP SERPL CALCULATED.3IONS-SCNC: 14 MMOL/L (ref 7–16)
AST SERPL-CCNC: 80 U/L (ref 0–39)
B.E.: 0.1 MMOL/L (ref -3–3)
BASOPHILS ABSOLUTE: 0.02 E9/L (ref 0–0.2)
BASOPHILS RELATIVE PERCENT: 0.3 % (ref 0–2)
BILIRUB SERPL-MCNC: 0.3 MG/DL (ref 0–1.2)
BUN BLDV-MCNC: 14 MG/DL (ref 6–20)
CALCIUM SERPL-MCNC: 8.8 MG/DL (ref 8.6–10.2)
CHLORIDE BLD-SCNC: 98 MMOL/L (ref 98–107)
CO2: 26 MMOL/L (ref 22–29)
COHB: 0.5 % (ref 0–1.5)
CREAT SERPL-MCNC: 0.9 MG/DL (ref 0.7–1.2)
CRITICAL: ABNORMAL
DATE ANALYZED: ABNORMAL
DATE OF COLLECTION: ABNORMAL
EOSINOPHILS ABSOLUTE: 0 E9/L (ref 0.05–0.5)
EOSINOPHILS RELATIVE PERCENT: 0 % (ref 0–6)
GFR AFRICAN AMERICAN: >60
GFR NON-AFRICAN AMERICAN: >60 ML/MIN/1.73
GLUCOSE BLD-MCNC: 121 MG/DL (ref 74–99)
HCO3: 23.1 MMOL/L (ref 22–26)
HCT VFR BLD CALC: 48.1 % (ref 37–54)
HEMOGLOBIN: 16 G/DL (ref 12.5–16.5)
HHB: 7.8 % (ref 0–5)
IMMATURE GRANULOCYTES #: 0.22 E9/L
IMMATURE GRANULOCYTES %: 2.9 % (ref 0–5)
LAB: ABNORMAL
LACTIC ACID, SEPSIS: 1.5 MMOL/L (ref 0.5–1.9)
LACTIC ACID, SEPSIS: 2.4 MMOL/L (ref 0.5–1.9)
LYMPHOCYTES ABSOLUTE: 0.66 E9/L (ref 1.5–4)
LYMPHOCYTES RELATIVE PERCENT: 8.6 % (ref 20–42)
Lab: ABNORMAL
MCH RBC QN AUTO: 30.2 PG (ref 26–35)
MCHC RBC AUTO-ENTMCNC: 33.3 % (ref 32–34.5)
MCV RBC AUTO: 90.9 FL (ref 80–99.9)
METHB: 0.4 % (ref 0–1.5)
MODE: ABNORMAL
MONOCYTES ABSOLUTE: 0.39 E9/L (ref 0.1–0.95)
MONOCYTES RELATIVE PERCENT: 5.1 % (ref 2–12)
NEUTROPHILS ABSOLUTE: 6.42 E9/L (ref 1.8–7.3)
NEUTROPHILS RELATIVE PERCENT: 83.1 % (ref 43–80)
O2 CONTENT: 19.6 ML/DL
O2 SATURATION: 92.1 % (ref 92–98.5)
O2HB: 91.3 % (ref 94–97)
OPERATOR ID: ABNORMAL
PATIENT TEMP: 37 C
PCO2: 33.1 MMHG (ref 35–45)
PDW BLD-RTO: 13.2 FL (ref 11.5–15)
PH BLOOD GAS: 7.46 (ref 7.35–7.45)
PLATELET # BLD: 271 E9/L (ref 130–450)
PMV BLD AUTO: 10 FL (ref 7–12)
PO2: 64.9 MMHG (ref 75–100)
POTASSIUM REFLEX MAGNESIUM: 4.2 MMOL/L (ref 3.5–5)
PRO-BNP: 110 PG/ML (ref 0–125)
RBC # BLD: 5.29 E12/L (ref 3.8–5.8)
SARS-COV-2, NAAT: DETECTED
SODIUM BLD-SCNC: 138 MMOL/L (ref 132–146)
SOURCE, BLOOD GAS: ABNORMAL
THB: 15.3 G/DL (ref 11.5–16.5)
TIME ANALYZED: 1815
TOTAL PROTEIN: 7.4 G/DL (ref 6.4–8.3)
TROPONIN: <0.01 NG/ML (ref 0–0.03)
WBC # BLD: 7.7 E9/L (ref 4.5–11.5)

## 2021-01-17 PROCEDURE — 82805 BLOOD GASES W/O2 SATURATION: CPT

## 2021-01-17 PROCEDURE — 83880 ASSAY OF NATRIURETIC PEPTIDE: CPT

## 2021-01-17 PROCEDURE — 2580000003 HC RX 258: Performed by: EMERGENCY MEDICINE

## 2021-01-17 PROCEDURE — 6360000004 HC RX CONTRAST MEDICATION: Performed by: RADIOLOGY

## 2021-01-17 PROCEDURE — 84484 ASSAY OF TROPONIN QUANT: CPT

## 2021-01-17 PROCEDURE — 87040 BLOOD CULTURE FOR BACTERIA: CPT

## 2021-01-17 PROCEDURE — U0002 COVID-19 LAB TEST NON-CDC: HCPCS

## 2021-01-17 PROCEDURE — 36415 COLL VENOUS BLD VENIPUNCTURE: CPT

## 2021-01-17 PROCEDURE — 2580000003 HC RX 258: Performed by: INTERNAL MEDICINE

## 2021-01-17 PROCEDURE — 71275 CT ANGIOGRAPHY CHEST: CPT

## 2021-01-17 PROCEDURE — 2500000003 HC RX 250 WO HCPCS: Performed by: EMERGENCY MEDICINE

## 2021-01-17 PROCEDURE — 99284 EMERGENCY DEPT VISIT MOD MDM: CPT

## 2021-01-17 PROCEDURE — 80053 COMPREHEN METABOLIC PANEL: CPT

## 2021-01-17 PROCEDURE — 84145 PROCALCITONIN (PCT): CPT

## 2021-01-17 PROCEDURE — 2060000000 HC ICU INTERMEDIATE R&B

## 2021-01-17 PROCEDURE — 93005 ELECTROCARDIOGRAM TRACING: CPT | Performed by: EMERGENCY MEDICINE

## 2021-01-17 PROCEDURE — 83605 ASSAY OF LACTIC ACID: CPT

## 2021-01-17 PROCEDURE — 99223 1ST HOSP IP/OBS HIGH 75: CPT | Performed by: INTERNAL MEDICINE

## 2021-01-17 PROCEDURE — 6370000000 HC RX 637 (ALT 250 FOR IP): Performed by: INTERNAL MEDICINE

## 2021-01-17 PROCEDURE — 85025 COMPLETE CBC W/AUTO DIFF WBC: CPT

## 2021-01-17 RX ORDER — DIVALPROEX SODIUM 500 MG/1
1000 TABLET, EXTENDED RELEASE ORAL NIGHTLY
COMMUNITY

## 2021-01-17 RX ORDER — BENZONATATE 100 MG/1
100 CAPSULE ORAL 3 TIMES DAILY PRN
Status: DISCONTINUED | OUTPATIENT
Start: 2021-01-17 | End: 2021-01-20

## 2021-01-17 RX ORDER — DIVALPROEX SODIUM 500 MG/1
1000 TABLET, EXTENDED RELEASE ORAL NIGHTLY
Status: DISCONTINUED | OUTPATIENT
Start: 2021-01-17 | End: 2021-01-23 | Stop reason: HOSPADM

## 2021-01-17 RX ORDER — POLYETHYLENE GLYCOL 3350 17 G/17G
17 POWDER, FOR SOLUTION ORAL DAILY PRN
Status: DISCONTINUED | OUTPATIENT
Start: 2021-01-17 | End: 2021-01-23 | Stop reason: HOSPADM

## 2021-01-17 RX ORDER — SODIUM CHLORIDE 0.9 % (FLUSH) 0.9 %
10 SYRINGE (ML) INJECTION EVERY 12 HOURS SCHEDULED
Status: DISCONTINUED | OUTPATIENT
Start: 2021-01-17 | End: 2021-01-23 | Stop reason: HOSPADM

## 2021-01-17 RX ORDER — QUETIAPINE FUMARATE 25 MG/1
50 TABLET, FILM COATED ORAL NIGHTLY
Status: DISCONTINUED | OUTPATIENT
Start: 2021-01-17 | End: 2021-01-23 | Stop reason: HOSPADM

## 2021-01-17 RX ORDER — SODIUM CHLORIDE 0.9 % (FLUSH) 0.9 %
10 SYRINGE (ML) INJECTION EVERY 12 HOURS SCHEDULED
Status: DISCONTINUED | OUTPATIENT
Start: 2021-01-17 | End: 2021-01-17 | Stop reason: SDUPTHER

## 2021-01-17 RX ORDER — 0.9 % SODIUM CHLORIDE 0.9 %
1000 INTRAVENOUS SOLUTION INTRAVENOUS ONCE
Status: COMPLETED | OUTPATIENT
Start: 2021-01-17 | End: 2021-01-17

## 2021-01-17 RX ORDER — FLUTICASONE FUROATE AND VILANTEROL 200; 25 UG/1; UG/1
1 POWDER RESPIRATORY (INHALATION) DAILY
Status: DISCONTINUED | OUTPATIENT
Start: 2021-01-18 | End: 2021-01-17 | Stop reason: CLARIF

## 2021-01-17 RX ORDER — BUDESONIDE AND FORMOTEROL FUMARATE DIHYDRATE 160; 4.5 UG/1; UG/1
2 AEROSOL RESPIRATORY (INHALATION) 2 TIMES DAILY
Status: DISCONTINUED | OUTPATIENT
Start: 2021-01-18 | End: 2021-01-18 | Stop reason: CLARIF

## 2021-01-17 RX ORDER — ACETAMINOPHEN 325 MG/1
650 TABLET ORAL EVERY 6 HOURS PRN
Status: DISCONTINUED | OUTPATIENT
Start: 2021-01-17 | End: 2021-01-23 | Stop reason: HOSPADM

## 2021-01-17 RX ORDER — ALBUTEROL SULFATE 90 UG/1
2 AEROSOL, METERED RESPIRATORY (INHALATION) PRN
Status: DISCONTINUED | OUTPATIENT
Start: 2021-01-17 | End: 2021-01-23 | Stop reason: HOSPADM

## 2021-01-17 RX ORDER — ACETAMINOPHEN 650 MG/1
650 SUPPOSITORY RECTAL EVERY 6 HOURS PRN
Status: DISCONTINUED | OUTPATIENT
Start: 2021-01-17 | End: 2021-01-23 | Stop reason: HOSPADM

## 2021-01-17 RX ORDER — DIVALPROEX SODIUM 500 MG/1
500 TABLET, EXTENDED RELEASE ORAL EVERY MORNING
COMMUNITY
End: 2022-04-27

## 2021-01-17 RX ORDER — PROMETHAZINE HYDROCHLORIDE 25 MG/1
12.5 TABLET ORAL EVERY 6 HOURS PRN
Status: DISCONTINUED | OUTPATIENT
Start: 2021-01-17 | End: 2021-01-23 | Stop reason: HOSPADM

## 2021-01-17 RX ORDER — SODIUM CHLORIDE 0.9 % (FLUSH) 0.9 %
10 SYRINGE (ML) INJECTION PRN
Status: DISCONTINUED | OUTPATIENT
Start: 2021-01-17 | End: 2021-01-17 | Stop reason: SDUPTHER

## 2021-01-17 RX ORDER — ONDANSETRON 2 MG/ML
4 INJECTION INTRAMUSCULAR; INTRAVENOUS EVERY 6 HOURS PRN
Status: DISCONTINUED | OUTPATIENT
Start: 2021-01-17 | End: 2021-01-23 | Stop reason: HOSPADM

## 2021-01-17 RX ORDER — FAMOTIDINE 20 MG/1
20 TABLET, FILM COATED ORAL 2 TIMES DAILY
Status: DISCONTINUED | OUTPATIENT
Start: 2021-01-17 | End: 2021-01-23 | Stop reason: HOSPADM

## 2021-01-17 RX ORDER — 0.9 % SODIUM CHLORIDE 0.9 %
30 INTRAVENOUS SOLUTION INTRAVENOUS PRN
Status: DISCONTINUED | OUTPATIENT
Start: 2021-01-17 | End: 2021-01-23 | Stop reason: HOSPADM

## 2021-01-17 RX ORDER — PANTOPRAZOLE SODIUM 40 MG/1
40 TABLET, DELAYED RELEASE ORAL
Status: DISCONTINUED | OUTPATIENT
Start: 2021-01-18 | End: 2021-01-23 | Stop reason: HOSPADM

## 2021-01-17 RX ORDER — DIVALPROEX SODIUM 500 MG/1
500 TABLET, EXTENDED RELEASE ORAL EVERY MORNING
Status: DISCONTINUED | OUTPATIENT
Start: 2021-01-18 | End: 2021-01-23 | Stop reason: HOSPADM

## 2021-01-17 RX ORDER — QUETIAPINE FUMARATE 100 MG/1
50 TABLET, FILM COATED ORAL NIGHTLY
COMMUNITY
End: 2022-04-27

## 2021-01-17 RX ORDER — SODIUM CHLORIDE 0.9 % (FLUSH) 0.9 %
10 SYRINGE (ML) INJECTION PRN
Status: DISCONTINUED | OUTPATIENT
Start: 2021-01-17 | End: 2021-01-23 | Stop reason: HOSPADM

## 2021-01-17 RX ORDER — DEXAMETHASONE SODIUM PHOSPHATE 10 MG/ML
6 INJECTION, SOLUTION INTRAMUSCULAR; INTRAVENOUS ONCE
Status: DISCONTINUED | OUTPATIENT
Start: 2021-01-17 | End: 2021-01-17

## 2021-01-17 RX ADMIN — BENZONATATE 100 MG: 100 CAPSULE ORAL at 23:37

## 2021-01-17 RX ADMIN — SODIUM CHLORIDE 1000 ML: 9 INJECTION, SOLUTION INTRAVENOUS at 17:21

## 2021-01-17 RX ADMIN — IOPAMIDOL 75 ML: 755 INJECTION, SOLUTION INTRAVENOUS at 19:18

## 2021-01-17 RX ADMIN — QUETIAPINE FUMARATE 50 MG: 25 TABLET ORAL at 23:24

## 2021-01-17 RX ADMIN — DIVALPROEX SODIUM 1000 MG: 500 TABLET, EXTENDED RELEASE ORAL at 23:24

## 2021-01-17 RX ADMIN — FAMOTIDINE 20 MG: 20 TABLET, FILM COATED ORAL at 23:24

## 2021-01-17 RX ADMIN — REMDESIVIR 200 MG: 100 INJECTION, POWDER, LYOPHILIZED, FOR SOLUTION INTRAVENOUS at 20:37

## 2021-01-17 RX ADMIN — Medication 10 ML: at 23:24

## 2021-01-17 ASSESSMENT — ENCOUNTER SYMPTOMS
ABDOMINAL DISTENTION: 0
ABDOMINAL PAIN: 0
NAUSEA: 0
EYE REDNESS: 0
SHORTNESS OF BREATH: 1
DIARRHEA: 0
TACHYPNEA: 1
BACK PAIN: 0
BLOOD IN STOOL: 0
SORE THROAT: 1
WHEEZING: 0
COUGH: 1
VOMITING: 0
CONSTIPATION: 0
RHINORRHEA: 0

## 2021-01-17 NOTE — ED PROVIDER NOTES
HPI  Zhao Henson is a 61 y.o. male with a PMHx significant for hyperlipidemia, cardiomyopathy, asthma, SELENA and bipolar 1 disorder who presents with worsening nonproductive cough and shortness of breath over the last 48 hours. Patient states that he tested positive for Covid on January 9. He states that he had been doing okay and completed a course of both Decadron and Zithromax without issue. However, he states that following completion of those medications he began to develop fevers and states that he never actually felt better. The patient does state that he has had at least 1 episode of status asthmaticus in the past, but he has never been intubated. He states that he was measuring his pulse ox at home and that he was approximately 75% on room air. He does not currently, nor has he ever worn oxygen at home. He endorses intermittent fevers as high as 101.5 °F and states that he has been taking NSAIDs to treat them. Of note, the patient denies classic chest pain, but does endorse \"a weird discomfort in my throat. \"  He also denies recent trauma, chills, fatigue, HA, dizziness, vision changes, congestion, rhinorrhea, neck pain, palpitations, hx of MI, hx of blood clots, hemoptysis, wheezing, abdominal pain, N/V/D/C, hematochezia, melena, dysuria, hematuria, generalized weakness and paresthesias. The patient is currently taking no blood thinners. Tobacco Hx:   reports that he quit smoking about 13 years ago. His smoking use included cigarettes. He has a 5.00 pack-year smoking history. His smokeless tobacco use includes snuff. Alcohol Hx:   reports no history of alcohol use. Illicit Drug Hx:  Ports no history of illicit drug use. The history is provided by the patient. Last Tetanus (if applicable): N/A    Review of Systems   Constitutional: Negative for chills, diaphoresis, fatigue and fever. HENT: Positive for sore throat. Negative for congestion and rhinorrhea.     Eyes: Negative for redness. Respiratory: Positive for cough (Nonproductive) and shortness of breath. Negative for wheezing. Cardiovascular: Negative for chest pain, palpitations and leg swelling. Gastrointestinal: Negative for abdominal distention, abdominal pain, blood in stool, constipation, diarrhea, nausea and vomiting. Genitourinary: Negative for difficulty urinating, dysuria, flank pain, frequency and hematuria. Musculoskeletal: Negative for arthralgias, back pain, myalgias and neck pain. Skin: Negative for rash and wound. Neurological: Negative for dizziness, syncope, speech difficulty, weakness, light-headedness, numbness and headaches. Physical Exam  Vitals signs and nursing note reviewed. Constitutional:       General: He is awake. He is not in acute distress. Appearance: He is not diaphoretic. HENT:      Head: Normocephalic and atraumatic. Right Ear: External ear normal.      Left Ear: External ear normal.      Nose: Nose normal.      Mouth/Throat:      Mouth: Mucous membranes are dry. Pharynx: Oropharynx is clear. Eyes:      General: No scleral icterus. Right eye: No discharge. Left eye: No discharge. Extraocular Movements: Extraocular movements intact. Conjunctiva/sclera: Conjunctivae normal.   Neck:      Musculoskeletal: Normal range of motion and neck supple. No neck rigidity or muscular tenderness. Cardiovascular:      Rate and Rhythm: Regular rhythm. Tachycardia present. Heart sounds: Normal heart sounds. No murmur. No friction rub. No gallop. Comments: Upper extremity and lower extremity distal pulses intact bilaterally +2/4  Pulmonary:      Breath sounds: Rales present. No wheezing or rhonchi. Comments: The patient is notably tachypneic, but is able to speak in full sentences. Slightly decreased air movement noted throughout. Bibasilar crackles noted on auscultation. Chest:      Chest wall: No tenderness. admission. The patients home medications have been reviewed. Allergies: Patient has no known allergies. ------------------------- NURSING NOTES AND VITALS REVIEWED ---------------------------  Date / Time Roomed:  1/17/2021  4:45 PM  ED Bed Assignment:  08/08    The nursing notes within the ED encounter and vital signs as below have been reviewed. /89   Pulse 85   Temp 98.1 °F (36.7 °C) (Oral)   Resp 28   Ht 5' 9\" (1.753 m)   Wt 222 lb (100.7 kg)   SpO2 92%   BMI 32.78 kg/m²   -------------------------------------------------- RESULTS / INTERVENTIONS -------------------------------------------------  All laboratory and radiology tests have been reviewed by this physician.     LABS:  Results for orders placed or performed during the hospital encounter of 01/17/21   CBC Auto Differential   Result Value Ref Range    WBC 7.7 4.5 - 11.5 E9/L    RBC 5.29 3.80 - 5.80 E12/L    Hemoglobin 16.0 12.5 - 16.5 g/dL    Hematocrit 48.1 37.0 - 54.0 %    MCV 90.9 80.0 - 99.9 fL    MCH 30.2 26.0 - 35.0 pg    MCHC 33.3 32.0 - 34.5 %    RDW 13.2 11.5 - 15.0 fL    Platelets 807 726 - 357 E9/L    MPV 10.0 7.0 - 12.0 fL    Neutrophils % 83.1 (H) 43.0 - 80.0 %    Immature Granulocytes % 2.9 0.0 - 5.0 %    Lymphocytes % 8.6 (L) 20.0 - 42.0 %    Monocytes % 5.1 2.0 - 12.0 %    Eosinophils % 0.0 0.0 - 6.0 %    Basophils % 0.3 0.0 - 2.0 %    Neutrophils Absolute 6.42 1.80 - 7.30 E9/L    Immature Granulocytes # 0.22 E9/L    Lymphocytes Absolute 0.66 (L) 1.50 - 4.00 E9/L    Monocytes Absolute 0.39 0.10 - 0.95 E9/L    Eosinophils Absolute 0.00 (L) 0.05 - 0.50 E9/L    Basophils Absolute 0.02 0.00 - 0.20 E9/L   Comprehensive Metabolic Panel w/ Reflex to MG   Result Value Ref Range    Sodium 138 132 - 146 mmol/L    Potassium reflex Magnesium 4.2 3.5 - 5.0 mmol/L    Chloride 98 98 - 107 mmol/L    CO2 26 22 - 29 mmol/L    Anion Gap 14 7 - 16 mmol/L    Glucose 121 (H) 74 - 99 mg/dL    BUN 14 6 - 20 mg/dL    CREATININE 0.9 0.7 - 1.2 mg/dL    GFR Non-African American >60 >=60 mL/min/1.73    GFR African American >60     Calcium 8.8 8.6 - 10.2 mg/dL    Total Protein 7.4 6.4 - 8.3 g/dL    Alb 3.6 3.5 - 5.2 g/dL    Total Bilirubin 0.3 0.0 - 1.2 mg/dL    Alkaline Phosphatase 123 40 - 129 U/L     (H) 0 - 40 U/L    AST 80 (H) 0 - 39 U/L   Troponin   Result Value Ref Range    Troponin <0.01 0.00 - 0.03 ng/mL   Brain Natriuretic Peptide   Result Value Ref Range    Pro- 0 - 125 pg/mL   Lactate, Sepsis   Result Value Ref Range    Lactic Acid, Sepsis 2.4 (H) 0.5 - 1.9 mmol/L   Blood Gas, Arterial   Result Value Ref Range    Date Analyzed 20210117     Time Analyzed 1815     Source: Blood Arterial     pH, Blood Gas 7.462 (H) 7.350 - 7.450    PCO2 33.1 (L) 35.0 - 45.0 mmHg    PO2 64.9 (L) 75.0 - 100.0 mmHg    HCO3 23.1 22.0 - 26.0 mmol/L    B.E. 0.1 -3.0 - 3.0 mmol/L    O2 Sat 92.1 92.0 - 98.5 %    O2Hb 91.3 (L) 94.0 - 97.0 %    COHb 0.5 0.0 - 1.5 %    MetHb 0.4 0.0 - 1.5 %    O2 Content 19.6 mL/dL    HHb 7.8 (H) 0.0 - 5.0 %    tHb (est) 15.3 11.5 - 16.5 g/dL    Mode NC- 6 L     Date Of Collection      Time Collected      Pt Temp 37.0 C     ID [id number]     Lab [id number]     Critical(s) Notified . No Critical Values    COVID-19   Result Value Ref Range    SARS-CoV-2, NAAT DETECTED (A) Not Detected   EKG 12 Lead   Result Value Ref Range    Ventricular Rate 96 BPM    Atrial Rate 96 BPM    P-R Interval 178 ms    QRS Duration 94 ms    Q-T Interval 360 ms    QTc Calculation (Bazett) 454 ms    P Axis 26 degrees    R Axis -2 degrees    T Axis 12 degrees       RADIOLOGY: Interpreted by Radiologist unless otherwise noted. CTA PULMONARY W CONTRAST   Final Result   No evidence of pulmonary embolism. Diffuse and extensive bilateral patchy infiltrates, compatible with atypical,   viral and COVID pneumonia. EKG: As interpreted by this ER physician.   Rate: 96 bpm  Rhythm: Sinus  Axis: normal  ST Segments: no acute change  T-Waves: no acute change  Interpretation: NSR; normal EKG  Comparison: no previous EKG    Oxygen Saturation Interpretation: Abnormal    Meds Given:  Medications   sodium chloride flush 0.9 % injection 10 mL (has no administration in time range)   sodium chloride flush 0.9 % injection 10 mL (has no administration in time range)   remdesivir 200 mg in sodium chloride 0.9 % 250 mL IVPB (200 mg Intravenous New Bag 1/17/21 2037)     Followed by   remdesivir 100 mg in sodium chloride 0.9 % 250 mL IVPB (has no administration in time range)   0.9 % sodium chloride bolus (has no administration in time range)   0.9 % sodium chloride bolus (0 mLs Intravenous Stopped 1/17/21 1911)   iopamidol (ISOVUE-370) 76 % injection 75 mL (75 mLs Intravenous Given 1/17/21 1918)       Procedures:  No procedures performed. --------------------------------- PROGRESS NOTES / ADDITIONAL PROVIDER NOTES ---------------------------------  Consultations:  As outlined below. ED Course:    ED Course as of Jan 17 2109   Rebecca Pickenss Jan 17, 2021   1743 Nursing informed me that the patient's pulse ox was 91% on 4 L of oxygen via nasal cannula. He was increased to 6 L and we will continue to monitor closely. Will order BiPAP if necessary. [ML]   1958 Patient was ordered Decadron, but he states that he took a dose this morning prior to coming into the ER. We will cancel that drug for now. [ML]   2032 I discussed the case with Dr. Maranda Joseph of internal medicine. He agrees to admit the patient for further evaluation and management. [ML]      ED Course User Index  [ML] Jennifer Morris, [address]       0894: All results were discussed with the patient and I have provided specific details regarding the plan to admit the patient. The patient was stable at the time of admission and was without objective evidence of hemodynamic instability.  He was seen in the emergency department by myself and the assigned attending physician, Dr. Drucella Lundborg, who agreed with the assessment, plan and decision to admit as laid out herein. The patient verbalized an understanding and agreement with the plan for admission. All questions were answered and the patient was deemed to be in stable condition at the time of transport. MDM:  Patient presented from for several days of worsening shortness of breath after testing positive for Covid at an outpatient facility approximately 1 week ago. On arrival, the patient was hypertensive, tachycardic, tachypneic and hypoxic with an SPO2 of 84% on room air. The patient was initially placed on 4 L of oxygen via nasal cannula and improved to approximately 88%. He was then on 2 6 L and remained between 90% and 94%. EKG and physical exam were as documented above. Work-up was initiated to further evaluate the patient's presenting complaints. Troponin and proBNP are both normal.  No leukocytosis, lactic acid mildly elevated at 2.4. The patient's ALT and AST are both significantly elevated, but remaining labs were otherwise unremarkable. CT of the chest did not reveal any pulmonary emboli, but architectural changes consistent with COVID-19 pneumonia were noted throughout. Rapid Covid was positive. Given the patient's new oxygen requirement, the decision was made to admit him to the hospital for further evaluation and management. The case was discussed with internal medicine who was in agreement. Orders were placed for remdesivir and consultation with clinical pharmacy. The patient took Decadron at home prior to coming today, so he was not given a dose while in the ER. The patient was stable at the time of his disposition. This patient's ED course included: a personal history and physicial examination, re-evaluation prior to disposition, multiple bedside re-evaluations, IV medications, cardiac monitoring and continuous pulse oximetry    Diagnosis:  1. Acute hypoxemic respiratory failure due to COVID-19 (HCC)    2. Elevated LFTs    3.  Pneumonia due to organism Disposition:  Patient's disposition: Admit to telemetry  Patient's condition is stable. This patient was seen, examined and treated with Dr. Maria L Mcgill. All pertinent aspects of the patient's care were discussed with the attending physician.        Genesis Trujillo DO  Resident  01/17/21 9010

## 2021-01-18 PROBLEM — J44.9 COPD (CHRONIC OBSTRUCTIVE PULMONARY DISEASE) (HCC): Status: ACTIVE | Noted: 2021-01-18

## 2021-01-18 LAB
ALBUMIN SERPL-MCNC: 3.4 G/DL (ref 3.5–5.2)
ALP BLD-CCNC: 107 U/L (ref 40–129)
ALT SERPL-CCNC: 104 U/L (ref 0–40)
ANION GAP SERPL CALCULATED.3IONS-SCNC: 10 MMOL/L (ref 7–16)
AST SERPL-CCNC: 52 U/L (ref 0–39)
BASOPHILS ABSOLUTE: 0.04 E9/L (ref 0–0.2)
BASOPHILS RELATIVE PERCENT: 0.5 % (ref 0–2)
BILIRUB SERPL-MCNC: 0.4 MG/DL (ref 0–1.2)
BUN BLDV-MCNC: 17 MG/DL (ref 6–20)
CALCIUM SERPL-MCNC: 8.5 MG/DL (ref 8.6–10.2)
CHLORIDE BLD-SCNC: 96 MMOL/L (ref 98–107)
CO2: 30 MMOL/L (ref 22–29)
CREAT SERPL-MCNC: 1.1 MG/DL (ref 0.7–1.2)
EKG ATRIAL RATE: 96 BPM
EKG P AXIS: 26 DEGREES
EKG P-R INTERVAL: 178 MS
EKG Q-T INTERVAL: 360 MS
EKG QRS DURATION: 94 MS
EKG QTC CALCULATION (BAZETT): 454 MS
EKG R AXIS: -2 DEGREES
EKG T AXIS: 12 DEGREES
EKG VENTRICULAR RATE: 96 BPM
EOSINOPHILS ABSOLUTE: 0.03 E9/L (ref 0.05–0.5)
EOSINOPHILS RELATIVE PERCENT: 0.4 % (ref 0–6)
GFR AFRICAN AMERICAN: >60
GFR NON-AFRICAN AMERICAN: >60 ML/MIN/1.73
GLUCOSE BLD-MCNC: 104 MG/DL (ref 74–99)
HCT VFR BLD CALC: 48.3 % (ref 37–54)
HEMOGLOBIN: 15.6 G/DL (ref 12.5–16.5)
IMMATURE GRANULOCYTES #: 0.18 E9/L
IMMATURE GRANULOCYTES %: 2.4 % (ref 0–5)
LYMPHOCYTES ABSOLUTE: 0.99 E9/L (ref 1.5–4)
LYMPHOCYTES RELATIVE PERCENT: 13 % (ref 20–42)
MCH RBC QN AUTO: 30 PG (ref 26–35)
MCHC RBC AUTO-ENTMCNC: 32.3 % (ref 32–34.5)
MCV RBC AUTO: 92.9 FL (ref 80–99.9)
MONOCYTES ABSOLUTE: 0.35 E9/L (ref 0.1–0.95)
MONOCYTES RELATIVE PERCENT: 4.6 % (ref 2–12)
NEUTROPHILS ABSOLUTE: 6.02 E9/L (ref 1.8–7.3)
NEUTROPHILS RELATIVE PERCENT: 79.1 % (ref 43–80)
PDW BLD-RTO: 13.4 FL (ref 11.5–15)
PLATELET # BLD: 240 E9/L (ref 130–450)
PMV BLD AUTO: 9.9 FL (ref 7–12)
POTASSIUM REFLEX MAGNESIUM: 3.9 MMOL/L (ref 3.5–5)
PROCALCITONIN: 0.08 NG/ML (ref 0–0.08)
RBC # BLD: 5.2 E12/L (ref 3.8–5.8)
SODIUM BLD-SCNC: 136 MMOL/L (ref 132–146)
TOTAL PROTEIN: 7 G/DL (ref 6.4–8.3)
WBC # BLD: 7.6 E9/L (ref 4.5–11.5)

## 2021-01-18 PROCEDURE — 94640 AIRWAY INHALATION TREATMENT: CPT

## 2021-01-18 PROCEDURE — 6360000002 HC RX W HCPCS: Performed by: INTERNAL MEDICINE

## 2021-01-18 PROCEDURE — 2060000000 HC ICU INTERMEDIATE R&B

## 2021-01-18 PROCEDURE — 2700000000 HC OXYGEN THERAPY PER DAY

## 2021-01-18 PROCEDURE — 99232 SBSQ HOSP IP/OBS MODERATE 35: CPT | Performed by: INTERNAL MEDICINE

## 2021-01-18 PROCEDURE — 93010 ELECTROCARDIOGRAM REPORT: CPT | Performed by: INTERNAL MEDICINE

## 2021-01-18 PROCEDURE — 80053 COMPREHEN METABOLIC PANEL: CPT

## 2021-01-18 PROCEDURE — 36415 COLL VENOUS BLD VENIPUNCTURE: CPT

## 2021-01-18 PROCEDURE — 2580000003 HC RX 258: Performed by: INTERNAL MEDICINE

## 2021-01-18 PROCEDURE — 2500000003 HC RX 250 WO HCPCS: Performed by: INTERNAL MEDICINE

## 2021-01-18 PROCEDURE — 6370000000 HC RX 637 (ALT 250 FOR IP): Performed by: INTERNAL MEDICINE

## 2021-01-18 PROCEDURE — 85025 COMPLETE CBC W/AUTO DIFF WBC: CPT

## 2021-01-18 RX ORDER — FLUTICASONE FUROATE AND VILANTEROL 200; 25 UG/1; UG/1
1 POWDER RESPIRATORY (INHALATION) DAILY
Status: DISCONTINUED | OUTPATIENT
Start: 2021-01-18 | End: 2021-01-23 | Stop reason: HOSPADM

## 2021-01-18 RX ORDER — METHYLPREDNISOLONE SODIUM SUCCINATE 40 MG/ML
40 INJECTION, POWDER, LYOPHILIZED, FOR SOLUTION INTRAMUSCULAR; INTRAVENOUS EVERY 12 HOURS
Status: DISCONTINUED | OUTPATIENT
Start: 2021-01-18 | End: 2021-01-19

## 2021-01-18 RX ADMIN — REMDESIVIR 100 MG: 100 INJECTION, POWDER, LYOPHILIZED, FOR SOLUTION INTRAVENOUS at 21:21

## 2021-01-18 RX ADMIN — BENZONATATE 100 MG: 100 CAPSULE ORAL at 19:33

## 2021-01-18 RX ADMIN — METHYLPREDNISOLONE SODIUM SUCCINATE 40 MG: 40 INJECTION, POWDER, FOR SOLUTION INTRAMUSCULAR; INTRAVENOUS at 10:17

## 2021-01-18 RX ADMIN — Medication 10 ML: at 21:39

## 2021-01-18 RX ADMIN — FLUTICASONE FUROATE AND VILANTEROL 1 PUFF: 200; 25 POWDER RESPIRATORY (INHALATION) at 09:23

## 2021-01-18 RX ADMIN — Medication 10 ML: at 09:12

## 2021-01-18 RX ADMIN — DIVALPROEX SODIUM 1000 MG: 500 TABLET, EXTENDED RELEASE ORAL at 23:50

## 2021-01-18 RX ADMIN — METHYLPREDNISOLONE SODIUM SUCCINATE 40 MG: 40 INJECTION, POWDER, FOR SOLUTION INTRAMUSCULAR; INTRAVENOUS at 21:24

## 2021-01-18 RX ADMIN — FAMOTIDINE 20 MG: 20 TABLET, FILM COATED ORAL at 09:12

## 2021-01-18 RX ADMIN — ENOXAPARIN SODIUM 40 MG: 40 INJECTION SUBCUTANEOUS at 09:12

## 2021-01-18 RX ADMIN — PANTOPRAZOLE SODIUM 40 MG: 40 TABLET, DELAYED RELEASE ORAL at 05:42

## 2021-01-18 RX ADMIN — QUETIAPINE FUMARATE 50 MG: 25 TABLET ORAL at 21:24

## 2021-01-18 RX ADMIN — FAMOTIDINE 20 MG: 20 TABLET, FILM COATED ORAL at 21:24

## 2021-01-18 RX ADMIN — DIVALPROEX SODIUM 500 MG: 500 TABLET, EXTENDED RELEASE ORAL at 09:12

## 2021-01-18 NOTE — H&P
[address] [place], [place] [address]ist Group   HISTORY AND PHYSICAL EXAM      AUTHOR: Guy Harris PATIENT NAME: Renaldo Jaimes II   DATE: 2021 MRN: [id number], : 1965   Primary Care Physician: Varghese Banuelos MD     CHIEF COMPLAINT / REASON FOR ADMISSION:  Shortness of Breath, Cough, COVID positve    HPI:   This is a 61 y.o. male  has a past medical history of Asthma, Bipolar 1 disorder ([facility] Cardiomyopathy ([address] COPD (chronic obstructive pulmonary disease) ([address] Depression, Hyperlipemia, Pneumonia, and TFCC (triangular fibrocartilage complex) tear. presented with worsening Shortness of Breath, Cough for last few days prior to arrival to the hospital. SOB is associated with some cough, nonproductive but no fever or chills reported. Initially SOB was mild, intermittent but gradually getting more persistent. Started gradually. Exacerbated by general activity or exertion. Relieved only partially by rest.  The patient was seen and examined at bedside, appears alert and awake with no acute distress and is able to answer simple  questions. On direct questioning, patient denied any  resting ongoing chest pain, hemoptysis, ongoing palpitation, active abdominal pain, hematemesis, rectal bleeding, robby, hematuria, any other  and GI complaints and any new focal neuro deficits.     ROS:  Pertinent positives and negatives are noted in the HPI, all other systems are reviewed and negative    PMH:  Past Medical History:   Diagnosis Date    Asthma     Bipolar 1 disorder ([facility]     Cardiomyopathy ([facility]     [facility]    Depression     Hyperlipemia     Pneumonia     TFCC (triangular fibrocartilage complex) tear 12/15/2014       Surgical History:  Past Surgical History:   Procedure Laterality Date    ANESTHESIA NERVE BLOCK Left 2019    LEFT C3,4,5 MEDIAL BRANCH BLOCK performed by Darrall Mcburney, MD at [facility] [address]  FINGER SURGERY Left 1983    pinkie    HERNIA REPAIR      inguinal and umbical    NERVE BLOCK Left 11/01/2018    medial branch block    NERVE BLOCK Left 01/24/2019    cervical mbb    OTHER SURGICAL HISTORY Right feb 2014    repair tricepts tondon    VT INJ DX/THER AGNT PARAVERT FACET JOINT, CERV/THORAC, 1ST LEVEL Left 11/1/2018    LEFT C3 C4 C5 MEDIAL BRANCH BLOCK performed by Charmaine Ye MD at [place] [address] Left 10/02/2019    Dr Yuridia Torre [facility]        Medications Prior to Admission:    Prior to Admission medications    Medication Sig Start Date End Date Taking?  Authorizing Provider   QUEtiapine (SEROQUEL) 100 MG tablet Take 50 mg by mouth nightly   Yes Historical Provider, MD   divalproex (DEPAKOTE ER) 500 MG extended release tablet Take 500 mg by mouth every morning   Yes Historical Provider, MD   divalproex (DEPAKOTE ER) 500 MG extended release tablet Take 1,000 mg by mouth nightly   Yes Historical Provider, MD   Promethazine-DM (PROMETHAZINE-DEXTROMETHORPHAN) 6.25-15 MG/5ML SOLN solution Take 5 mLs by mouth 4 times daily 1/13/21  Yes MELANIE Junior CNP   ipratropium-albuterol (DUONEB) 0.5-2.5 (3) MG/3ML SOLN nebulizer solution Take 3 mLs by nebulization every 6 hours 1/13/21 4/13/21 Yes MELANIE Junior CNP   albuterol sulfate  (90 Base) MCG/ACT inhaler Inhale 2 puffs into the lungs as needed for Wheezing 1/13/21  Yes MELANIE Junior CNP   benzonatate (TESSALON PERLES) 100 MG capsule Take 1 capsule by mouth 3 times daily as needed for Cough 1/13/21 1/20/21 Yes MELANIE Junior CNP   dexamethasone (DECADRON) 6 MG tablet Take 1 tablet by mouth daily for 7 days 1/11/21 1/18/21 Yes MELANIE Dutton CNP   Fluticasone furoate-vilanterol (BREO ELLIPTA) 200-25 MCG/INH AEPB inhaler Inhale 1 puff into the lungs daily 12/2/20  Yes Marily Haskins MD   esomeprazole ([address]) 40 MG delayed release capsule Take 1 capsule by mouth every morning (before breakfast) 5/8/20  Yes Kim Jackson MD   ondansetron (ZOFRAN-ODT) 8 MG TBDP disintegrating tablet Take 0.5 tablets by mouth every 8 hours as needed for Nausea or Vomiting 3/19/20  Yes Kim Jackson MD   tadalafil (CIALIS) 10 MG tablet TAKE 1 TABLET BY MOUTH AS NEEDED AN HOUR BEFORE SEX. DO NOT TAKE MORE THAN 1 TIME IN 24 HOURS. THIS IS NOT A DAILY MEDICINE. 8/7/19  Yes Historical Provider, MD   fluticasone (VERAMYST) 27.5 MCG/SPRAY nasal spray 2 sprays by Nasal route daily as needed for Rhinitis    Yes Historical Provider, MD       Allergies:    Patient has no known allergies. Social History:    reports that he quit smoking about 13 years ago. His smoking use included cigarettes. He has a 5.00 pack-year smoking history. His smokeless tobacco use includes snuff. He reports that he does not drink alcohol or use drugs. Family History:   family history is not on file. PHYSICAL EXAM:  Vitals:  BP (!) 95/50   Pulse 87   Temp 97.5 °F (36.4 °C) (Infrared)   Resp 13   Ht 5' 9\" (1.753 m)   Wt 222 lb (100.7 kg)   SpO2 92%   BMI 32.78 kg/m²   GENERAL: No acute distress, Alert and awake, Afebrile, Appears tired and weak otherwise hemodynamically stable at present. HEENT: PERRLA, no icterus. OP clear and no exudates. NECK: Supple  no carotid/ophthalmic bruits, JVD None. RESPIRATORY:  Bilateral equal vesicular with prolonged expiration breath sound with diffused bilateral inspiratory & expiratory wheezing. Lung bases are clear. HEART: No tachycardia at bedside and regular rhythm. Normal S1 and S2, No S3 or S4 is audible. No pulsation, thrills, murmur or friction rubs. ABDOMEN: Soft, nondistended, nontender. No hepatomegaly or splenomegaly. No CVA tenderness on the both sides. Bowel sound is present. EXTREMITIES: All peripheral pulses are present. No calf tenderness or swelling. No pedal edema is present. [facility] NEUROLOGY: Alert and awake.  No new focal neuro deficit. Bilateral Pupil is equal and reactive to light. CN-ii-xii otherwise grossly intact. Motor and Sensory: Grossly Intact bilaterally with no new focal signs     LABS:  Recent Labs     01/17/21  1712   WBC 7.7   RBC 5.29   HGB 16.0   HCT 48.1   MCV 90.9   MCH 30.2   MCHC 33.3   RDW 13.2      MPV 10.0     Recent Labs     01/17/21  1712      K 4.2   CL 98   CO2 26   BUN 14   CREATININE 0.9   GLUCOSE 121*   CALCIUM 8.8     No results for input(s): POCGLU in the last 72 hours.   Results for orders placed or performed during the hospital encounter of 01/17/21   CBC Auto Differential   Result Value Ref Range    WBC 7.7 4.5 - 11.5 E9/L    RBC 5.29 3.80 - 5.80 E12/L    Hemoglobin 16.0 12.5 - 16.5 g/dL    Hematocrit 48.1 37.0 - 54.0 %    MCV 90.9 80.0 - 99.9 fL    MCH 30.2 26.0 - 35.0 pg    MCHC 33.3 32.0 - 34.5 %    RDW 13.2 11.5 - 15.0 fL    Platelets 816 952 - 004 E9/L    MPV 10.0 7.0 - 12.0 fL    Neutrophils % 83.1 (H) 43.0 - 80.0 %    Immature Granulocytes % 2.9 0.0 - 5.0 %    Lymphocytes % 8.6 (L) 20.0 - 42.0 %    Monocytes % 5.1 2.0 - 12.0 %    Eosinophils % 0.0 0.0 - 6.0 %    Basophils % 0.3 0.0 - 2.0 %    Neutrophils Absolute 6.42 1.80 - 7.30 E9/L    Immature Granulocytes # 0.22 E9/L    Lymphocytes Absolute 0.66 (L) 1.50 - 4.00 E9/L    Monocytes Absolute 0.39 0.10 - 0.95 E9/L    Eosinophils Absolute 0.00 (L) 0.05 - 0.50 E9/L    Basophils Absolute 0.02 0.00 - 0.20 E9/L   Comprehensive Metabolic Panel w/ Reflex to MG   Result Value Ref Range    Sodium 138 132 - 146 mmol/L    Potassium reflex Magnesium 4.2 3.5 - 5.0 mmol/L    Chloride 98 98 - 107 mmol/L    CO2 26 22 - 29 mmol/L    Anion Gap 14 7 - 16 mmol/L    Glucose 121 (H) 74 - 99 mg/dL    BUN 14 6 - 20 mg/dL    CREATININE 0.9 0.7 - 1.2 mg/dL    GFR Non-African American >60 >=60 mL/min/1.73    GFR African American >60     Calcium 8.8 8.6 - 10.2 mg/dL    Total Protein 7.4 6.4 - 8.3 g/dL    Alb 3.6 3.5 - 5.2 g/dL    Total Bilirubin 0.3 0.0 - 1.2 mg/dL    Alkaline Phosphatase 123 40 - 129 U/L     (H) 0 - 40 U/L    AST 80 (H) 0 - 39 U/L   Troponin   Result Value Ref Range    Troponin <0.01 0.00 - 0.03 ng/mL   Brain Natriuretic Peptide   Result Value Ref Range    Pro- 0 - 125 pg/mL   Lactate, Sepsis   Result Value Ref Range    Lactic Acid, Sepsis 2.4 (H) 0.5 - 1.9 mmol/L   Lactate, Sepsis   Result Value Ref Range    Lactic Acid, Sepsis 1.5 0.5 - 1.9 mmol/L   Blood Gas, Arterial   Result Value Ref Range    Date Analyzed 20210117     Time Analyzed 1815     Source: Blood Arterial     pH, Blood Gas 7.462 (H) 7.350 - 7.450    PCO2 33.1 (L) 35.0 - 45.0 mmHg    PO2 64.9 (L) 75.0 - 100.0 mmHg    HCO3 23.1 22.0 - 26.0 mmol/L    B.E. 0.1 -3.0 - 3.0 mmol/L    O2 Sat 92.1 92.0 - 98.5 %    O2Hb 91.3 (L) 94.0 - 97.0 %    COHb 0.5 0.0 - 1.5 %    MetHb 0.4 0.0 - 1.5 %    O2 Content 19.6 mL/dL    HHb 7.8 (H) 0.0 - 5.0 %    tHb (est) 15.3 11.5 - 16.5 g/dL    Mode NC- 6 L     Date Of Collection      Time Collected      Pt Temp 37.0 C     ID [id number]     Lab [id number]     Critical(s) Notified . No Critical Values    COVID-19   Result Value Ref Range    SARS-CoV-2, NAAT DETECTED (A) Not Detected   EKG 12 Lead   Result Value Ref Range    Ventricular Rate 96 BPM    Atrial Rate 96 BPM    P-R Interval 178 ms    QRS Duration 94 ms    Q-T Interval 360 ms    QTc Calculation (Bazett) 454 ms    P Axis 26 degrees    R Axis -2 degrees    T Axis 12 degrees     ED Course as of Jan 18 0601   Sun Jan 17, 2021   2834 [address] 17-M Nursing informed me that the patient's pulse ox was 91% on 4 L of oxygen via nasal cannula. He was increased to 6 L and we will continue to monitor closely. Will order BiPAP if necessary. [ML]   1958 Patient was ordered Decadron, but he states that he took a dose this morning prior to coming into the ER. We will cancel that drug for now. [ML]   2032 I discussed the case with Dr. Carol Sterling of internal medicine.   He agrees to admit the patient for further evaluation and management. [ML]      ED Course User Index  [ML] Jeanie Goodell,      Radiology: Cta Pulmonary W Contrast    Result Date: 1/17/2021  EXAMINATION: CTA OF THE CHEST 1/17/2021 7:19 pm TECHNIQUE: CTA of the chest was performed after the administration of intravenous contrast.  Multiplanar reformatted images are provided for review. MIP images are provided for review. Dose modulation, iterative reconstruction, and/or weight based adjustment of the mA/kV was utilized to reduce the radiation dose to as low as reasonably achievable. COMPARISON: None. HISTORY: ORDERING SYSTEM PROVIDED HISTORY: Worsening SOB, chest pain, Covid + TECHNOLOGIST PROVIDED HISTORY: Reason for exam:->Worsening SOB, chest pain, Covid + FINDINGS: Pulmonary Arteries: Pulmonary arteries are adequately opacified for evaluation. No evidence of intraluminal filling defect to suggest pulmonary embolism. Main pulmonary artery is normal in caliber. Mediastinum: No evidence of mediastinal lymphadenopathy. The heart and pericardium demonstrate no acute abnormality. There is no acute abnormality of the thoracic aorta. Lungs/pleura: There is extensive and diffuse bilateral patchy infiltrates with relative sparing of the lung apices compared to rest of the lungs. No evidence of pleural effusion or pneumothorax. Upper Abdomen: Limited images of the upper abdomen are unremarkable. Soft Tissues/Bones: No acute bone or soft tissue abnormality. No evidence of pulmonary embolism. Diffuse and extensive bilateral patchy infiltrates, compatible with atypical, viral and COVID pneumonia.     ASSESSMENT:    Present on Admission:   Pneumonia due to COVID-19 virus   Bipolar 1 disorder ([facility]   COPD [facility]chronic obstructive pulmonary disease) ([facility])    PLAN:  # Pneumonia + Hypoxia+ Covid19 infection   Has Positive imaging suggestive of pneumonia   Septic workup with cultures in pregress   Started with Remdesivir, Just finished taking Decadron as outpatient   Albuterol inhaler Q4-6hour PRN and Humidified Oxygen @2-6 L/min   Monitor Vitals /Telemetry  # COPD - Stable   Continue Inhaled steroid + Albuterol PRN inhalation and PRN Duoneb  # Bipolar Mood disorder   Currently stable with no acute changes   Will resume/adjust medications as on chart  # Rest of the chronic medical problems are stable and will be managed with appropriately with home medications, placed nursing communication order to verify home medications before giving them to the patient. # Diet: On PO Diet  # IVF's: No  # Fall Precaution: Yes  # Disposition: Home/primary residence   # Code Status: Full code by default  # DVT Prophylaxis : SC Heparin or SC Lovenox as on chart  The patient at bedside was counseled about clinical status, laboratory/imaging results, diagnoses, medication side effects, risk, and treatment plan, all questions were answered to patient's satisfaction and verbalized understanding    SIGNATURE: Danny Joy PATIENT NAME: Nuris Lawler [phone] #: Hospitalist on call MRN: [id number]     Disclaimer: Portions of this note may have been generated using Dragon voice recognition software. Reasonable efforts were made to correct any dictation errors that resulted due to the programming of this software but some may still be present.

## 2021-01-18 NOTE — ED NOTES
When pt coughs or exhurts self, O2 goes as low as 84% on 6 liters.  Pt recovers to 93 % on 6 liters when still     Bunny Julien RN  01/17/21 1952

## 2021-01-18 NOTE — ED NOTES
PT put on high flow 15 liter. Pt saturation dropping to 84-88% on 6 liter nasal canula. Pt now 93% O2.  notified and visited pt at bedside.       Maribeth Armendariz RN  01/17/21 8999

## 2021-01-18 NOTE — PROGRESS NOTES
Admitting Date and Time: 1/17/2021  4:45 PM  Admit Dx: Pneumonia due to COVID-19 virus [U07.1, J12.82]    Subjective:    Pt feels l;ess sob and coughing  Per RN: no issues    ROS: denies fever, chills, cp, sob, n/v, HA unless stated above.      Fluticasone furoate-vilanterol  1 puff Inhalation Daily    remdesivir IVPB  100 mg Intravenous Q24H    divalproex  500 mg Oral QAM    divalproex  1,000 mg Oral Nightly    pantoprazole  40 mg Oral QAM AC    QUEtiapine  50 mg Oral Nightly    sodium chloride flush  10 mL Intravenous 2 times per day    enoxaparin  40 mg Subcutaneous Daily    famotidine  20 mg Oral BID         sodium chloride, 30 mL, PRN      albuterol sulfate HFA, 2 puff, PRN      benzonatate, 100 mg, TID PRN      sodium chloride flush, 10 mL, PRN      promethazine, 12.5 mg, Q6H PRN    Or      ondansetron, 4 mg, Q6H PRN      polyethylene glycol, 17 g, Daily PRN      acetaminophen, 650 mg, Q6H PRN    Or      acetaminophen, 650 mg, Q6H PRN         Objective:    BP (!) 95/50   Pulse 87   Temp 97.5 °F (36.4 °C) (Infrared)   Resp 13   Ht 5' 9\" (1.753 m)   Wt 222 lb (100.7 kg)   SpO2 92%   BMI 32.78 kg/m²   General Appearance: alert and oriented to person, place and time and in no acute distress  Skin: warm and dry  Head: normocephalic and atraumatic  Eyes: pupils equal, round, and reactive to light, extraocular eye movements intact, conjunctivae normal  Neck: neck supple and non tender without mass   Pulmonary/Chest: clear to auscultation bilaterally- no wheezes, rales or rhonchi, normal air movement, no respiratory distress  Cardiovascular: normal rate, normal S1 and S2 and no carotid bruits  Abdomen: soft, non-tender, non-distended, normal bowel sounds, no masses or organomegaly  Extremities: no cyanosis, no clubbing and no edema  Neurologic: no cranial nerve deficit and speech normal      Recent Labs     01/17/21  1712      K 4.2   CL 98   CO2 26   BUN 14   CREATININE 0.9   GLUCOSE 121*   CALCIUM 8.8       Recent Labs     01/17/21  1712   ALKPHOS 123   PROT 7.4   LABALBU 3.6   BILITOT 0.3   AST 80*   *       Recent Labs     01/17/21  1712   WBC 7.7   RBC 5.29   HGB 16.0   HCT 48.1   MCV 90.9   MCH 30.2   MCHC 33.3   RDW 13.2      MPV 10.0           Radiology:   CTA PULMONARY W CONTRAST   Final Result   No evidence of pulmonary embolism. Diffuse and extensive bilateral patchy infiltrates, compatible with atypical,   viral and COVID pneumonia. Assessment:  Active Problems:    Pneumonia due to COVID-19 virus    Bipolar 1 disorder (HCC)    COPD (chronic obstructive pulmonary disease) ([facility])  Resolved Problems:    * No resolved hospital problems. *      Plan:  Presented with worsening Shortness of Breath, Cough for last few days prior to arrival to the hospital. SOB is associated with some cough, nonproductive but no fever or chills reported. Initially SOB was mild, intermittent but gradually getting more persistent. Started gradually. Exacerbated by general activity or exertion. Relieved only partially by rest.       1. Pneumonia + acute Hypoxic respiratory failure+ Covid19 infection  Imaging suggestive of pneumonia  Septic workup with cultures in pregress  Remdesivir. Finished deacron Sunday am  1/17. Humidified Oxygen:  1/18: 10 liters  2. COPD, treat as if in acute exacerbation with Restart steroids with sm 40 iv bid on 1/18. Continue Inhaled steroid + Albuterol PRN inhalation and PRN Duoneb  3 Bipolar Mood disorder: stable with no acute changes to home meds  4. No change to outpatient treatment regimen for the existing stable combordities including:  Cardiomyopathy ([address] Hyperlipemia. Note h/o Triangular fibrocartilage complex tear. 5. Disposition: Home/primary residence   6 Full code  7.  DVT Prophylaxis : SC Lovenox as  All questions were answered to patient's satisfaction and verbalized understanding    NOTE: This report was transcribed using voice recognition software. Every effort was made to ensure accuracy; however, inadvertent computerized transcription errors may be present.      Electronically signed by Chong Juan MD on 1/18/2021 at 9:07 AM

## 2021-01-18 NOTE — ACP (ADVANCE CARE PLANNING)
Advance Care Planning     Advance Care Planning Activator (Inpatient)  Conversation Note      Date of ACP Conversation: 1/18/2021  Conversation Conducted with: Patient  ACP Activator: Stu Wesley RN    *When Decision Maker makes decisions on behalf of the incapacitated patient: Decision Maker is asked to consider and make decisions based on patient values, known preferences, or best interests. Health Care Decision Maker:     Current Designated Health Care Decision Maker:   Primary Decision Maker: Radha Ryan - [phone]    Note: If the relationship of these Decision-Makers to the patient does NOT follow your state's Next of Kin hierarchy, recommend that patient complete ACP document that meets state-specific requirements to allow them to act on the patient's behalf when appropriate. Care Preferences    Ventilation: \"If you were in your present state of health and suddenly became very ill and were unable to breathe on your own, what would your preference be about the use of a ventilator (breathing machine) if it were available to you? \"      Would the patient desire the use of ventilator (breathing machine)?: Patient states \"If my medical care team says it is the best course of action, then yes. \"    \"If your health worsens and it becomes clear that your chance of recovery is unlikely, what would your preference be about the use of a ventilator (breathing machine) if it were available to you? \"     Would the patient desire the use of ventilator (breathing machine)?:  Patient states \"If my medical care team says it is the best course of action, then yes. \"      Resuscitation  \"CPR works best to restart the heart when there is a sudden event, like a heart attack, in someone who is otherwise healthy. Unfortunately, CPR does not typically restart the heart for people who have serious health conditions or who are very sick. \"    \"In the event your heart stopped as a result of an underlying serious health condition, would you want attempts to be made to restart your heart (answer \"yes\" for attempt to resuscitate) or would you prefer a natural death (answer \"no\" for do not attempt to resuscitate)? \" yes      NOTE: If the patient has a valid advance directive AND now provides care preference(s) that are inconsistent with that prior directive, advise the patient to consider either: creating a new advance directive that complies with state-specific requirements; or, if that is not possible, orally revoking that prior directive in accordance with state-specific requirements, which must be documented in the EHR. [] Yes   [x] No   Educated Patient / Josiah Ramirezry regarding differences between Advance Directives and portable DNR orders.     Length of ACP Conversation in minutes:      Conversation Outcomes:  [x] ACP discussion completed  [] Existing advance directive reviewed with patient; no changes to patient's previously recorded wishes  [] New Advance Directive completed  [] Portable Do Not Rescitate prepared for Provider review and signature  [] POLST/POST/MOLST/MOST prepared for Provider review and signature      Follow-up plan:    [] Schedule follow-up conversation to continue planning  [] Referred individual to Provider for additional questions/concerns   [] Advised patient/agent/surrogate to review completed ACP document and update if needed with changes in condition, patient preferences or care setting    [x] This note routed to one or more involved healthcare providers

## 2021-01-19 LAB
ALBUMIN SERPL-MCNC: 3.8 G/DL (ref 3.5–5.2)
ALP BLD-CCNC: 134 U/L (ref 40–129)
ALT SERPL-CCNC: 106 U/L (ref 0–40)
ANION GAP SERPL CALCULATED.3IONS-SCNC: 11 MMOL/L (ref 7–16)
AST SERPL-CCNC: 55 U/L (ref 0–39)
BILIRUB SERPL-MCNC: 0.4 MG/DL (ref 0–1.2)
BUN BLDV-MCNC: 20 MG/DL (ref 6–20)
CALCIUM SERPL-MCNC: 9.1 MG/DL (ref 8.6–10.2)
CHLORIDE BLD-SCNC: 97 MMOL/L (ref 98–107)
CO2: 32 MMOL/L (ref 22–29)
CREAT SERPL-MCNC: 0.9 MG/DL (ref 0.7–1.2)
GFR AFRICAN AMERICAN: >60
GFR NON-AFRICAN AMERICAN: >60 ML/MIN/1.73
GLUCOSE BLD-MCNC: 109 MG/DL (ref 74–99)
POTASSIUM SERPL-SCNC: 5.2 MMOL/L (ref 3.5–5)
SODIUM BLD-SCNC: 140 MMOL/L (ref 132–146)
TOTAL PROTEIN: 7.3 G/DL (ref 6.4–8.3)

## 2021-01-19 PROCEDURE — 6370000000 HC RX 637 (ALT 250 FOR IP): Performed by: INTERNAL MEDICINE

## 2021-01-19 PROCEDURE — 2580000003 HC RX 258: Performed by: INTERNAL MEDICINE

## 2021-01-19 PROCEDURE — 36415 COLL VENOUS BLD VENIPUNCTURE: CPT

## 2021-01-19 PROCEDURE — 2060000000 HC ICU INTERMEDIATE R&B

## 2021-01-19 PROCEDURE — 2700000000 HC OXYGEN THERAPY PER DAY

## 2021-01-19 PROCEDURE — 99232 SBSQ HOSP IP/OBS MODERATE 35: CPT | Performed by: INTERNAL MEDICINE

## 2021-01-19 PROCEDURE — 80053 COMPREHEN METABOLIC PANEL: CPT

## 2021-01-19 PROCEDURE — 94640 AIRWAY INHALATION TREATMENT: CPT

## 2021-01-19 PROCEDURE — 6360000002 HC RX W HCPCS: Performed by: INTERNAL MEDICINE

## 2021-01-19 PROCEDURE — 2500000003 HC RX 250 WO HCPCS: Performed by: INTERNAL MEDICINE

## 2021-01-19 RX ORDER — METHYLPREDNISOLONE SODIUM SUCCINATE 40 MG/ML
30 INJECTION, POWDER, LYOPHILIZED, FOR SOLUTION INTRAMUSCULAR; INTRAVENOUS EVERY 12 HOURS
Status: DISCONTINUED | OUTPATIENT
Start: 2021-01-19 | End: 2021-01-21

## 2021-01-19 RX ADMIN — METHYLPREDNISOLONE SODIUM SUCCINATE 30 MG: 40 INJECTION, POWDER, FOR SOLUTION INTRAMUSCULAR; INTRAVENOUS at 21:04

## 2021-01-19 RX ADMIN — BENZONATATE 100 MG: 100 CAPSULE ORAL at 18:10

## 2021-01-19 RX ADMIN — ALBUTEROL SULFATE 2 PUFF: 90 AEROSOL, METERED RESPIRATORY (INHALATION) at 09:36

## 2021-01-19 RX ADMIN — BENZONATATE 100 MG: 100 CAPSULE ORAL at 08:55

## 2021-01-19 RX ADMIN — ENOXAPARIN SODIUM 40 MG: 40 INJECTION SUBCUTANEOUS at 08:55

## 2021-01-19 RX ADMIN — FAMOTIDINE 20 MG: 20 TABLET, FILM COATED ORAL at 08:55

## 2021-01-19 RX ADMIN — DIVALPROEX SODIUM 500 MG: 500 TABLET, EXTENDED RELEASE ORAL at 08:55

## 2021-01-19 RX ADMIN — Medication 10 ML: at 08:55

## 2021-01-19 RX ADMIN — QUETIAPINE FUMARATE 50 MG: 25 TABLET ORAL at 21:04

## 2021-01-19 RX ADMIN — Medication 10 ML: at 21:12

## 2021-01-19 RX ADMIN — METHYLPREDNISOLONE SODIUM SUCCINATE 40 MG: 40 INJECTION, POWDER, FOR SOLUTION INTRAMUSCULAR; INTRAVENOUS at 08:55

## 2021-01-19 RX ADMIN — FAMOTIDINE 20 MG: 20 TABLET, FILM COATED ORAL at 21:04

## 2021-01-19 RX ADMIN — ACETAMINOPHEN 650 MG: 325 TABLET, FILM COATED ORAL at 18:10

## 2021-01-19 RX ADMIN — PANTOPRAZOLE SODIUM 40 MG: 40 TABLET, DELAYED RELEASE ORAL at 05:25

## 2021-01-19 RX ADMIN — REMDESIVIR 100 MG: 100 INJECTION, POWDER, LYOPHILIZED, FOR SOLUTION INTRAVENOUS at 21:03

## 2021-01-19 RX ADMIN — FLUTICASONE FUROATE AND VILANTEROL 1 PUFF: 200; 25 POWDER RESPIRATORY (INHALATION) at 09:30

## 2021-01-19 RX ADMIN — DIVALPROEX SODIUM 1000 MG: 500 TABLET, EXTENDED RELEASE ORAL at 21:03

## 2021-01-19 ASSESSMENT — PAIN SCALES - GENERAL
PAINLEVEL_OUTOF10: 0
PAINLEVEL_OUTOF10: 0

## 2021-01-19 NOTE — PROGRESS NOTES
Ambulated patient on 2L and SpO2 decreased to 83%. O2 titrated up to 6 L in order to obtain SpO2 greater than 88%. Denies SOB. Ambulated total of 150 feet.

## 2021-01-19 NOTE — PROGRESS NOTES
Admitting Date and Time: 1/17/2021  4:45 PM  Admit Dx: Pneumonia due to COVID-19 virus [U07.1, J12.82]    Subjective:    even less sob and coughing. More hoarse than usual  Per RN: no issues. Patient says rn says no thrush    ROS: denies fever, chills, cp, sob, n/v, HA unless stated above.      methylPREDNISolone  30 mg Intravenous Q12H    Fluticasone furoate-vilanterol  1 puff Inhalation Daily    remdesivir IVPB  100 mg Intravenous Q24H    divalproex  500 mg Oral QAM    divalproex  1,000 mg Oral Nightly    pantoprazole  40 mg Oral QAM AC    QUEtiapine  50 mg Oral Nightly    sodium chloride flush  10 mL Intravenous 2 times per day    enoxaparin  40 mg Subcutaneous Daily    famotidine  20 mg Oral BID         sodium chloride, 30 mL, PRN      albuterol sulfate HFA, 2 puff, PRN      benzonatate, 100 mg, TID PRN      sodium chloride flush, 10 mL, PRN      promethazine, 12.5 mg, Q6H PRN    Or      ondansetron, 4 mg, Q6H PRN      polyethylene glycol, 17 g, Daily PRN      acetaminophen, 650 mg, Q6H PRN    Or      acetaminophen, 650 mg, Q6H PRN         Objective:    /76   Pulse 66   Temp 97.6 °F (36.4 °C)   Resp 18   Ht 5' 9\" (1.753 m)   Wt 222 lb (100.7 kg)   SpO2 90%   BMI 32.78 kg/m²   General Appearance: alert and oriented to person, place and time and in no acute distress  Skin: warm and dry  Head: normocephalic and atraumatic  Eyes: pupils equal, round, and reactive to light, extraocular eye movements intact, conjunctivae normal  Neck: neck supple and non tender without mass   Pulmonary/Chest: clear to auscultation bilaterally- no wheezes, rales or rhonchi, normal air movement, no respiratory distress  Cardiovascular: normal rate, normal S1 and S2 and no carotid bruits  Abdomen: soft, non-tender, non-distended, normal bowel sounds, no masses or organomegaly  Extremities: no cyanosis, no clubbing and no edema  Neurologic: no cranial nerve deficit and speech normal      Recent Labs 01/17/21  1712 01/18/21  0831 01/19/21  0710    136 140   K 4.2 3.9 5.2*   CL 98 96* 97*   CO2 26 30* 32*   BUN 14 17 20   CREATININE 0.9 1.1 0.9   GLUCOSE 121* 104* 109*   CALCIUM 8.8 8.5* 9.1       Recent Labs     01/17/21  1712 01/18/21  0831 01/19/21  0710   ALKPHOS 123 107 134*   PROT 7.4 7.0 7.3   LABALBU 3.6 3.4* 3.8   BILITOT 0.3 0.4 0.4   AST 80* 52* 55*   * 104* 106*       Recent Labs     01/17/21  1712 01/18/21  0831   WBC 7.7 7.6   RBC 5.29 5.20   HGB 16.0 15.6   HCT 48.1 48.3   MCV 90.9 92.9   MCH 30.2 30.0   MCHC 33.3 32.3   RDW 13.2 13.4    240   MPV 10.0 9.9           Radiology:   CTA PULMONARY W CONTRAST   Final Result   No evidence of pulmonary embolism. Diffuse and extensive bilateral patchy infiltrates, compatible with atypical,   viral and COVID pneumonia. Assessment:  Active Problems:    Pneumonia due to COVID-19 virus    Bipolar 1 disorder (HCC)    COPD (chronic obstructive pulmonary disease) (HCC)  Resolved Problems:    * No resolved hospital problems. *      Plan:  Presented with worsening Shortness of Breath, Cough for last few days prior to arrival to the hospital. SOB is associated with some cough, nonproductive but no fever or chills reported. Initially SOB was mild, intermittent but gradually getting more persistent. Started gradually. Exacerbated by general activity or exertion. Relieved only partially by rest.       1. Pneumonia + acute Hypoxic respiratory failure+ Covid19 infection  Imaging suggestive of pneumonia  Septic workup with cultures in progress. Blood cultures will be 48 hours old overnight on 1/19. So far negative. Remdesivir. Finished deacron Sunday am  1/17. Solu-Medrol 40 IV twice daily on 1/18 decreased to 30 mg IV twice daily on 1/19  Humidified Oxygen:  1/18: 10 liters  1/19 3 liters   2. COPD, treat as if in acute exacerbation.   Continue Inhaled steroid + Albuterol PRN inhalation and PRN Duoneb  3 Bipolar Mood disorder: stable with no acute changes to home meds  4. No change to outpatient treatment regimen for the existing stable combordities including:  Cardiomyopathy ([address] Hyperlipemia. Note h/o Triangular fibrocartilage complex tear. 5. Disposition: Home/primary residence   6 Full code  7. DVT Prophylaxis : [place] Lovenox as  All questions were answered to patient's satisfaction and verbalized understanding    NOTE: This report was transcribed using voice recognition software. Every effort was made to ensure accuracy; however, inadvertent computerized transcription errors may be present.      Electronically signed by Gracie Hutchins MD on 1/19/2021 at 11:01 AM

## 2021-01-19 NOTE — CARE COORDINATION
CROW Note: 1/19/2021 at 10:12am: COVID POSITIVE - test done on 1/17/2021. CM called and spoke to the patient on the phone in his room. Currently on O2 3L NC - no home O2. Lives with his wife in a 2 story home. On IV Remdesivir. States his plan remains to return home and his wife will provide the transportation.  Davon Bland RN

## 2021-01-19 NOTE — PLAN OF CARE
Problem: Airway Clearance - Ineffective  Goal: Achieve or maintain patent airway  Outcome: Met This Shift     Problem: Gas Exchange - Impaired  Goal: Absence of hypoxia  Outcome: Met This Shift  Goal: Promote optimal lung function  Outcome: Met This Shift     Problem: Breathing Pattern - Ineffective  Goal: Ability to achieve and maintain a regular respiratory rate  Outcome: Met This Shift     Problem:  Body Temperature -  Risk of, Imbalanced  Goal: Ability to maintain a body temperature within defined limits  Outcome: Met This Shift  Goal: Will regain or maintain usual level of consciousness  Outcome: Met This Shift  Goal: Complications related to the disease process, condition or treatment will be avoided or minimized  Outcome: Met This Shift     Problem: Isolation Precautions - Risk of Spread of Infection  Goal: Prevent transmission of infection  Outcome: Met This Shift     Problem: Nutrition Deficits  Goal: Optimize nutritional status  Outcome: Met This Shift     Problem: Risk for Fluid Volume Deficit  Goal: Maintain normal heart rhythm  Outcome: Met This Shift  Goal: Maintain absence of muscle cramping  Outcome: Met This Shift  Goal: Maintain normal serum potassium, sodium, calcium, phosphorus, and pH  Outcome: Met This Shift     Problem: Loneliness or Risk for Loneliness  Goal: Demonstrate positive use of time alone when socialization is not possible  Outcome: Met This Shift     Problem: Fatigue  Goal: Verbalize increase energy and improved vitality  Outcome: Met This Shift     Problem: Patient Education: Go to Patient Education Activity  Goal: Patient/Family Education  Outcome: Met This Shift     Problem: Falls - Risk of:  Goal: Will remain free from falls  Description: Will remain free from falls  Outcome: Met This Shift  Goal: Absence of physical injury  Description: Absence of physical injury  Outcome: Met This Shift

## 2021-01-20 LAB
ALBUMIN SERPL-MCNC: 3.7 G/DL (ref 3.5–5.2)
ALP BLD-CCNC: 145 U/L (ref 40–129)
ALT SERPL-CCNC: 120 U/L (ref 0–40)
ANION GAP SERPL CALCULATED.3IONS-SCNC: 12 MMOL/L (ref 7–16)
AST SERPL-CCNC: 58 U/L (ref 0–39)
BILIRUB SERPL-MCNC: 0.4 MG/DL (ref 0–1.2)
BUN BLDV-MCNC: 21 MG/DL (ref 6–20)
CALCIUM SERPL-MCNC: 9.1 MG/DL (ref 8.6–10.2)
CHLORIDE BLD-SCNC: 98 MMOL/L (ref 98–107)
CO2: 28 MMOL/L (ref 22–29)
CREAT SERPL-MCNC: 0.9 MG/DL (ref 0.7–1.2)
GFR AFRICAN AMERICAN: >60
GFR NON-AFRICAN AMERICAN: >60 ML/MIN/1.73
GLUCOSE BLD-MCNC: 105 MG/DL (ref 74–99)
POTASSIUM SERPL-SCNC: 4.4 MMOL/L (ref 3.5–5)
SODIUM BLD-SCNC: 138 MMOL/L (ref 132–146)
TOTAL PROTEIN: 7.4 G/DL (ref 6.4–8.3)

## 2021-01-20 PROCEDURE — 80053 COMPREHEN METABOLIC PANEL: CPT

## 2021-01-20 PROCEDURE — 99232 SBSQ HOSP IP/OBS MODERATE 35: CPT | Performed by: INTERNAL MEDICINE

## 2021-01-20 PROCEDURE — 2700000000 HC OXYGEN THERAPY PER DAY

## 2021-01-20 PROCEDURE — 2060000000 HC ICU INTERMEDIATE R&B

## 2021-01-20 PROCEDURE — 6360000002 HC RX W HCPCS: Performed by: INTERNAL MEDICINE

## 2021-01-20 PROCEDURE — 2580000003 HC RX 258: Performed by: INTERNAL MEDICINE

## 2021-01-20 PROCEDURE — 6370000000 HC RX 637 (ALT 250 FOR IP): Performed by: INTERNAL MEDICINE

## 2021-01-20 PROCEDURE — 94640 AIRWAY INHALATION TREATMENT: CPT

## 2021-01-20 PROCEDURE — 36415 COLL VENOUS BLD VENIPUNCTURE: CPT

## 2021-01-20 PROCEDURE — 2500000003 HC RX 250 WO HCPCS: Performed by: INTERNAL MEDICINE

## 2021-01-20 RX ORDER — DIPHENHYDRAMINE HCL 25 MG
25 TABLET ORAL EVERY 6 HOURS PRN
Status: DISCONTINUED | OUTPATIENT
Start: 2021-01-20 | End: 2021-01-23 | Stop reason: HOSPADM

## 2021-01-20 RX ORDER — BENZONATATE 100 MG/1
100 CAPSULE ORAL 3 TIMES DAILY PRN
Status: DISCONTINUED | OUTPATIENT
Start: 2021-01-20 | End: 2021-01-23 | Stop reason: HOSPADM

## 2021-01-20 RX ADMIN — FAMOTIDINE 20 MG: 20 TABLET, FILM COATED ORAL at 08:21

## 2021-01-20 RX ADMIN — ENOXAPARIN SODIUM 40 MG: 40 INJECTION SUBCUTANEOUS at 08:20

## 2021-01-20 RX ADMIN — BENZONATATE 100 MG: 100 CAPSULE ORAL at 08:21

## 2021-01-20 RX ADMIN — METHYLPREDNISOLONE SODIUM SUCCINATE 30 MG: 40 INJECTION, POWDER, FOR SOLUTION INTRAMUSCULAR; INTRAVENOUS at 08:20

## 2021-01-20 RX ADMIN — DIPHENHYDRAMINE HCL 25 MG: 25 TABLET ORAL at 20:57

## 2021-01-20 RX ADMIN — FAMOTIDINE 20 MG: 20 TABLET, FILM COATED ORAL at 20:57

## 2021-01-20 RX ADMIN — DIVALPROEX SODIUM 1000 MG: 500 TABLET, EXTENDED RELEASE ORAL at 21:05

## 2021-01-20 RX ADMIN — REMDESIVIR 100 MG: 100 INJECTION, POWDER, LYOPHILIZED, FOR SOLUTION INTRAVENOUS at 20:55

## 2021-01-20 RX ADMIN — ACETAMINOPHEN 650 MG: 325 TABLET, FILM COATED ORAL at 16:52

## 2021-01-20 RX ADMIN — ALBUTEROL SULFATE 2 PUFF: 90 AEROSOL, METERED RESPIRATORY (INHALATION) at 08:04

## 2021-01-20 RX ADMIN — METHYLPREDNISOLONE SODIUM SUCCINATE 30 MG: 40 INJECTION, POWDER, FOR SOLUTION INTRAMUSCULAR; INTRAVENOUS at 20:57

## 2021-01-20 RX ADMIN — ACETAMINOPHEN 650 MG: 325 TABLET, FILM COATED ORAL at 08:22

## 2021-01-20 RX ADMIN — Medication 10 ML: at 08:21

## 2021-01-20 RX ADMIN — PANTOPRAZOLE SODIUM 40 MG: 40 TABLET, DELAYED RELEASE ORAL at 05:37

## 2021-01-20 RX ADMIN — BENZONATATE 100 MG: 100 CAPSULE ORAL at 16:52

## 2021-01-20 RX ADMIN — FLUTICASONE FUROATE AND VILANTEROL 1 PUFF: 200; 25 POWDER RESPIRATORY (INHALATION) at 08:26

## 2021-01-20 RX ADMIN — Medication 10 ML: at 21:00

## 2021-01-20 RX ADMIN — DIVALPROEX SODIUM 500 MG: 500 TABLET, EXTENDED RELEASE ORAL at 08:20

## 2021-01-20 RX ADMIN — QUETIAPINE FUMARATE 50 MG: 25 TABLET ORAL at 20:57

## 2021-01-20 ASSESSMENT — PAIN SCALES - GENERAL
PAINLEVEL_OUTOF10: 0

## 2021-01-20 NOTE — TELEPHONE ENCOUNTER
Patient's wife called, stating she called [employer], patient's [employer], and was informed that they had not sent paperwork to Dr. Ventura Doctor as of yet. Wife gave fax number, [phone], and was informed paperwork will be faxed today. Informed wife I will let staff know to look for forms.  Wife requested someone call once rec'd, so she knows the ofc has them.    [address]  F - [phone]  P - [phone]

## 2021-01-20 NOTE — PROGRESS NOTES
Physician Progress Note      PATIENT:               Helena Cunnnigham  CSN #:                  [id number]  :                       1965  ADMIT DATE:       2021 4:45 PM  [address] [place] DATE:  RESPONDING  PROVIDER #:        LAKIA VÁSQUEZ          QUERY TEXT:    Pt admitted with COVID-19 and noted to have Septic workup in progress   documented in H&P, PN. If possible, please document in progress notes and   discharge summary if you are evaluating and/or treating: The medical record reflects the following:  Risk Factors: Covid -19 pneumonia, Acute respiratory failure  Clinical Indicators: Wbc 4.4, LA 2.4, Ph 7.46,  Cxr Covid pneumonia,  Treatment: NS 1L bolus in ER, Decadron, Solumedrol, Remdesivir, oxygen    Thank you, Lily Sparks RN [facility] [phone]  Options provided:  -- Sepsis due to COVID-19  -- Sepsis ruled out  -- Other - I will add my own diagnosis  -- Disagree - Not applicable / Not valid  -- Disagree - Clinically unable to determine / Unknown  -- Refer to Clinical Documentation Reviewer    PROVIDER RESPONSE TEXT:    After study, sepsis is ruled out for this patient.     Query created by: Jose Eduardo Smith on 2021 10:34 AM      Electronically signed by:  LAKIA VÁSQUEZ 2021 2:30 PM

## 2021-01-20 NOTE — PROGRESS NOTES
Admitting Date and Time: 1/17/2021  4:45 PM  Admit Dx: Pneumonia due to COVID-19 virus [U07.1, J12.82]    Subjective:    Overall no significant change in sob and coughing. Still More hoarse than usual  Per RN: no issues. Patient says rn says no thrush    ROS: denies fever, chills, cp, sob, n/v, HA unless stated above.      methylPREDNISolone  30 mg Intravenous Q12H    Fluticasone furoate-vilanterol  1 puff Inhalation Daily    remdesivir IVPB  100 mg Intravenous Q24H    divalproex  500 mg Oral QAM    divalproex  1,000 mg Oral Nightly    pantoprazole  40 mg Oral QAM AC    QUEtiapine  50 mg Oral Nightly    sodium chloride flush  10 mL Intravenous 2 times per day    enoxaparin  40 mg Subcutaneous Daily    famotidine  20 mg Oral BID         sodium chloride, 30 mL, PRN      albuterol sulfate HFA, 2 puff, PRN      benzonatate, 100 mg, TID PRN      sodium chloride flush, 10 mL, PRN      promethazine, 12.5 mg, Q6H PRN    Or      ondansetron, 4 mg, Q6H PRN      polyethylene glycol, 17 g, Daily PRN      acetaminophen, 650 mg, Q6H PRN    Or      acetaminophen, 650 mg, Q6H PRN         Objective:    /68   Pulse 88   Temp 97.3 °F (36.3 °C) (Infrared)   Resp 16   Ht 5' 9\" (1.753 m)   Wt 222 lb (100.7 kg)   SpO2 92%   BMI 32.78 kg/m²   General Appearance: alert and oriented to person, place and time and in no acute distress  Skin: warm and dry  Head: normocephalic and atraumatic  Eyes: pupils equal, round, and reactive to light, extraocular eye movements intact, conjunctivae normal  Neck: neck supple and non tender without mass   Pulmonary/Chest: clear to auscultation bilaterally- no wheezes, rales or rhonchi, normal air movement, no respiratory distress  Cardiovascular: normal rate, normal S1 and S2 and no carotid bruits  Abdomen: soft, non-tender, non-distended, normal bowel sounds, no masses or organomegaly  Extremities: no cyanosis, no clubbing and no edema  Neurologic: no cranial nerve deficit and speech normal      Recent Labs     01/17/21 1712 01/18/21  0831 01/19/21  0710 01/20/21  0831    136 140 138   K 4.2 3.9 5.2* 4.4   CL 98 96* 97* 98   CO2 26 30* 32*  --    BUN 14 17 20  --    CREATININE 0.9 1.1 0.9  --    GLUCOSE 121* 104* 109*  --    CALCIUM 8.8 8.5* 9.1  --        Recent Labs     01/17/21 1712 01/18/21  0831 01/19/21  0710 01/20/21  0831   ALKPHOS 123 107 134*  --    PROT 7.4 7.0 7.3  --    LABALBU 3.6 3.4* 3.8 3.7   BILITOT 0.3 0.4 0.4  --    AST 80* 52* 55*  --    * 104* 106*  --        Recent Labs     01/17/21 1712 01/18/21  0831   WBC 7.7 7.6   RBC 5.29 5.20   HGB 16.0 15.6   HCT 48.1 48.3   MCV 90.9 92.9   MCH 30.2 30.0   MCHC 33.3 32.3   RDW 13.2 13.4    240   MPV 10.0 9.9           Radiology:   CTA PULMONARY W CONTRAST   Final Result   No evidence of pulmonary embolism. Diffuse and extensive bilateral patchy infiltrates, compatible with atypical,   viral and COVID pneumonia. Assessment:  Active Problems:    Pneumonia due to COVID-19 virus    Bipolar 1 disorder (HCC)    COPD (chronic obstructive pulmonary disease) ([facility])  Resolved Problems:    * No resolved hospital problems. *      Plan:  Presented with worsening Shortness of Breath, Cough for last few days prior to arrival to the hospital. SOB is associated with some cough, nonproductive but no fever or chills reported. Initially SOB was mild, intermittent but gradually getting more persistent. Started gradually. Exacerbated by general activity or exertion. Relieved only partially by rest.       1. Pneumonia + acute Hypoxic respiratory failure+ Covid19 infection. Although there are some criteria of sepsis due to these diagnosises, he is not clinically septic. Imaging suggestive of pneumonia  Septic workup with cultures in progress. Blood cultures will be 48 hours old overnight on 1/19. So far negative. Remdesivir 1st course. Finished deacron Sunday am  1/17.  It was started from ED visit, then had flu clinic followup. Solu-Medrol 40 IV twice daily on 1/18 decreased to 30 mg IV twice daily on 1/19  Humidified Oxygen:  1/18: 10 liters  1/19 3 liters  1/20: 2 liters     2. COPD, treat as if in acute exacerbation. Continue Inhaled steroid + Albuterol PRN inhalation and PRN Duoneb  3 Bipolar Mood disorder: stable with no acute changes to home meds  4. No change to outpatient treatment regimen for the existing stable combordities including:  Cardiomyopathy ([address] Hyperlipemia. Note h/o Triangular fibrocartilage complex tear. 5. Disposition: Home/primary residence   6 Full code  7. DVT Prophylaxis : [place] Lovenox as  All questions were answered to patient's satisfaction and verbalized understanding    NOTE: This report was transcribed using voice recognition software. Every effort was made to ensure accuracy; however, inadvertent computerized transcription errors may be present.      Electronically signed by Stephane Jerome MD on 1/20/2021 at 10:07 AM

## 2021-01-21 LAB
ALBUMIN SERPL-MCNC: 3.5 G/DL (ref 3.5–5.2)
ALP BLD-CCNC: 138 U/L (ref 40–129)
ALT SERPL-CCNC: 94 U/L (ref 0–40)
ANION GAP SERPL CALCULATED.3IONS-SCNC: 9 MMOL/L (ref 7–16)
AST SERPL-CCNC: 34 U/L (ref 0–39)
BILIRUB SERPL-MCNC: 0.3 MG/DL (ref 0–1.2)
BUN BLDV-MCNC: 21 MG/DL (ref 6–20)
CALCIUM SERPL-MCNC: 9.1 MG/DL (ref 8.6–10.2)
CHLORIDE BLD-SCNC: 98 MMOL/L (ref 98–107)
CO2: 32 MMOL/L (ref 22–29)
CREAT SERPL-MCNC: 0.9 MG/DL (ref 0.7–1.2)
GFR AFRICAN AMERICAN: >60
GFR NON-AFRICAN AMERICAN: >60 ML/MIN/1.73
GLUCOSE BLD-MCNC: 90 MG/DL (ref 74–99)
POTASSIUM SERPL-SCNC: 5.6 MMOL/L (ref 3.5–5)
SODIUM BLD-SCNC: 139 MMOL/L (ref 132–146)
TOTAL PROTEIN: 7.1 G/DL (ref 6.4–8.3)

## 2021-01-21 PROCEDURE — 6360000002 HC RX W HCPCS: Performed by: INTERNAL MEDICINE

## 2021-01-21 PROCEDURE — 2580000003 HC RX 258: Performed by: INTERNAL MEDICINE

## 2021-01-21 PROCEDURE — 94640 AIRWAY INHALATION TREATMENT: CPT

## 2021-01-21 PROCEDURE — 2060000000 HC ICU INTERMEDIATE R&B

## 2021-01-21 PROCEDURE — 36415 COLL VENOUS BLD VENIPUNCTURE: CPT

## 2021-01-21 PROCEDURE — 80053 COMPREHEN METABOLIC PANEL: CPT

## 2021-01-21 PROCEDURE — 99232 SBSQ HOSP IP/OBS MODERATE 35: CPT | Performed by: INTERNAL MEDICINE

## 2021-01-21 PROCEDURE — 2500000003 HC RX 250 WO HCPCS: Performed by: INTERNAL MEDICINE

## 2021-01-21 PROCEDURE — 6370000000 HC RX 637 (ALT 250 FOR IP): Performed by: INTERNAL MEDICINE

## 2021-01-21 PROCEDURE — 2700000000 HC OXYGEN THERAPY PER DAY

## 2021-01-21 RX ORDER — METHYLPREDNISOLONE SODIUM SUCCINATE 40 MG/ML
20 INJECTION, POWDER, LYOPHILIZED, FOR SOLUTION INTRAMUSCULAR; INTRAVENOUS EVERY 12 HOURS
Status: DISCONTINUED | OUTPATIENT
Start: 2021-01-21 | End: 2021-01-23 | Stop reason: HOSPADM

## 2021-01-21 RX ADMIN — QUETIAPINE FUMARATE 50 MG: 25 TABLET ORAL at 20:52

## 2021-01-21 RX ADMIN — ENOXAPARIN SODIUM 40 MG: 40 INJECTION SUBCUTANEOUS at 08:37

## 2021-01-21 RX ADMIN — REMDESIVIR 100 MG: 100 INJECTION, POWDER, LYOPHILIZED, FOR SOLUTION INTRAVENOUS at 20:51

## 2021-01-21 RX ADMIN — ACETAMINOPHEN 650 MG: 325 TABLET, FILM COATED ORAL at 00:18

## 2021-01-21 RX ADMIN — Medication 10 ML: at 08:37

## 2021-01-21 RX ADMIN — BENZONATATE 100 MG: 100 CAPSULE ORAL at 08:38

## 2021-01-21 RX ADMIN — FAMOTIDINE 20 MG: 20 TABLET, FILM COATED ORAL at 08:37

## 2021-01-21 RX ADMIN — DIPHENHYDRAMINE HCL 25 MG: 25 TABLET ORAL at 20:52

## 2021-01-21 RX ADMIN — METHYLPREDNISOLONE SODIUM SUCCINATE 30 MG: 40 INJECTION, POWDER, FOR SOLUTION INTRAMUSCULAR; INTRAVENOUS at 08:37

## 2021-01-21 RX ADMIN — BENZONATATE 100 MG: 100 CAPSULE ORAL at 20:52

## 2021-01-21 RX ADMIN — ALBUTEROL SULFATE 2 PUFF: 90 AEROSOL, METERED RESPIRATORY (INHALATION) at 16:06

## 2021-01-21 RX ADMIN — PANTOPRAZOLE SODIUM 40 MG: 40 TABLET, DELAYED RELEASE ORAL at 05:24

## 2021-01-21 RX ADMIN — METHYLPREDNISOLONE SODIUM SUCCINATE 20 MG: 40 INJECTION, POWDER, FOR SOLUTION INTRAMUSCULAR; INTRAVENOUS at 20:51

## 2021-01-21 RX ADMIN — DIVALPROEX SODIUM 500 MG: 500 TABLET, EXTENDED RELEASE ORAL at 08:37

## 2021-01-21 RX ADMIN — ALBUTEROL SULFATE 2 PUFF: 90 AEROSOL, METERED RESPIRATORY (INHALATION) at 04:59

## 2021-01-21 RX ADMIN — DIVALPROEX SODIUM 1000 MG: 500 TABLET, EXTENDED RELEASE ORAL at 20:50

## 2021-01-21 RX ADMIN — ACETAMINOPHEN 650 MG: 325 TABLET, FILM COATED ORAL at 08:37

## 2021-01-21 RX ADMIN — FAMOTIDINE 20 MG: 20 TABLET, FILM COATED ORAL at 20:51

## 2021-01-21 RX ADMIN — Medication 10 ML: at 21:58

## 2021-01-21 RX ADMIN — ALBUTEROL SULFATE 2 PUFF: 90 AEROSOL, METERED RESPIRATORY (INHALATION) at 10:55

## 2021-01-21 ASSESSMENT — PAIN SCALES - GENERAL
PAINLEVEL_OUTOF10: 4
PAINLEVEL_OUTOF10: 0
PAINLEVEL_OUTOF10: 4
PAINLEVEL_OUTOF10: 0
PAINLEVEL_OUTOF10: 0

## 2021-01-21 NOTE — PROGRESS NOTES
Admitting Date and Time: 1/17/2021  4:45 PM  Admit Dx: Pneumonia due to COVID-19 virus [U07.1, J12.82]    Subjective:    Leiva when goes to bathroom without o2. He stat for a while several minutes afterward without o2 and noted that his pulse ox was 92  5 at that time  Still More hoarse than usual  Per RN: no issues. Patient says rn says no thrush    ROS: denies fever, chills, cp, sob, n/v, HA unless stated above.      remdesivir IVPB  100 mg Intravenous Q24H    methylPREDNISolone  30 mg Intravenous Q12H    Fluticasone furoate-vilanterol  1 puff Inhalation Daily    divalproex  500 mg Oral QAM    divalproex  1,000 mg Oral Nightly    pantoprazole  40 mg Oral QAM AC    QUEtiapine  50 mg Oral Nightly    sodium chloride flush  10 mL Intravenous 2 times per day    enoxaparin  40 mg Subcutaneous Daily    famotidine  20 mg Oral BID         benzonatate, 100 mg, TID PRN      diphenhydrAMINE, 25 mg, Q6H PRN      sodium chloride, 30 mL, PRN      albuterol sulfate HFA, 2 puff, PRN      sodium chloride flush, 10 mL, PRN      promethazine, 12.5 mg, Q6H PRN    Or      ondansetron, 4 mg, Q6H PRN      polyethylene glycol, 17 g, Daily PRN      acetaminophen, 650 mg, Q6H PRN    Or      acetaminophen, 650 mg, Q6H PRN         Objective:    /78   Pulse 89   Temp 97.6 °F (36.4 °C) (Infrared)   Resp 18   Ht 5' 9\" (1.753 m)   Wt 222 lb (100.7 kg)   SpO2 93%   BMI 32.78 kg/m²   General Appearance: alert and oriented to person, place and time and in no acute distress  Skin: warm and dry  Head: normocephalic and atraumatic  Eyes: pupils equal, round, and reactive to light, extraocular eye movements intact, conjunctivae normal  Neck: neck supple and non tender without mass   Pulmonary/Chest: clear to auscultation bilaterally- no wheezes, rales or rhonchi, normal air movement, no respiratory distress  Cardiovascular: normal rate, normal S1 and S2 and no carotid bruits  Abdomen: soft, non-tender, non-distended, normal bowel sounds, no masses or organomegaly  Extremities: no cyanosis, no clubbing and no edema  Neurologic: no cranial nerve deficit and speech normal      Recent Labs     01/19/21  0710 01/20/21  0831 01/21/21  0707    138 139   K 5.2* 4.4 5.6*   CL 97* 98 98   CO2 32* 28 32*   BUN 20 21* 21*   CREATININE 0.9 0.9 0.9   GLUCOSE 109* 105* 90   CALCIUM 9.1 9.1 9.1       Recent Labs     01/19/21  0710 01/20/21  0831 01/21/21  0707   ALKPHOS 134* 145* 138*   PROT 7.3 7.4 7.1   LABALBU 3.8 3.7 3.5   BILITOT 0.4 0.4 0.3   AST 55* 58* 34   * 120* 94*       No results for input(s): WBC, RBC, HGB, HCT, MCV, MCH, MCHC, RDW, PLT, MPV in the last 72 hours. Radiology:   CTA PULMONARY W CONTRAST   Final Result   No evidence of pulmonary embolism. Diffuse and extensive bilateral patchy infiltrates, compatible with atypical,   viral and COVID pneumonia. Assessment:  Active Problems:    Pneumonia due to COVID-19 virus    Bipolar 1 disorder (HCC)    COPD (chronic obstructive pulmonary disease) ([facility])  Resolved Problems:    * No resolved hospital problems. *      Plan:  Presented with worsening Shortness of Breath, Cough for last few days prior to arrival to the hospital. SOB is associated with some cough, nonproductive but no fever or chills reported. Initially SOB was mild, intermittent but gradually getting more persistent. Started gradually. Exacerbated by general activity or exertion. Relieved only partially by rest.       1. Pneumonia + acute Hypoxic respiratory failure+ Covid19 infection. Although there are some criteria of sepsis due to these diagnosises, he is not clinically septic. Imaging suggestive of pneumonia  Septic workup with cultures in progress. Blood cultures will be 48 hours old overnight on 1/19. So far negative. Remdesivir 1st course. Finished deacron Sunday am  1/17. It was started from ED visit, then had flu clinic followup.   Solu-Medrol 40 IV twice daily on 1/18 decreased to 30 mg IV twice daily on 1/19, then 20 mg bid on 1/21  Humidified Oxygen:  1/18: 10 liters  1/19 3 liters  1/20-21: 2 liters     2. COPD, treat as if in acute exacerbation. Continue Inhaled steroid + Albuterol PRN inhalation and PRN Duoneb  3 Bipolar Mood disorder: stable with no acute changes to home meds  4. No change to outpatient treatment regimen for the existing stable combordities including:  Cardiomyopathy ([address] Hyperlipemia. Note h/o Triangular fibrocartilage complex tear. 5. Disposition: Home/primary residence   6 Full code  7. DVT Prophylaxis : [place] Lovenox as  All questions were answered to patient's satisfaction and verbalized understanding    NOTE: This report was transcribed using voice recognition software. Every effort was made to ensure accuracy; however, inadvertent computerized transcription errors may be present.      Electronically signed by Jewel Courtney MD on 1/21/2021 at 10:25 AM

## 2021-01-21 NOTE — TELEPHONE ENCOUNTER
Called and spoke to Patient's wife. I did get paperwork from [facility]. I will fill them out and have Dr Sophia Garcia sign and fax to correct fax number. I also advised wife that I will call when done.  Electronically signed by Marie Moctezuma MA on 1/21/21 at 12:12 PM EST

## 2021-01-22 ENCOUNTER — TELEPHONE (OUTPATIENT)
Dept: ADMINISTRATIVE | Age: 56
End: 2021-01-22

## 2021-01-22 LAB — BLOOD CULTURE, ROUTINE: NORMAL

## 2021-01-22 PROCEDURE — 6370000000 HC RX 637 (ALT 250 FOR IP): Performed by: INTERNAL MEDICINE

## 2021-01-22 PROCEDURE — 94640 AIRWAY INHALATION TREATMENT: CPT

## 2021-01-22 PROCEDURE — 2060000000 HC ICU INTERMEDIATE R&B

## 2021-01-22 PROCEDURE — 99232 SBSQ HOSP IP/OBS MODERATE 35: CPT | Performed by: INTERNAL MEDICINE

## 2021-01-22 PROCEDURE — 2580000003 HC RX 258: Performed by: INTERNAL MEDICINE

## 2021-01-22 PROCEDURE — 6360000002 HC RX W HCPCS: Performed by: INTERNAL MEDICINE

## 2021-01-22 RX ADMIN — DIVALPROEX SODIUM 500 MG: 500 TABLET, EXTENDED RELEASE ORAL at 10:11

## 2021-01-22 RX ADMIN — FAMOTIDINE 20 MG: 20 TABLET, FILM COATED ORAL at 20:59

## 2021-01-22 RX ADMIN — METHYLPREDNISOLONE SODIUM SUCCINATE 20 MG: 40 INJECTION, POWDER, FOR SOLUTION INTRAMUSCULAR; INTRAVENOUS at 20:57

## 2021-01-22 RX ADMIN — QUETIAPINE FUMARATE 50 MG: 25 TABLET ORAL at 20:59

## 2021-01-22 RX ADMIN — Medication 10 ML: at 20:59

## 2021-01-22 RX ADMIN — ACETAMINOPHEN 650 MG: 325 TABLET, FILM COATED ORAL at 19:20

## 2021-01-22 RX ADMIN — FAMOTIDINE 20 MG: 20 TABLET, FILM COATED ORAL at 10:11

## 2021-01-22 RX ADMIN — METHYLPREDNISOLONE SODIUM SUCCINATE 20 MG: 40 INJECTION, POWDER, FOR SOLUTION INTRAMUSCULAR; INTRAVENOUS at 10:14

## 2021-01-22 RX ADMIN — PROMETHAZINE HYDROCHLORIDE 12.5 MG: 25 TABLET ORAL at 20:58

## 2021-01-22 RX ADMIN — BENZONATATE 100 MG: 100 CAPSULE ORAL at 20:58

## 2021-01-22 RX ADMIN — ENOXAPARIN SODIUM 40 MG: 40 INJECTION SUBCUTANEOUS at 10:15

## 2021-01-22 RX ADMIN — ALBUTEROL SULFATE 2 PUFF: 90 AEROSOL, METERED RESPIRATORY (INHALATION) at 08:22

## 2021-01-22 RX ADMIN — FLUTICASONE FUROATE AND VILANTEROL 1 PUFF: 200; 25 POWDER RESPIRATORY (INHALATION) at 08:10

## 2021-01-22 RX ADMIN — Medication 10 ML: at 10:15

## 2021-01-22 RX ADMIN — DIVALPROEX SODIUM 1000 MG: 500 TABLET, EXTENDED RELEASE ORAL at 20:58

## 2021-01-22 RX ADMIN — PANTOPRAZOLE SODIUM 40 MG: 40 TABLET, DELAYED RELEASE ORAL at 06:11

## 2021-01-22 ASSESSMENT — PAIN SCALES - GENERAL
PAINLEVEL_OUTOF10: 1
PAINLEVEL_OUTOF10: 0

## 2021-01-22 ASSESSMENT — PAIN DESCRIPTION - PAIN TYPE: TYPE: ACUTE PAIN

## 2021-01-22 ASSESSMENT — PAIN DESCRIPTION - ORIENTATION: ORIENTATION: MID

## 2021-01-22 ASSESSMENT — PAIN DESCRIPTION - PROGRESSION: CLINICAL_PROGRESSION: RESOLVED

## 2021-01-22 ASSESSMENT — PAIN DESCRIPTION - ONSET: ONSET: GRADUAL

## 2021-01-22 NOTE — FLOWSHEET NOTE
Pulse ox was 92% on room air at rest. Ambulated patient on room air. Oxygen saturation was 85% on room air while ambulating. Oxygen applied. Recovery pulse ox was 93-94% on 2L of oxygen while ambulating.

## 2021-01-22 NOTE — TELEPHONE ENCOUNTER
Emili called back. Concerned the office visit is not in person. I explained that the visit is vv because Tanesha Lindsey is sick. She said he doesn't have COVID he is just sick.   I explained the site is a green zone and that his next office visit could be in person based off what the Dr suggest.

## 2021-01-22 NOTE — FLOWSHEET NOTE
Pt educated on importance of using incentive spirometer 10x per hour and proning as much as possible. Pt demonstrates understanding.

## 2021-01-22 NOTE — TELEPHONE ENCOUNTER
Pt's wife states patient is to discharge from hospital tomorrow, been there since 1/17 with COVID. He's scheduled a VV for Monday afternoon but she's adamant he needs to be seen to discuss his steroid intake. Please contact her and advise. Thank you in advance.

## 2021-01-22 NOTE — CARE COORDINATION
CM Note: 1/22/2021 at 2:09pm: COVID POSITIVE - test done on 1/17/2021. Await ambulatory pulse ox testing as ordered by Dr. Candelario Nino to see if patient does qualify for home oxygen. Mr. Mikie Patterson has selected [employer] (Phone: [phone] / Fax: [phone]) if he needs home O2. For home O2, patient will need a documented qualifying pulse ox 24 to 48 hours prior to discharge, along with orders with O2 liter flow for both portability and concentrator, duration use , physician's NPI number, the date the order was written and the dg that the O2 is being ordered for. Carlos Lantigua - liason for [employer] would need called (Phone: [phone]). If patient does qualify for home O2, will need a tank brought to the hospital before discharge.  Jann Alvarado RN

## 2021-01-23 VITALS
DIASTOLIC BLOOD PRESSURE: 69 MMHG | SYSTOLIC BLOOD PRESSURE: 101 MMHG | HEIGHT: 69 IN | RESPIRATION RATE: 16 BRPM | OXYGEN SATURATION: 92 % | TEMPERATURE: 97 F | WEIGHT: 222 LBS | BODY MASS INDEX: 32.88 KG/M2 | HEART RATE: 56 BPM

## 2021-01-23 LAB — CULTURE, BLOOD 2: NORMAL

## 2021-01-23 PROCEDURE — 2580000003 HC RX 258: Performed by: INTERNAL MEDICINE

## 2021-01-23 PROCEDURE — 99239 HOSP IP/OBS DSCHRG MGMT >30: CPT | Performed by: INTERNAL MEDICINE

## 2021-01-23 PROCEDURE — 6360000002 HC RX W HCPCS: Performed by: INTERNAL MEDICINE

## 2021-01-23 PROCEDURE — 94640 AIRWAY INHALATION TREATMENT: CPT

## 2021-01-23 PROCEDURE — 6370000000 HC RX 637 (ALT 250 FOR IP): Performed by: INTERNAL MEDICINE

## 2021-01-23 RX ORDER — PREDNISONE 20 MG/1
40 TABLET ORAL DAILY
Qty: 12 TABLET | Refills: 0 | Status: SHIPPED | OUTPATIENT
Start: 2021-01-23 | End: 2021-02-02

## 2021-01-23 RX ORDER — BENZONATATE 100 MG/1
100 CAPSULE ORAL 3 TIMES DAILY PRN
Qty: 30 CAPSULE | Refills: 0 | Status: SHIPPED | OUTPATIENT
Start: 2021-01-23 | End: 2021-01-30

## 2021-01-23 RX ADMIN — Medication 10 ML: at 08:52

## 2021-01-23 RX ADMIN — METHYLPREDNISOLONE SODIUM SUCCINATE 20 MG: 40 INJECTION, POWDER, FOR SOLUTION INTRAMUSCULAR; INTRAVENOUS at 08:52

## 2021-01-23 RX ADMIN — ENOXAPARIN SODIUM 40 MG: 40 INJECTION SUBCUTANEOUS at 08:52

## 2021-01-23 RX ADMIN — BENZONATATE 100 MG: 100 CAPSULE ORAL at 08:52

## 2021-01-23 RX ADMIN — FAMOTIDINE 20 MG: 20 TABLET, FILM COATED ORAL at 08:52

## 2021-01-23 RX ADMIN — PANTOPRAZOLE SODIUM 40 MG: 40 TABLET, DELAYED RELEASE ORAL at 05:29

## 2021-01-23 RX ADMIN — FLUTICASONE FUROATE AND VILANTEROL 1 PUFF: 200; 25 POWDER RESPIRATORY (INHALATION) at 06:32

## 2021-01-23 RX ADMIN — DIVALPROEX SODIUM 500 MG: 500 TABLET, EXTENDED RELEASE ORAL at 08:52

## 2021-01-23 RX ADMIN — ALBUTEROL SULFATE 2 PUFF: 90 AEROSOL, METERED RESPIRATORY (INHALATION) at 06:30

## 2021-01-23 NOTE — CARE COORDINATION
SS Note: COVID POSITIVE 1/17/21. Pt being discharged today, SW called and faxed referral to [facility],  to bring portable tank to pt's room today.   Electronically signed by ARIAS Moyer on 1/23/2021 at 12:33 PM

## 2021-01-23 NOTE — DISCHARGE SUMMARY
[facility] Physician Discharge Summary             See your PCP within 1 week of discharge      Activity level: Slowly increase as tolerated    Diet: DIET GENERAL;    Labs: None are pending at the discharge    Condition at discharge: Stable    Dispo: Return to home setting     Patient ID:  Faustina Ferris  [id number]  61 y.o.  1965    Admit date: 1/17/2021    Discharge date and time:  1/23/2021  10:57 AM    Admission Diagnoses: Principal Problem:    Pneumonia due to COVID-19 virus  Active Problems:    Bipolar 1 disorder ([facility] [address]    COPD (chronic obstructive pulmonary disease) ([facility]  Resolved Problems:    * No resolved hospital problems. *      Discharge Diagnoses: Principal Problem:    Pneumonia due to COVID-19 virus  Active Problems:    Bipolar 1 disorder ([facility] [address]    COPD (chronic obstructive pulmonary disease) ([facility]  Resolved Problems:    * No resolved hospital problems. *      Consults:  IP CONSULT TO PHARMACY    Procedures: None significant except if described in hospital course. Hospital Course:     Presented with worsening Shortness of Breath, Cough for last few days prior to arrival to the hospital. SOB is associated with some cough, nonproductive but no fever or chills reported.  Initially SOB was mild, intermittent but gradually getting more persistent. Started gradually. Exacerbated by general activity or exertion. Relieved only partially by rest.      Acute Hypoxic respiratory failure DUE TO BOTH RECENT COVID 19 INFECTION AND ACUTE EXACERBATION OF asthma / copd. Although there are some criteria of sepsis due to these diagnosises, he is not clinically septic. Imaging suggestive of pneumonia  Septic workup with cultures in progress. Blood cultures negative. Remdesivir 1st course. Finished deacron Sunday am  1/17. It was started from ED visit, then had flu clinic followup.   Solu-Medrol 40 IV twice daily on 1/18 decreased to 30 mg IV twice daily on 1/19, then 20 mg bid on 1/21. Home on oral prednisone. pcp can  alter course of taper if needed    Humidified Oxygen weaned: 1/18: 10 liters  1/19 3 liters; 1/20-21: 2 liters;  1/21: room air when at rest, navarro with activity 1/22 qualified for home o2   Continue  Albuterol PRN  Instructed to f/u with new pfts. Last one was 4 years ago. Bipolar Mood disorder: stable with no acute changes to home meds    No change to outpatient treatment regimen for the existing stable combordities including:  Cardiomyopathy ([address] Hyperlipemia. Note h/o Triangular fibrocartilage complex tear. All questions were answered to patient's satisfaction and verbalized understanding       Discharge Exam:  Vitals:    01/22/21 1920 01/23/21 0035 01/23/21 0335 01/23/21 0800   BP: 126/87 136/74 134/70 101/69   Pulse: 70 68 62 56   Resp: 16 16 18 16   Temp: 97.2 °F (36.2 °C) 97.5 °F (36.4 °C) 97.4 °F (36.3 °C) 97 °F (36.1 °C)   TempSrc: Infrared Axillary Axillary    SpO2: 92% 90% 93% 92%   Weight:       Height:           No acute distress moist membranes   Normocephalic, without obvious abnormality, atraumatic, external ears without lesions,   Neck supple no cervical lymphadenopathy  Heart has a regular rate and rhythm no murmur  Lungs are clear to auscultation bilaterally with equal movements. Abdomen is soft nontender nondistended no rebound or guarding. No significant peripheral edema good peripheral perfusion. No significant rashes or new skin lesions. No new focality on neuro exam which is overall grossly intact. Affect and mood seem to be appropriate   Hoarse    I/O last 3 completed shifts: In: 2050 [P.O.:2040; I.V.:10]  Out: -   I/O this shift: In: 480 [P.O.:480]  Out: -       LABS:  Recent Labs     01/21/21  0707      K 5.6*   CL 98   CO2 32*   BUN 21*   CREATININE 0.9   GLUCOSE 90   CALCIUM 9.1       No results for input(s): WBC, RBC, HGB, HCT, MCV, MCH, MCHC, RDW, PLT, MPV in the last 72 hours.     No results for input(s): POCGLU in the last 72 hours. Imaging:  Cta Pulmonary W Contrast    Result Date: 1/17/2021  EXAMINATION: CTA OF THE CHEST 1/17/2021 7:19 pm TECHNIQUE: CTA of the chest was performed after the administration of intravenous contrast.  Multiplanar reformatted images are provided for review. MIP images are provided for review. Dose modulation, iterative reconstruction, and/or weight based adjustment of the mA/kV was utilized to reduce the radiation dose to as low as reasonably achievable. COMPARISON: None. HISTORY: ORDERING SYSTEM PROVIDED HISTORY: Worsening SOB, chest pain, Covid + TECHNOLOGIST PROVIDED HISTORY: Reason for exam:->Worsening SOB, chest pain, Covid + FINDINGS: Pulmonary Arteries: Pulmonary arteries are adequately opacified for evaluation. No evidence of intraluminal filling defect to suggest pulmonary embolism. Main pulmonary artery is normal in caliber. Mediastinum: No evidence of mediastinal lymphadenopathy. The heart and pericardium demonstrate no acute abnormality. There is no acute abnormality of the thoracic aorta. Lungs/pleura: There is extensive and diffuse bilateral patchy infiltrates with relative sparing of the lung apices compared to rest of the lungs. No evidence of pleural effusion or pneumothorax. Upper Abdomen: Limited images of the upper abdomen are unremarkable. Soft Tissues/Bones: No acute bone or soft tissue abnormality. No evidence of pulmonary embolism. Diffuse and extensive bilateral patchy infiltrates, compatible with atypical, viral and COVID pneumonia.         Patient Instructions:   Current Discharge Medication List      START taking these medications    Details   predniSONE (DELTASONE) 20 MG tablet Take 2 tablets by mouth daily for 10 days 40 mg po qd x 4 days, then 20 mg x 4 days  Qty: 12 tablet, Refills: 0         CONTINUE these medications which have CHANGED    Details   benzonatate (TESSALON PERLES) 100 MG capsule Take 1 capsule by mouth 3 times daily as needed for Cough  Qty: 30 capsule, Refills: 0    Associated Diagnoses: [id number]         CONTINUE these medications which have NOT CHANGED    Details   QUEtiapine (SEROQUEL) 100 MG tablet Take 50 mg by mouth nightly      !! divalproex (DEPAKOTE ER) 500 MG extended release tablet Take 500 mg by mouth every morning      !! divalproex (DEPAKOTE ER) 500 MG extended release tablet Take 1,000 mg by mouth nightly      ipratropium-albuterol (DUONEB) 0.5-2.5 (3) MG/3ML SOLN nebulizer solution Take 3 mLs by nebulization every 6 hours  Qty: 120 vial, Refills: 0    Associated Diagnoses: [id number]; Moderate persistent asthma with exacerbation      albuterol sulfate  (90 Base) MCG/ACT inhaler Inhale 2 puffs into the lungs as needed for Wheezing  Qty: 1 Inhaler, Refills: 0    Associated Diagnoses: [id number]; Moderate persistent asthma with exacerbation      Fluticasone furoate-vilanterol (BREO ELLIPTA) 200-25 MCG/INH AEPB inhaler Inhale 1 puff into the lungs daily  Qty: 3 each, Refills: 0      esomeprazole (NEXIUM) 40 MG delayed release capsule Take 1 capsule by mouth every morning (before breakfast)  Qty: 90 capsule, Refills: 1      ondansetron (ZOFRAN-ODT) 8 MG TBDP disintegrating tablet Take 0.5 tablets by mouth every 8 hours as needed for Nausea or Vomiting  Qty: 12 tablet, Refills: 0      tadalafil (CIALIS) 10 MG tablet TAKE 1 TABLET BY MOUTH AS NEEDED AN HOUR BEFORE SEX. DO NOT TAKE MORE THAN 1 TIME IN 24 HOURS. THIS IS NOT A DAILY MEDICINE. Refills: 1      fluticasone (VERAMYST) 27.5 MCG/SPRAY nasal spray 2 sprays by Nasal route daily as needed for Rhinitis        ! ! - Potential duplicate medications found. Please discuss with provider.       STOP taking these medications       Promethazine-DM (PROMETHAZINE-DEXTROMETHORPHAN) 6.25-15 MG/5ML SOLN solution Comments:   Reason for Stopping:         dexamethasone (DECADRON) 6 MG tablet Comments:   Reason for Stopping:                 Note that more than 30 minutes was spent in preparing discharge papers, discussing discharge with patient, medication review, etc.    NOTE: This report was transcribed using voice recognition software. Every effort was made to ensure accuracy; however, inadvertent computerized transcription errors may be present.      Signed:  Electronically signed by Natalia Cooper MD on 1/23/2021 at 10:57 AM

## 2021-01-23 NOTE — PROGRESS NOTES
Nutrition Assessment     Type and Reason for Visit: Initial, RD Nutrition Re-Screen/LOS    Nutrition Recommendations/Plan: Continue Current diet as ordered. Nutrition Assessment:  COVID-19+. H/o COPD. Pt w/ no GI complaints, consuming % of meals. Pt doing well from nutritional standpoint. Will continue to monitor    Malnutrition Assessment:  Malnutrition Status: No malnutrition    Estimated Daily Nutrient Needs:  Energy (kcal): 2365-3707(MSJ 1822 x 1.2 SF); Weight Used for Energy Requirements:  Current     Protein (g): (1.2-1.4 g/kg IBW); Weight Used for Protein Requirements:  Ideal        Fluid (ml/day): 1388-2222; Weight Used for Fluid Requirements:  1 ml/kcal      Nutrition Related Findings: A&O x4, abd WDL, hyperkalemia, LFT elevated, no edema, +I/O (6L), general diet      Current Nutrition Therapies:    DIET GENERAL;     Anthropometric Measures:  · Height: 5' 9\" (175.3 cm)  · Current Body Wt: 222 lb (100.7 kg)(1/17 stated wt)   · BMI: 32.8    Nutrition Diagnosis:   No nutrition diagnosis at this time     Nutrition Interventions:   Nutrition Education/Counseling:  No recommendation at this time   Coordination of Nutrition Care:  Continue to monitor while inpatient    Goals:  Consume >75% of meals       Nutrition Monitoring and Evaluation:   Behavioral-Environmental Outcomes:  None Identified   Food/Nutrient Intake Outcomes:  Food and Nutrient Intake  Physical Signs/Symptoms Outcomes:  Nutrition Focused Physical Findings, Biochemical Data, Fluid Status or Edema, Skin, Weight     Discharge Planning:    No discharge needs at this time     Electronically signed by Monika Smith MS, RD, LD on 1/23/21 at 11:11 AM EST    Contact:

## 2021-01-23 NOTE — PROGRESS NOTES
Pts wife, Forestrosmery Beard, called with updates. All questions answered. Pts wife would like to ensure pt gets discharged with something for his cough, and to clarify what steroid he will be discharged on. Will continue to monitor.

## 2021-01-25 ENCOUNTER — VIRTUAL VISIT (OUTPATIENT)
Dept: FAMILY MEDICINE CLINIC | Age: 56
End: 2021-01-25
Payer: COMMERCIAL

## 2021-01-25 ENCOUNTER — CARE COORDINATION (OUTPATIENT)
Dept: CASE MANAGEMENT | Age: 56
End: 2021-01-25

## 2021-01-25 DIAGNOSIS — R49.0 HOARSENESS OF VOICE: ICD-10-CM

## 2021-01-25 DIAGNOSIS — U07.1 COVID-19 VIRUS INFECTION: Primary | ICD-10-CM

## 2021-01-25 DIAGNOSIS — E78.49 OTHER HYPERLIPIDEMIA: ICD-10-CM

## 2021-01-25 DIAGNOSIS — J45.41 MODERATE PERSISTENT ASTHMA WITH EXACERBATION: ICD-10-CM

## 2021-01-25 DIAGNOSIS — U07.1 PNEUMONIA DUE TO COVID-19 VIRUS: Primary | ICD-10-CM

## 2021-01-25 DIAGNOSIS — J12.82 PNEUMONIA DUE TO COVID-19 VIRUS: Primary | ICD-10-CM

## 2021-01-25 PROCEDURE — 99213 OFFICE O/P EST LOW 20 MIN: CPT | Performed by: INTERNAL MEDICINE

## 2021-01-25 ASSESSMENT — ENCOUNTER SYMPTOMS
ABDOMINAL PAIN: 0
EYE DISCHARGE: 0
BLOOD IN STOOL: 0
NAUSEA: 0
SHORTNESS OF BREATH: 0
VOICE CHANGE: 1
COUGH: 1

## 2021-01-25 NOTE — PROGRESS NOTES
Respiratory: Positive for cough. Negative for shortness of breath (No new SOB). Cardiovascular: Negative for chest pain and leg swelling. Gastrointestinal: Negative for abdominal pain, blood in stool and nausea. Endocrine: Negative for polydipsia. Genitourinary: Negative for flank pain and hematuria. Musculoskeletal: Negative for myalgias and neck pain. Skin: Negative for rash. Neurological: Negative for dizziness and seizures. Hematological: Does not bruise/bleed easily. Psychiatric/Behavioral: Negative for hallucinations and suicidal ideas. There were no vitals filed for this visit. Physical Exam  Vitals reviewed: Virtual visit. Denies Fever,    Constitutional:       Appearance: Normal appearance. He is not ill-appearing or diaphoretic. HENT:      Head: Normocephalic and atraumatic. Nose: Nose normal.   Eyes:      General:         Right eye: No discharge. Left eye: No discharge. Conjunctiva/sclera: Conjunctivae normal.   Pulmonary:      Effort: Pulmonary effort is normal. No respiratory distress. Skin:     General: Skin is dry. Coloration: Skin is not jaundiced. Neurological:      General: No focal deficit present. Mental Status: He is alert and oriented to person, place, and time. Mental status is at baseline. Psychiatric:         Mood and Affect: Mood normal.         Behavior: Behavior normal.         Thought Content:  Thought content normal.          Labs :    Lab Results   Component Value Date    WBC 7.6 01/18/2021    HGB 15.6 01/18/2021    HCT 48.3 01/18/2021     01/18/2021    CHOL 236 (H) 01/02/2017    TRIG 202 (H) 01/02/2017    HDL 58 01/18/2020    ALT 94 (H) 01/21/2021    AST 34 01/21/2021     01/21/2021    K 5.6 (H) 01/21/2021    CL 98 01/21/2021    CREATININE 0.9 01/21/2021    BUN 21 (H) 01/21/2021    CO2 32 (H) 01/21/2021    TSH 3.030 01/18/2020    PSA 0.48 02/08/2018    GLUF 85 01/18/2020     Lab Results Component Value Date    COLORU Yellow 02/04/2019    NITRU Negative 02/04/2019    GLUCOSEU Negative 02/04/2019    KETUA TRACE 02/04/2019    UROBILINOGEN 0.2 02/04/2019    BILIRUBINUR Negative 02/04/2019     Lab Results   Component Value Date    PSA 0.48 02/08/2018             ASSESSMENT     Patient Active Problem List    Diagnosis Date Noted    COPD (chronic obstructive pulmonary disease) ([facility] [address] 01/18/2021    Bipolar 1 disorder ([facility] [address]     Pneumonia due to COVID-19 virus 01/17/2021    Moderate persistent asthma with exacerbation 11/18/2019    History of total left hip replacement - 10/2/19 11/18/2019    Spondylarthrosis     Chronic pain syndrome 10/12/2018    Cervical disc disorder 10/12/2018    Cervical facet joint syndrome 10/12/2018    Spondylosis of cervical region without myelopathy or radiculopathy 10/12/2018    Neck pain 05/21/2018    Hyperlipemia 03/12/2018    Asthma 03/12/2018    Arthritis 03/12/2018    SELENA (obstructive sleep apnea) 03/12/2018    Wrist pain 12/15/2014    TFCC (triangular fibrocartilage complex) tear 12/15/2014    Elbow contusion 02/04/2014    Triceps tendon rupture 02/04/2014    Elbow pain 02/04/2014    Cubital tunnel syndrome 02/04/2014        Diagnosis:     ICD-10-CM    1. COVID-19 virus infection - Finished his isolation and inpatient treatment   U07.1    2. Other hyperlipidemia  E78.49    3. Moderate persistent asthma with exacerbation  J45.41    4. Hoarseness of voice  R49.0        PLAN:      Clinically improving  On tapering dose Prednisone    Has appt with ENT Dr Armando Gaytan     Pt is stable on current medical treatment. Continue current treatment plan    Side effects/Adverse effects/Precautions are reviewed with patient. Pt is stable on current medical treatment. Continue current treatment plan    Side effects/Adverse effects/Precautions are reviewed with patient.      Low salt, Low Carb diet an low fat diet

## 2021-01-25 NOTE — CARE COORDINATION
Dave [facility] Transitions Initial Follow Up Call    Call within 2 business days of discharge: Yes    Patient: Angelina Wood II Patient : 1965   MRN: [id number]  Reason for Admission: PNA d/t COVID-19 virus  Discharge Date: 21 RARS: Readmission Risk Score: 15      Last Discharge [facility]       Complaint Diagnosis Description Type Department Provider    21 Shortness of Breath Acute hypoxemic respiratory failure due to COVID-19 ([facility] [address] . .. ED to Hosp-Admission (Discharged) (ADMITTED) Kathy Cartagena MD; Angelita Toribio. .. Challenges to be reviewed by the provider   Additional needs identified to be addressed with provider No  none    Discussed COVID-19 related testing which was available at this time. Test results were positive. Patient informed of results, if available? Yes         Method of communication with provider : none    Advance Care Planning:   Does patient have an Advance Directive:  decision maker updated. Was this a readmission? No  Patient stated reason for admission: short of breath/COVID  Patients top risk factors for readmission: medical condition and polypharmacy    Care Transition Nurse (CTN) contacted the patient by telephone to perform post hospital discharge assessment. Verified name and  with patient as identifiers. Provided introduction to self, and explanation of the CTN role. CTN reviewed discharge instructions, medical action plan and red flags with patient who verbalized understanding. Patient given an opportunity to ask questions and does not have any further questions or concerns at this time. Were discharge instructions available to patient? Yes. Reviewed appropriate site of care based on symptoms and resources available to patient including: PCP, Urgent care clinics, When to call 911 and Condition related references. The patient agrees to contact the PCP office for questions related to their healthcare.      Medication reconciliation was performed with patient, who verbalizes understanding of administration of home medications. Advised obtaining a 90-day supply of all daily and as-needed medications. Covid Risk Education    Patient has following risk factors of: COPD, asthma and pneumonia. Education provided regarding infection prevention, and signs and symptoms of COVID-19 and when to seek medical attention with patient who verbalized understanding. Discussed exposure protocols and quarantine From CDC: Are you at higher risk for severe illness?   and given an opportunity for questions and concerns. The patient agrees to contact the COVID-19 hotline [phone] or PCP office for questions related to COVID-19. For more information on steps you can take to protect yourself, see CDC's How to Protect Yourself     Patient/family/caregiver given information for [employer] Loop and agrees to enroll yes  Patient's preferred e-mail: [email or web address]  Patient's preferred phone number: [phone]    Discussed follow-up appointments. If no appointment was previously scheduled, appointment scheduling offered: No. Is follow up appointment scheduled within 7 days of discharge? Yes, CTN confirmed with patient he is scheudled for a vv follow up today at 3 pm with Dr. Alexis Shannon (PCP). Non-face-to-face services provided:  Scheduled appointment with PCP-CTN confirmed with patient he is scheduled for a vv follow up to at 3 pm with Dr. Alexis Shannon (PCP)  Obtained and reviewed discharge summary and/or continuity of care documents  Education of patient/family/caregiver/guardian to support self-management-Discussed COPD/PNA zone tools/COVID precautions and knowing when to seek medical attention. Assessment and support for treatment adherence and medication management-Advised to take Prednisone as directed until completely finished.    Establishment or re-establishment of referrals-Confirmed with patient he obtained home oxygen from [facility] [employer] and is wearing 2 lpm as needed. Care Transitions 24 Hour Call    Do you have any ongoing symptoms?: Yes  Patient-reported symptoms: Other, Cough, Shortness of Breath (Comment: burning with deep breaths and loss of taste)  Do you have a copy of your discharge instructions?: Yes  Do you have all of your prescriptions and are they filled?: Yes  Have you been contacted by a [address]?: No  Have you scheduled your follow up appointment?: Yes  How are you going to get to your appointment?: Other (Comment: vv)  Were you discharged with any Home Care or Post Acute Services: No  Do you feel like you have everything you need to keep you well at home?: Yes  Care Transitions Interventions  No Identified Needs       Spoke with patient today 1/25/21 for hospital discharge/COVID-19+ follow up. Patient states he's happy to be home and doing better. Complains of shortness of breath with exertion but is wearing home oxygen 2 lpm as needed. States current PAO2 is 93% on room air. States using incentive spirometer at home. States having an associated non-productive cough and \"burning\" in chest with deep breaths. Denies any abdominal pain, nausea, vomiting, diarrhea, chills or fever. States having loss of taste but not smell. Confirmed with patient he obtained Prednisone ordered on discharge. Advised to take Prednisone as directed until completely finished which patient verbalizes understanding. Discussed PNA/COPD zone tools and COVID precautions knowing when to seek medical attention. Confirmed with patient he is scheduled for vv follow up today at 3 pm with Dr. Emily Beaver (PCP). Denies any other complaints at this time. Patient enrolled into Loop Program for continued monitoring. Follow Up  Future Appointments   Date Time Provider Connie Fernandez   1/25/2021  3:00 PM Pawan Padilla MD [facility]     CTN provided contact information for future needs.     Taunya Dubin, APRN

## 2021-01-26 ENCOUNTER — CARE COORDINATION (OUTPATIENT)
Dept: CARE COORDINATION | Age: 56
End: 2021-01-26

## 2021-01-26 NOTE — CARE COORDINATION
Yellow alert noted in Loop remote symptom monitoring program. Messaged patient to notify Megan Ribeiro if symptoms have worsened since yesterday. Deion Alejandre MSN, RN, RN, 7:25 AM  Thank you for checking in with us. Please let us know if your symptoms are worse than they were yesterday or if you wish to speak to a nurse. We are available 8am - 4pm M-F, and 9am - 1pm S/S. As a reminder, call [facility] to get your oxygen concentrator set up. Also for your pulse ox:  Heres how you use the Pulse-Oximeter: -Place the Pulse-Oximeter on your index finger (remove any nail polish if present). -Direct the red light downwards towards your finger, on top of your fingernail. -Hold your finger still while the machine reads your blood oxygen level. -Make sure your finger is warm  If you notice that your blood oxygen level stays below 88% while being active OR stays below 90% while sitting or standing, PLEASE RETURN IMMEDIATELY TO THE EMERGENCY DEPARTMENT.  If you normally require home Oxygen (even before you got COVID-19) and you notice that you need more than 4 liters of Oxygen to keep your oxygen level above 88%, PLEASE RETURN IMMEDIATELY TO THE EMERGENCY DEPARTMENT.  If your shortness of breath is severe or getting worse, no matter what your oxygen level states, PLEASE RETURN IMMEDIATELY TO THE EMERGENCY DEPARTMENT.   If you have chest pain, fainting episodes, heart palpitations, or any other serious medical issue, PLEASE RETURN IMMEDIATELY TO THE EMERGENCY DEPARTMENT. Karina Fonseca RN

## 2021-01-27 NOTE — ADT AUTH CERT
Patient Demographics    Name Patient ID SSN Gender Identity Birth Date   Mark Griffith [id number]  Male 63 (61 yrs)   Address Phone Email Employer    [address] [place] [address] [place] [phone] (D)   [phone] (K)   [phone] (W) [email or web address] Race Occupation Emp Status    [employer] -- Full Time    Reg Status PCP Date Last Verified Next Review Date    Verified Cherry Schmid [address] [place] [address]  [phone] 21    Admission Date Discharge Date Admitting Provider     21 Theresa Barth, DO     Marital Status Bahai       Tenriism      Emergency Contact [address] (2)   [address] [place] [address]   [phone] (N)   [phone] (P)   [facility] [de-identified]  [address] [place] [address] Name/Sex/Relation Subscriber  Subscriber Address/Phone Subscriber Emp/Emp Phone   1.  [facility]   [id number] Joselin Mendoza - Male   (Self) 1965 [address] [place] [address]  [address] [phone](H)   [phone](Z) OTHER    Utilization Reviews       Viral Illness, Acute - Care Day 6 (2021) by David Marvin RN       Review Status Review Entered   Completed 2021 10:42      Criteria Review      Care Day: 6 Care Date: 2021 Level of Care: Intermediate Care    Guideline Day 3    Clinical Status    (X) * Hemodynamic stability    2021 10:42 AM EST by Macel Grade      108/58   18  97.2   68    (X) * Afebrile or temperature acceptable for next level of care    2021 10:42 AM EST by Macel Grade      97.2    (X) * Tachypnea absent    2021 10:42 AM EST by Macel Grade      absent    (X) * Hypoxemia absent    2021 10:42 AM EST by Macel Grade      92 %  ra    (X) * Mental status at baseline    2021 10:42 AM EST by Macel Grade      a/o x 4    (X) * Renal function at baseline, or stable and acceptable for next level of care 1/27/2021 10:42 AM EST by Jeremías Campbell      stable    ( ) * Discharge plans and education understood    Activity    (X) * Ambulatory or acceptable for next level of care    1/27/2021 10:42 AM EST by Jeremías Campbell      accept    Routes    (X) * Oral hydration [L]    1/27/2021 10:42 AM EST by Jeremías Campbell      yes    (X) * Oral medications or regimen acceptable for next level of care    1/27/2021 10:42 AM EST by Jeremías Campbell      yes    (X) * Oral diet or acceptable for next level of care    1/27/2021 10:42 AM EST by Jeremías Campbell      yes    Interventions    ( ) * Isolation not indicated, or is performable at next level of care [G] [H]    (X) Pulse oximetry    1/27/2021 10:42 AM EST by Jeremías Campbell      92 % ra    Medications    (X) * Antimicrobial medication absent or regimen established for next level of care    1/27/2021 10:42 AM EST by Jeremías Campbell      absent    * Milestone   Additional Notes   1-22-21      Droplet plus iso    Still Leiva but improved       108/58   18  97.2   68  92 %  ra       No new labs      Lovenox 40 mg sc qd, iv solumedrol 20 mg q 12, breo qd , albuterol PRN x 1 , po phenergan 12.5 mg x 1          Per phy- General Appearance: alert and oriented to person, place and time and in no acute distress   Skin: warm and dry   Head: normocephalic and atraumatic   Eyes: pupils equal, round, and reactive to light, extraocular eye movements intact, conjunctivae normal   Neck: neck supple and non tender without mass    Pulmonary/Chest: clear to auscultation bilaterally- no wheezes, rales or rhonchi, normal air movement, no respiratory distress   Cardiovascular: normal rate, normal S1 and S2 and no carotid bruits   Abdomen: soft, non-tender, non-distended, normal bowel sounds, no masses or organomegaly   Extremities: no cyanosis, no clubbing and no edema   Neurologic: no cranial nerve deficit and speech normal   Assessment:   Active Problems:     Pneumonia due to COVID-19 virus 1/27/2021 10:39 AM EST by Bianca Bees      intermed    Clinical Status    (X) * Hypotension absent    1/27/2021 10:39 AM EST by Bianca Bees      114/78  97.6  52  80    (X) * No requirement for mechanical ventilation    1/27/2021 10:39 AM EST by Bianca Bees      93 %  ra    (X) * Oxygenation at baseline or improved    1/27/2021 10:39 AM EST by Bianca Bees      93 %  ra    (X) * Mental status at baseline    1/27/2021 10:39 AM EST by Bianca Bees      a/o x 4    Routes    (X) * Oral hydration    1/27/2021 10:39 AM EST by Bianca Bees      yes    (X) Oral or IV medications    1/27/2021 10:39 AM EST by Bianca Bees      yes    (X) Usual diet    1/27/2021 10:39 AM EST by Bianca Bees      yes    Interventions    (X) Possible isolation    1/27/2021 10:39 AM EST by Bianca Bees      droplet plus iso    (X) Pulse oximetry    1/27/2021 10:39 AM EST by Bianca Bees      93 %  ra    Medications    (X) Possible antiviral medication    1/27/2021 10:39 AM EST by Bianca Bees      iv remdesivir 100 mg qd    * Milestone   Additional Notes   1-21-21      Droplet plus iso       Leiva when goes to bathroom without o2.           114/78  97.6  18  89  93 %  ra       K-5.6, co2-32, bun-21      Lovenox 40 mg sc qd, iv solumedrol 20 mg q 12 , iv solumedrol 30 mg x 1 , iv remdesivir 100 mg qd , albuterol PRN x 3          Per phy- General Appearance: alert and oriented to person, place and time and in no acute distress   Skin: warm and dry   Head: normocephalic and atraumatic   Eyes: pupils equal, round, and reactive to light, extraocular eye movements intact, conjunctivae normal   Neck: neck supple and non tender without mass    Pulmonary/Chest: clear to auscultation bilaterally- no wheezes, rales or rhonchi, normal air movement, no respiratory distress   Cardiovascular: normal rate, normal S1 and S2 and no carotid bruits   Abdomen: soft, non-tender, non-distended, normal bowel sounds, no masses or organomegaly   Extremities: no cyanosis, no clubbing and no edema   Neurologic: no cranial nerve deficit and speech normal       Assessment:   Active Problems:     Pneumonia due to COVID-19 virus     Bipolar 1 disorder (HCC)     COPD (chronic obstructive pulmonary disease) (HCC)   Resolved Problems:     * No resolved hospital problems. *           Plan:   Presented with worsening Shortness of Breath, Cough for last few days prior to arrival to the hospital. SOB is associated with some cough, nonproductive but no fever or chills reported.  Initially SOB was mild, intermittent but gradually getting more persistent. Started gradually. Exacerbated by general activity or exertion. Relieved only partially by rest.           1. Pneumonia + acute Hypoxic respiratory failure+ Covid19 infection. Although there are some criteria of sepsis due to these diagnosises, he is not clinically septic.     Imaging suggestive of pneumonia   Septic workup with cultures in progress.  Blood cultures will be 48 hours old overnight on 1/19. So far negative. Remdesivir 1st course. Finished deacron Sunday am  1/17. It was started from ED visit, then had flu clinic followup.  Solu-Medrol 40 IV twice daily on 1/18 decreased to 30 mg IV twice daily on 1/19, then 20 mg bid on 1/21   Humidified Oxygen:   1/18: 10 liters   1/19 3 liters   1/20-21: 2 liters        2. COPD, treat as if in acute exacerbation. Continue Inhaled steroid + Albuterol PRN inhalation and PRN Duoneb   3 Bipolar Mood disorder: stable with no acute changes to home meds   4. No change to outpatient treatment regimen for the existing stable combordities including:  Cardiomyopathy ([address] Hyperlipemia.  Note h/o Triangular fibrocartilage complex tear. 5. Disposition: Home/primary residence    6 Full code   7.  DVT Prophylaxis : SC Lovenox as

## 2021-02-10 ENCOUNTER — TELEPHONE (OUTPATIENT)
Dept: FAMILY MEDICINE CLINIC | Age: 56
End: 2021-02-10

## 2021-02-10 NOTE — TELEPHONE ENCOUNTER
Patient called asking if Dr. Mariangel Ortiz would write a letter clearing him to return to work. Patient stated he's been off for a month due to COVID-19 hospitalization and is now symptom free, other than being a little tired. Patient stated he would like to return to work this Friday, 2/12/21, and can work without any restrictions. Patient stated he feels great. Letter can be emailed to  [email or web address]    Last seen 1/25/2021  Next appt 2/25/2021

## 2021-02-25 ENCOUNTER — OFFICE VISIT (OUTPATIENT)
Dept: FAMILY MEDICINE CLINIC | Age: 56
End: 2021-02-25
Payer: COMMERCIAL

## 2021-02-25 VITALS
HEART RATE: 75 BPM | OXYGEN SATURATION: 98 % | HEIGHT: 69 IN | DIASTOLIC BLOOD PRESSURE: 65 MMHG | RESPIRATION RATE: 18 BRPM | BODY MASS INDEX: 34.07 KG/M2 | WEIGHT: 230 LBS | SYSTOLIC BLOOD PRESSURE: 110 MMHG | TEMPERATURE: 97 F

## 2021-02-25 DIAGNOSIS — K21.9 GASTROESOPHAGEAL REFLUX DISEASE, UNSPECIFIED WHETHER ESOPHAGITIS PRESENT: ICD-10-CM

## 2021-02-25 DIAGNOSIS — J45.41 MODERATE PERSISTENT ASTHMA WITH EXACERBATION: ICD-10-CM

## 2021-02-25 DIAGNOSIS — E78.49 OTHER HYPERLIPIDEMIA: Primary | ICD-10-CM

## 2021-02-25 DIAGNOSIS — R09.82 POST-NASAL DRIP: ICD-10-CM

## 2021-02-25 PROCEDURE — 99214 OFFICE O/P EST MOD 30 MIN: CPT | Performed by: INTERNAL MEDICINE

## 2021-02-25 RX ORDER — ESOMEPRAZOLE MAGNESIUM 40 MG/1
40 CAPSULE, DELAYED RELEASE ORAL
Qty: 90 CAPSULE | Refills: 1 | Status: SHIPPED | OUTPATIENT
Start: 2021-02-25

## 2021-02-25 SDOH — ECONOMIC STABILITY: FOOD INSECURITY: WITHIN THE PAST 12 MONTHS, THE FOOD YOU BOUGHT JUST DIDN'T LAST AND YOU DIDN'T HAVE MONEY TO GET MORE.: NEVER TRUE

## 2021-02-25 SDOH — ECONOMIC STABILITY: TRANSPORTATION INSECURITY
IN THE PAST 12 MONTHS, HAS LACK OF TRANSPORTATION KEPT YOU FROM MEETINGS, WORK, OR FROM GETTING THINGS NEEDED FOR DAILY LIVING?: NO

## 2021-02-25 SDOH — ECONOMIC STABILITY: TRANSPORTATION INSECURITY
IN THE PAST 12 MONTHS, HAS THE LACK OF TRANSPORTATION KEPT YOU FROM MEDICAL APPOINTMENTS OR FROM GETTING MEDICATIONS?: NO

## 2021-02-25 SDOH — ECONOMIC STABILITY: INCOME INSECURITY: HOW HARD IS IT FOR YOU TO PAY FOR THE VERY BASICS LIKE FOOD, HOUSING, MEDICAL CARE, AND HEATING?: NOT HARD AT ALL

## 2021-02-25 ASSESSMENT — ENCOUNTER SYMPTOMS
NAUSEA: 0
EYE DISCHARGE: 0
ABDOMINAL PAIN: 0
SHORTNESS OF BREATH: 0
BLOOD IN STOOL: 0

## 2021-02-25 NOTE — PROGRESS NOTES
Palpations: Abdomen is soft. Musculoskeletal: Normal range of motion. Lymphadenopathy:      Cervical: No cervical adenopathy. Skin:     General: Skin is warm and dry. Neurological:      Mental Status: He is alert and oriented to person, place, and time.    Psychiatric:         Behavior: Behavior normal.          Labs :    Lab Results   Component Value Date    WBC 7.6 01/18/2021    HGB 15.6 01/18/2021    HCT 48.3 01/18/2021     01/18/2021    CHOL 236 (H) 01/02/2017    TRIG 202 (H) 01/02/2017    HDL 58 01/18/2020    ALT 94 (H) 01/21/2021    AST 34 01/21/2021     01/21/2021    K 5.6 (H) 01/21/2021    CL 98 01/21/2021    CREATININE 0.9 01/21/2021    BUN 21 (H) 01/21/2021    CO2 32 (H) 01/21/2021    TSH 3.030 01/18/2020    PSA 0.48 02/08/2018    GLUF 85 01/18/2020     Lab Results   Component Value Date    COLORU Yellow 02/04/2019    NITRU Negative 02/04/2019    GLUCOSEU Negative 02/04/2019    KETUA TRACE 02/04/2019    UROBILINOGEN 0.2 02/04/2019    BILIRUBINUR Negative 02/04/2019     Lab Results   Component Value Date    PSA 0.48 02/08/2018             ASSESSMENT     Patient Active Problem List    Diagnosis Date Noted    COPD (chronic obstructive [facility] disease) ([facility] 01/18/2021    Bipolar 1 disorder ([facility]     Pneumonia due to COVID-19 virus 01/17/2021    Moderate persistent asthma with exacerbation 11/18/2019    History of total left hip replacement - 10/2/19 11/18/2019    Spondylarthrosis     Chronic pain syndrome 10/12/2018    Cervical disc disorder 10/12/2018    Cervical facet joint syndrome 10/12/2018    Spondylosis of cervical region without myelopathy or radiculopathy 10/12/2018    Neck pain 05/21/2018    Hyperlipemia 03/12/2018    Asthma 03/12/2018    Arthritis 03/12/2018    SELENA (obstructive sleep apnea) 03/12/2018    Wrist pain 12/15/2014    TFCC (triangular fibrocartilage complex) tear 12/15/2014    Elbow contusion 02/04/2014    Triceps tendon rupture 02/04/2014    Elbow pain 02/04/2014    Cubital tunnel syndrome 02/04/2014        Diagnosis:     ICD-10-CM    1. Other hyperlipidemia  E78.49    2. Moderate persistent asthma with exacerbation  J45.41    3. Post-nasal drip  R09.82    4. Gastroesophageal reflux disease, unspecified whether esophagitis present  K21.9        PLAN:        Adv for Anti Reflux measure    Nexium - on empty stomach    Bed riser    Avoid late night eating    For PND    Flonase or similar   Zyrtec   Sinus rinse    Pt is stable on current medical treatment. Continue current treatment plan    Side effects/Adverse effects/Precautions are reviewed with patient. Low salt, Low Carb diet an low fat diet  Continue medications as advised and take them regularly  Regular exercises as advised    Discussed natural and expected course of this diagnosis and need to alert me if symptoms do not follow expected course, or if any worse. Smoking cessation if applicable, discussed with patient. Patient Instructions   The medication list included in this document is our record of what you are currently taking, including any changes that were made at today's visit.  If you find any differences when compared to your medications at home, or have any questions that were not answered at your visit, please contact the office. Return in about 2 months (around 4/25/2021).

## 2021-03-29 NOTE — TELEPHONE ENCOUNTER
Vm msg left requesting refills.     Last seen 2/25/2021  Next appt 4/29/2021  [facility] ([address])

## 2021-05-13 ENCOUNTER — OFFICE VISIT (OUTPATIENT)
Dept: FAMILY MEDICINE CLINIC | Age: 56
End: 2021-05-13
Payer: COMMERCIAL

## 2021-05-13 VITALS
BODY MASS INDEX: 34.88 KG/M2 | DIASTOLIC BLOOD PRESSURE: 80 MMHG | SYSTOLIC BLOOD PRESSURE: 110 MMHG | OXYGEN SATURATION: 96 % | RESPIRATION RATE: 18 BRPM | WEIGHT: 235.5 LBS | TEMPERATURE: 96.4 F | HEIGHT: 69 IN | HEART RATE: 64 BPM

## 2021-05-13 DIAGNOSIS — F31.9 BIPOLAR 1 DISORDER (HCC): ICD-10-CM

## 2021-05-13 DIAGNOSIS — M72.2 PLANTAR FASCIITIS: ICD-10-CM

## 2021-05-13 DIAGNOSIS — Z12.11 COLON CANCER SCREENING: ICD-10-CM

## 2021-05-13 DIAGNOSIS — J45.41 MODERATE PERSISTENT ASTHMA WITH EXACERBATION: ICD-10-CM

## 2021-05-13 DIAGNOSIS — Z12.5 SCREENING PSA (PROSTATE SPECIFIC ANTIGEN): ICD-10-CM

## 2021-05-13 DIAGNOSIS — K21.9 GASTROESOPHAGEAL REFLUX DISEASE, UNSPECIFIED WHETHER ESOPHAGITIS PRESENT: ICD-10-CM

## 2021-05-13 DIAGNOSIS — E78.49 OTHER HYPERLIPIDEMIA: Primary | ICD-10-CM

## 2021-05-13 PROCEDURE — 99214 OFFICE O/P EST MOD 30 MIN: CPT | Performed by: INTERNAL MEDICINE

## 2021-05-13 RX ORDER — DEXTROMETHORPHAN HYDROBROMIDE AND PROMETHAZINE HYDROCHLORIDE 15; 6.25 MG/5ML; MG/5ML
SYRUP ORAL
COMMUNITY
Start: 2021-02-22 | End: 2022-02-14

## 2021-05-13 ASSESSMENT — ENCOUNTER SYMPTOMS
EYE DISCHARGE: 0
BLOOD IN STOOL: 0
NAUSEA: 0
ABDOMINAL PAIN: 0
SHORTNESS OF BREATH: 0

## 2021-05-13 NOTE — PROGRESS NOTES
Chief Complaint   Patient presents with    Other     wants to discuss getting lab work ordered       HPI:  Patient is here for follow-up   Feeling okay     C/o increase in planter fascitis         Allergy and Medications are reviewed and updated. Past Medical History, Surgical History, and Family History has been reviewed and updated. Review of Systems:  Review of Systems   Constitutional: Negative for chills and fever. HENT: Negative for congestion and ear pain. Eyes: Negative for discharge. Respiratory: Negative for shortness of breath (No new SOB). Cardiovascular: Negative for chest pain and leg swelling. Gastrointestinal: Negative for abdominal pain, blood in stool and nausea. Endocrine: Negative for polydipsia. Genitourinary: Negative for flank pain and hematuria. Musculoskeletal: Negative for myalgias and neck pain. Skin: Negative for rash. Neurological: Negative for dizziness and seizures. Hematological: Does not bruise/bleed easily. Psychiatric/Behavioral: Negative for hallucinations and suicidal ideas. Vitals:    05/13/21 1426   BP: 110/80   Pulse: 64   Resp: 18   Temp: 96.4 °F (35.8 °C)   TempSrc: Infrared   SpO2: 96%   Weight: 235 lb 8 oz (106.8 kg)   Height: 5' 9\" (1.753 m)       Physical Exam  Vitals signs reviewed. Constitutional:       Appearance: He is well-developed. HENT:      Head: Normocephalic and atraumatic. Eyes:      Conjunctiva/sclera: Conjunctivae normal.      Pupils: Pupils are equal, round, and reactive to light. Neck:      Vascular: No JVD. Cardiovascular:      Rate and Rhythm: Normal rate and regular rhythm. Pulmonary:      Effort: Pulmonary effort is normal.      Breath sounds: Normal breath sounds. No rales. Abdominal:      General: Bowel sounds are normal.      Palpations: Abdomen is soft. Musculoskeletal: Normal range of motion. Lymphadenopathy:      Cervical: No cervical adenopathy. Skin:     General: Skin is warm and dry. Neurological:      Mental Status: He is alert and oriented to person, place, and time. Psychiatric:         Behavior: Behavior normal.          Labs :    Lab Results   Component Value Date    WBC 7.6 01/18/2021    HGB 15.6 01/18/2021    HCT 48.3 01/18/2021     01/18/2021    CHOL 236 (H) 01/02/2017    TRIG 202 (H) 01/02/2017    HDL 58 01/18/2020    ALT 94 (H) 01/21/2021    AST 34 01/21/2021     01/21/2021    K 5.6 (H) 01/21/2021    CL 98 01/21/2021    CREATININE 0.9 01/21/2021    BUN 21 (H) 01/21/2021    CO2 32 (H) 01/21/2021    TSH 3.030 01/18/2020    PSA 0.48 02/08/2018    GLUF 85 01/18/2020     Lab Results   Component Value Date    COLORU Yellow 02/04/2019    NITRU Negative 02/04/2019    GLUCOSEU Negative 02/04/2019    KETUA TRACE 02/04/2019    UROBILINOGEN 0.2 02/04/2019    BILIRUBINUR Negative 02/04/2019     Lab Results   Component Value Date    PSA 0.48 02/08/2018             ASSESSMENT     Patient Active Problem List    Diagnosis Date Noted    COPD (chronic obstructive pulmonary disease) ([facility] 01/18/2021    Bipolar 1 disorder ([facility]     Pneumonia due to COVID-19 virus 01/17/2021    Moderate persistent asthma with exacerbation 11/18/2019    History of total left hip replacement - 10/2/19 11/18/2019    Spondylarthrosis     Chronic pain syndrome 10/12/2018    Cervical disc disorder 10/12/2018    Cervical facet joint syndrome 10/12/2018    Spondylosis of cervical region without myelopathy or radiculopathy 10/12/2018    Neck pain 05/21/2018    Hyperlipemia 03/12/2018    Asthma 03/12/2018    Arthritis 03/12/2018    SELENA (obstructive sleep apnea) 03/12/2018    Wrist pain 12/15/2014    TFCC (triangular fibrocartilage complex) tear 12/15/2014    Elbow contusion 02/04/2014    Triceps tendon rupture 02/04/2014    Elbow pain 02/04/2014    Cubital tunnel syndrome 02/04/2014        Diagnosis:     ICD-10-CM    1.  Other hyperlipidemia  E78.49 Comprehensive Metabolic Panel, Fasting

## 2021-05-21 DIAGNOSIS — M79.672 LEFT FOOT PAIN: Primary | ICD-10-CM

## 2021-05-21 DIAGNOSIS — M79.671 RIGHT FOOT PAIN: ICD-10-CM

## 2021-05-25 ENCOUNTER — OFFICE VISIT (OUTPATIENT)
Dept: PODIATRY | Age: 56
End: 2021-05-25
Payer: COMMERCIAL

## 2021-05-25 VITALS — WEIGHT: 231 LBS | BODY MASS INDEX: 34.21 KG/M2 | HEIGHT: 69 IN

## 2021-05-25 DIAGNOSIS — R26.2 DIFFICULTY WALKING: ICD-10-CM

## 2021-05-25 DIAGNOSIS — M72.2 PLANTAR FASCIITIS: Primary | ICD-10-CM

## 2021-05-25 DIAGNOSIS — R60.0 LOCALIZED EDEMA: ICD-10-CM

## 2021-05-25 DIAGNOSIS — M79.672 PAIN IN BOTH FEET: ICD-10-CM

## 2021-05-25 DIAGNOSIS — M79.671 PAIN IN BOTH FEET: ICD-10-CM

## 2021-05-25 PROCEDURE — 99243 OFF/OP CNSLTJ NEW/EST LOW 30: CPT | Performed by: PODIATRIST

## 2021-05-25 PROCEDURE — 29580 STRAPPING UNNA BOOT: CPT | Performed by: PODIATRIST

## 2021-05-26 PROBLEM — M79.672 PAIN IN BOTH FEET: Status: ACTIVE | Noted: 2021-05-26

## 2021-05-26 PROBLEM — M72.2 PLANTAR FASCIITIS: Status: ACTIVE | Noted: 2021-05-26

## 2021-05-26 PROBLEM — M79.671 PAIN IN BOTH FEET: Status: ACTIVE | Noted: 2021-05-26

## 2021-05-26 PROBLEM — R26.2 DIFFICULTY WALKING: Status: ACTIVE | Noted: 2021-05-26

## 2021-05-26 PROBLEM — R60.0 LOCALIZED EDEMA: Status: ACTIVE | Noted: 2021-05-26

## 2021-05-26 NOTE — PROGRESS NOTES
21     [address] [place] [address]    : 1965 Sex: male   Age: 61 y.o. Patient was referred by: Zane Mcintyre. Stephen Reagan MD  Patient's PCP/Provider is:  Naheed Montana MD    Subjective:    Patient seen today follow-up regarding bilateral foot pain left greater than right. Chief Complaint   Patient presents with    Foot Pain     bilateral        HPI: Patient is on his feet all day at work on hard concrete surfaces walking about 5 to 6 miles per day. Patient has had issues into both plantar heel and arch regions, again left foot more symptomatic than the right. Patient denies any recent injuries or changes in activities. Patient has not tried any OTC treatments to this point in time. He denies any additional issues at this time. ROS:  Const: Positives and pertinent negatives as per HPI. Musculo: Denies symptoms other than stated above. Neuro: Denies symptoms other than stated above. Skin: Denies symptoms other than stated above.     Current Medications:    Current Outpatient Medications:     promethazine-dextromethorphan (PROMETHAZINE-DM) 6.25-15 MG/5ML syrup, take 5 milliliters (1 teaspoonful) by mouth every 4 hours, Disp: , Rfl:     Fluticasone furoate-vilanterol (BREO ELLIPTA) 200-25 MCG/INH AEPB inhaler, Inhale 1 puff into the lungs daily, Disp: 3 each, Rfl: 0    esomeprazole (NEXIUM) 40 MG delayed release capsule, Take 1 capsule by mouth every morning (before breakfast), Disp: 90 capsule, Rfl: 1    QUEtiapine (SEROQUEL) 100 MG tablet, Take 50 mg by mouth nightly, Disp: , Rfl:     divalproex (DEPAKOTE ER) 500 MG extended release tablet, Take 500 mg by mouth every morning, Disp: , Rfl:     divalproex (DEPAKOTE ER) 500 MG extended release tablet, Take 1,000 mg by mouth nightly, Disp: , Rfl:     albuterol sulfate  (90 Base) MCG/ACT inhaler, Inhale 2 puffs into the lungs as needed for Wheezing, Disp: 1 Inhaler, Rfl: 0    tadalafil (CIALIS) 10 MG tablet, TAKE 1 TABLET BY MOUTH AS NEEDED AN HOUR BEFORE SEX. DO NOT TAKE MORE THAN 1 TIME IN 24 HOURS. THIS IS NOT A DAILY MEDICINE., Disp: , Rfl: 1    fluticasone (VERAMYST) 27.5 MCG/SPRAY nasal spray, 2 sprays by Nasal route daily as needed for Rhinitis , Disp: , Rfl:     ipratropium-albuterol (DUONEB) 0.5-2.5 (3) MG/3ML SOLN nebulizer solution, Take 3 mLs by nebulization every 6 hours, Disp: 120 vial, Rfl: 0    Allergies:  No Known Allergies    Vitals:    21 1152   Weight: 231 lb (104.8 kg)   Height: 5' 9\" (1.753 m)        Past Medical History:   Diagnosis Date    Asthma     [facility] 1 disorder [facility]     Cardiomyopathy ([facility]     COPD (chronic obstructive pulmonary disease) ([facility] 2021    Depression     Hyperlipemia     Pneumonia     TFCC (triangular fibrocartilage complex) tear 12/15/2014     No family history on file.   Past Surgical History:   Procedure Laterality Date    ANESTHESIA NERVE BLOCK Left 2019    LEFT C3,4,5 MEDIAL BRANCH BLOCK performed by Geovanny Blue MD at [address] [place] [address] Left     pinkie    HERNIA REPAIR      inguinal and umbical    NERVE BLOCK Left 2018    medial branch block    NERVE BLOCK Left 2019    cervical mbb    OTHER SURGICAL HISTORY Right 2014    repair tricepts tondon    TN INJ DX/THER AGNT PARAVERT FACET JOINT, CERV/THORAC, 1ST LEVEL Left 2018    LEFT C3 C4 C5 MEDIAL BRANCH BLOCK performed by Geovanny Blue MD at [place] [address] Left 10/02/2019    Dr Obdulia Hua [address]      Social History     Tobacco Use    Smoking status: Former Smoker     Packs/day: 0.50     Years: 10.00     Pack years: 5.00     Types: Cigarettes     Quit date: 2008     Years since quittin.3    Smokeless tobacco: Current User     Types: Snuff   Substance Use Topics    Alcohol use: No    Drug use: No           Diagnostic studies:    XR FOOT LEFT (MIN 3 VIEWS)    Result Date: 5/25/2021  EXAMINATION: THREE XRAY VIEWS OF THE LEFT FOOT; THREE XRAY VIEWS OF THE RIGHT FOOT 5/25/2021 11:39 am COMPARISON: None. HISTORY: ORDERING SYSTEM PROVIDED HISTORY: Left foot pain TECHNOLOGIST PROVIDED HISTORY: Standing unless patient unable to stand FINDINGS: LEFT FOOT: Radiographs of the left foot demonstrate no fractures with preserved relative alignment. Mild to moderate pes planus deformity. No erosive changes. No significant degenerative spurring. Osseous mineralization is normal.  No soft tissue abnormality. RIGHT FOOT: Radiographs of the right foot demonstrate no fractures with preserved relative alignment. Mild to moderate pes planus deformity. No erosive changes. No significant degenerative spurring. Osseous mineralization is normal.  No soft tissue abnormality. 1.  Mild to moderate bilateral pes planus deformity. 2.  No additional osseous abnormalities in either foot. XR FOOT RIGHT (MIN 3 VIEWS)    Result Date: 5/25/2021  EXAMINATION: THREE XRAY VIEWS OF THE LEFT FOOT; THREE XRAY VIEWS OF THE RIGHT FOOT 5/25/2021 11:39 am COMPARISON: None. HISTORY: ORDERING SYSTEM PROVIDED HISTORY: Left foot pain TECHNOLOGIST PROVIDED HISTORY: Standing unless patient unable to stand FINDINGS: LEFT FOOT: Radiographs of the left foot demonstrate no fractures with preserved relative alignment. Mild to moderate pes planus deformity. No erosive changes. No significant degenerative spurring. Osseous mineralization is normal.  No soft tissue abnormality. RIGHT FOOT: Radiographs of the right foot demonstrate no fractures with preserved relative alignment. Mild to moderate pes planus deformity. No erosive changes. No significant degenerative spurring. Osseous mineralization is normal.  No soft tissue abnormality. 1.  Mild to moderate bilateral pes planus deformity. 2.  No additional osseous abnormalities in either foot. Procedures:     An unna boot  compressive wrap was applied to errors.

## 2021-07-15 ENCOUNTER — PATIENT MESSAGE (OUTPATIENT)
Dept: FAMILY MEDICINE CLINIC | Age: 56
End: 2021-07-15

## 2021-07-15 NOTE — TELEPHONE ENCOUNTER
From: Blaze Vera II  To: Katrina Hodges MD  Sent: 7/15/2021 9:51 AM EDT  Subject: Prescription Question    Dr Birmingham December presently out of [facility], I went to request a refill on [employer] and was notified that this Rx could not be processed through [employer] at this time. I took my last dosage this morning and do not have an appt until next week.  Are you able to process this? (I thought that maybe there was a refill or two left but I discarded the outer box with the Rx info on it)    Breo Ellipta 200-25 MCG/INH Aepb inhaler  Generic name: Fluticasone furoate-vilanterol    [id number] [place] , [facility]

## 2022-02-14 ENCOUNTER — OFFICE VISIT (OUTPATIENT)
Dept: FAMILY MEDICINE CLINIC | Age: 57
End: 2022-02-14
Payer: COMMERCIAL

## 2022-02-14 ENCOUNTER — HOSPITAL ENCOUNTER (OUTPATIENT)
Dept: ULTRASOUND IMAGING | Age: 57
Discharge: HOME | End: 2022-02-14
Payer: COMMERCIAL

## 2022-02-14 ENCOUNTER — HOSPITAL ENCOUNTER (OUTPATIENT)
Age: 57
Discharge: HOME | End: 2022-02-14
Payer: COMMERCIAL

## 2022-02-14 VITALS
TEMPERATURE: 97.7 F | DIASTOLIC BLOOD PRESSURE: 78 MMHG | SYSTOLIC BLOOD PRESSURE: 110 MMHG | WEIGHT: 236.8 LBS | OXYGEN SATURATION: 97 % | HEART RATE: 76 BPM | BODY MASS INDEX: 35.07 KG/M2 | HEIGHT: 69 IN

## 2022-02-14 DIAGNOSIS — E78.49 OTHER HYPERLIPIDEMIA: ICD-10-CM

## 2022-02-14 DIAGNOSIS — R94.5 NONSPECIFIC ABNORMAL RESULTS OF LIVER FUNCTION STUDY: ICD-10-CM

## 2022-02-14 DIAGNOSIS — K21.9 GASTROESOPHAGEAL REFLUX DISEASE, UNSPECIFIED WHETHER ESOPHAGITIS PRESENT: ICD-10-CM

## 2022-02-14 DIAGNOSIS — Z12.5 SCREENING PSA (PROSTATE SPECIFIC ANTIGEN): ICD-10-CM

## 2022-02-14 DIAGNOSIS — K76.0 FATTY LIVER: ICD-10-CM

## 2022-02-14 DIAGNOSIS — J45.41 MODERATE PERSISTENT ASTHMA WITH EXACERBATION: ICD-10-CM

## 2022-02-14 DIAGNOSIS — E78.49 OTHER HYPERLIPIDEMIA: Primary | ICD-10-CM

## 2022-02-14 LAB
ALBUMIN SERPL-MCNC: 4.6 G/DL (ref 3.5–5.2)
ALP BLD-CCNC: 64 U/L (ref 40–129)
ALT SERPL-CCNC: 28 U/L (ref 0–40)
ANION GAP SERPL CALCULATED.3IONS-SCNC: 11 MMOL/L (ref 7–16)
AST SERPL-CCNC: 25 U/L (ref 0–39)
BACTERIA: ABNORMAL /HPF
BASOPHILS ABSOLUTE: 0.05 E9/L (ref 0–0.2)
BASOPHILS RELATIVE PERCENT: 0.9 % (ref 0–2)
BILIRUB SERPL-MCNC: 0.3 MG/DL (ref 0–1.2)
BILIRUBIN URINE: NEGATIVE
BLOOD, URINE: ABNORMAL
BUN BLDV-MCNC: 13 MG/DL (ref 6–20)
CALCIUM SERPL-MCNC: 9.5 MG/DL (ref 8.6–10.2)
CHLORIDE BLD-SCNC: 100 MMOL/L (ref 98–107)
CHOLESTEROL, FASTING: 271 MG/DL (ref 0–199)
CLARITY: CLEAR
CO2: 27 MMOL/L (ref 22–29)
COLOR: YELLOW
CREAT SERPL-MCNC: 1 MG/DL (ref 0.7–1.2)
EOSINOPHILS ABSOLUTE: 0.1 E9/L (ref 0.05–0.5)
EOSINOPHILS RELATIVE PERCENT: 1.8 % (ref 0–6)
FERRITIN: 276 NG/ML
GFR AFRICAN AMERICAN: >60
GFR NON-AFRICAN AMERICAN: >60 ML/MIN/1.73
GLUCOSE FASTING: 86 MG/DL (ref 74–99)
GLUCOSE URINE: NEGATIVE MG/DL
HCT VFR BLD CALC: 49.1 % (ref 37–54)
HDLC SERPL-MCNC: 63 MG/DL
HEMOGLOBIN: 16.7 G/DL (ref 12.5–16.5)
IMMATURE GRANULOCYTES #: 0.02 E9/L
IMMATURE GRANULOCYTES %: 0.4 % (ref 0–5)
IRON SATURATION: 22 % (ref 20–55)
IRON: 83 MCG/DL (ref 59–158)
KETONES, URINE: NEGATIVE MG/DL
LDL CHOLESTEROL CALCULATED: 185 MG/DL (ref 0–99)
LEUKOCYTE ESTERASE, URINE: NEGATIVE
LYMPHOCYTES ABSOLUTE: 1.38 E9/L (ref 1.5–4)
LYMPHOCYTES RELATIVE PERCENT: 25.1 % (ref 20–42)
MCH RBC QN AUTO: 30.6 PG (ref 26–35)
MCHC RBC AUTO-ENTMCNC: 34 % (ref 32–34.5)
MCV RBC AUTO: 89.9 FL (ref 80–99.9)
MONOCYTES ABSOLUTE: 0.4 E9/L (ref 0.1–0.95)
MONOCYTES RELATIVE PERCENT: 7.3 % (ref 2–12)
NEUTROPHILS ABSOLUTE: 3.55 E9/L (ref 1.8–7.3)
NEUTROPHILS RELATIVE PERCENT: 64.5 % (ref 43–80)
NITRITE, URINE: NEGATIVE
PDW BLD-RTO: 12.8 FL (ref 11.5–15)
PH UA: 7 (ref 5–9)
PLATELET # BLD: 209 E9/L (ref 130–450)
PMV BLD AUTO: 10.3 FL (ref 7–12)
POTASSIUM SERPL-SCNC: 4.8 MMOL/L (ref 3.5–5)
PROSTATE SPECIFIC ANTIGEN: 0.58 NG/ML (ref 0–4)
PROTEIN UA: NEGATIVE MG/DL
RBC # BLD: 5.46 E12/L (ref 3.8–5.8)
RBC UA: ABNORMAL /HPF (ref 0–2)
SODIUM BLD-SCNC: 138 MMOL/L (ref 132–146)
SPECIFIC GRAVITY UA: 1.02 (ref 1–1.03)
TOTAL IRON BINDING CAPACITY: 386 MCG/DL (ref 250–450)
TOTAL PROTEIN: 7.5 G/DL (ref 6.4–8.3)
TRIGLYCERIDE, FASTING: 116 MG/DL (ref 0–149)
TSH SERPL DL<=0.05 MIU/L-ACNC: 2.44 UIU/ML (ref 0.27–4.2)
UROBILINOGEN, URINE: 0.2 E.U./DL
VALPROIC ACID LEVEL: 72 MCG/ML (ref 50–100)
VLDLC SERPL CALC-MCNC: 23 MG/DL
WBC # BLD: 5.5 E9/L (ref 4.5–11.5)
WBC UA: ABNORMAL /HPF (ref 0–5)

## 2022-02-14 PROCEDURE — 80053 COMPREHEN METABOLIC PANEL: CPT

## 2022-02-14 PROCEDURE — 86038 ANTINUCLEAR ANTIBODIES: CPT

## 2022-02-14 PROCEDURE — 82390 ASSAY OF CERULOPLASMIN: CPT

## 2022-02-14 PROCEDURE — 36415 COLL VENOUS BLD VENIPUNCTURE: CPT

## 2022-02-14 PROCEDURE — 80164 ASSAY DIPROPYLACETIC ACD TOT: CPT

## 2022-02-14 PROCEDURE — 81001 URINALYSIS AUTO W/SCOPE: CPT

## 2022-02-14 PROCEDURE — 85025 COMPLETE CBC W/AUTO DIFF WBC: CPT

## 2022-02-14 PROCEDURE — 84443 ASSAY THYROID STIM HORMONE: CPT

## 2022-02-14 PROCEDURE — 86255 FLUORESCENT ANTIBODY SCREEN: CPT

## 2022-02-14 PROCEDURE — 87340 HEPATITIS B SURFACE AG IA: CPT

## 2022-02-14 PROCEDURE — 82728 ASSAY OF FERRITIN: CPT

## 2022-02-14 PROCEDURE — 83550 IRON BINDING TEST: CPT

## 2022-02-14 PROCEDURE — G0103 PSA SCREENING: HCPCS

## 2022-02-14 PROCEDURE — 80061 LIPID PANEL: CPT

## 2022-02-14 PROCEDURE — 76705 ECHO EXAM OF ABDOMEN: CPT

## 2022-02-14 PROCEDURE — 86706 HEP B SURFACE ANTIBODY: CPT

## 2022-02-14 PROCEDURE — 82103 ALPHA-1-ANTITRYPSIN TOTAL: CPT

## 2022-02-14 PROCEDURE — 83540 ASSAY OF IRON: CPT

## 2022-02-14 PROCEDURE — 86376 MICROSOMAL ANTIBODY EACH: CPT

## 2022-02-14 PROCEDURE — 86704 HEP B CORE ANTIBODY TOTAL: CPT

## 2022-02-14 PROCEDURE — 86803 HEPATITIS C AB TEST: CPT

## 2022-02-14 PROCEDURE — 99213 OFFICE O/P EST LOW 20 MIN: CPT | Performed by: INTERNAL MEDICINE

## 2022-02-14 ASSESSMENT — PATIENT HEALTH QUESTIONNAIRE - PHQ9
10. IF YOU CHECKED OFF ANY PROBLEMS, HOW DIFFICULT HAVE THESE PROBLEMS MADE IT FOR YOU TO DO YOUR WORK, TAKE CARE OF THINGS AT HOME, OR GET ALONG WITH OTHER PEOPLE: 0
SUM OF ALL RESPONSES TO PHQ9 QUESTIONS 1 & 2: 0
9. THOUGHTS THAT YOU WOULD BE BETTER OFF DEAD, OR OF HURTING YOURSELF: 0
SUM OF ALL RESPONSES TO PHQ QUESTIONS 1-9: 0
5. POOR APPETITE OR OVEREATING: 0
7. TROUBLE CONCENTRATING ON THINGS, SUCH AS READING THE NEWSPAPER OR WATCHING TELEVISION: 0
8. MOVING OR SPEAKING SO SLOWLY THAT OTHER PEOPLE COULD HAVE NOTICED. OR THE OPPOSITE, BEING SO FIGETY OR RESTLESS THAT YOU HAVE BEEN MOVING AROUND A LOT MORE THAN USUAL: 0
4. FEELING TIRED OR HAVING LITTLE ENERGY: 0
3. TROUBLE FALLING OR STAYING ASLEEP: 0
6. FEELING BAD ABOUT YOURSELF - OR THAT YOU ARE A FAILURE OR HAVE LET YOURSELF OR YOUR FAMILY DOWN: 0
2. FEELING DOWN, DEPRESSED OR HOPELESS: 0
SUM OF ALL RESPONSES TO PHQ QUESTIONS 1-9: 0
1. LITTLE INTEREST OR PLEASURE IN DOING THINGS: 0
SUM OF ALL RESPONSES TO PHQ QUESTIONS 1-9: 0
SUM OF ALL RESPONSES TO PHQ QUESTIONS 1-9: 0

## 2022-02-14 ASSESSMENT — ENCOUNTER SYMPTOMS
EYE DISCHARGE: 0
ABDOMINAL PAIN: 0
SHORTNESS OF BREATH: 0
BLOOD IN STOOL: 0
NAUSEA: 0

## 2022-02-14 NOTE — PROGRESS NOTES
Chief Complaint   Patient presents with   Claire Hubbard Other     Dr Bonni Holter told him he has elevated ALT AST and would like to discuss. Labs done on 01/21/2021        HPI:  Patient is here for follow-up     Feeling well    Has been following with Dr Ramy Luu    Had labs done today       Allergy and Medications are reviewed and updated. Past Medical History, Surgical History, and Family History has been reviewed and updated. Review of Systems:  Review of Systems   Constitutional: Negative for chills and fever. HENT: Negative for congestion and ear pain. Eyes: Negative for discharge. Respiratory: Negative for shortness of breath (No new SOB). Cardiovascular: Negative for chest pain and leg swelling. Gastrointestinal: Negative for abdominal pain, blood in stool and nausea. Endocrine: Negative for polydipsia. Genitourinary: Negative for flank pain and hematuria. Musculoskeletal: Negative for myalgias and neck pain. Skin: Negative for rash. Neurological: Negative for dizziness and seizures. Hematological: Does not bruise/bleed easily. Psychiatric/Behavioral: Negative for hallucinations and suicidal ideas. Vitals:    02/14/22 1616   BP: 110/78   Position: Sitting   Pulse: 76   Temp: 97.7 °F (36.5 °C)   TempSrc: Temporal   SpO2: 97%   Weight: 236 lb 12.8 oz (107.4 kg)   Height: 5' 9\" (1.753 m)       Physical Exam  Vitals reviewed. Constitutional:       Appearance: He is well-developed. HENT:      Head: Normocephalic and atraumatic. Eyes:      Conjunctiva/sclera: Conjunctivae normal.      Pupils: Pupils are equal, round, and reactive to light. Neck:      Vascular: No JVD. Cardiovascular:      Rate and Rhythm: Normal rate and regular rhythm. Pulmonary:      Effort: Pulmonary effort is normal.      Breath sounds: Normal breath sounds. No rales. Abdominal:      General: Bowel sounds are normal.      Palpations: Abdomen is soft.    Musculoskeletal:         General: Normal range of motion. Lymphadenopathy:      Cervical: No cervical adenopathy. Skin:     General: Skin is warm and dry. Neurological:      Mental Status: He is alert and oriented to person, place, and time.    Psychiatric:         Behavior: Behavior normal.          Labs :    Lab Results   Component Value Date    WBC 5.5 02/14/2022    HGB 16.7 (H) 02/14/2022    HCT 49.1 02/14/2022     02/14/2022    CHOL 236 (H) 01/02/2017    TRIG 202 (H) 01/02/2017    HDL 58 01/18/2020    ALT 94 (H) 01/21/2021    AST 34 01/21/2021     01/21/2021    K 5.6 (H) 01/21/2021    CL 98 01/21/2021    CREATININE 0.9 01/21/2021    BUN 21 (H) 01/21/2021    CO2 32 (H) 01/21/2021    TSH 3.030 01/18/2020    PSA 0.48 02/08/2018    GLUF 85 01/18/2020     Lab Results   Component Value Date    COLORU Yellow 02/04/2019    NITRU Negative 02/04/2019    GLUCOSEU Negative 02/04/2019    KETUA TRACE 02/04/2019    UROBILINOGEN 0.2 02/04/2019    BILIRUBINUR Negative 02/04/2019     Lab Results   Component Value Date    PSA 0.48 02/08/2018             ASSESSMENT     Patient Active Problem List    Diagnosis Date Noted    Plantar fasciitis 05/26/2021    Pain in both feet 05/26/2021    Localized edema 05/26/2021    Difficulty walking 05/26/2021    COPD (chronic obstructive pulmonary disease) ([facility] [address] 01/18/2021    Bipolar 1 disorder ([facility] [address]     Pneumonia due to COVID-19 virus 01/17/2021    Moderate persistent asthma with exacerbation 11/18/2019    History of total left hip replacement - 10/2/19 11/18/2019    Spondylarthrosis     Chronic pain syndrome 10/12/2018    Cervical disc disorder 10/12/2018    Cervical facet joint syndrome 10/12/2018    Spondylosis of cervical region without myelopathy or radiculopathy 10/12/2018    Neck pain 05/21/2018    Hyperlipemia 03/12/2018    Asthma 03/12/2018    Arthritis 03/12/2018    SELENA (obstructive sleep apnea) 03/12/2018    Wrist pain 12/15/2014    TFCC (triangular fibrocartilage complex) tear 12/15/2014  Elbow contusion 02/04/2014    Triceps tendon rupture 02/04/2014    Elbow pain 02/04/2014    Cubital tunnel syndrome 02/04/2014        Diagnosis:     ICD-10-CM    1. Other hyperlipidemia  E78.49    2. Moderate persistent asthma with exacerbation  J45.41 [facility]   3. Gastroesophageal reflux disease, unspecified whether esophagitis present  K21.9    4. Fatty liver  K76.0        PLAN:        labs are pending    US- liver noted  CBC  Noted   Other labs pending     Ref to Pulmonary    Pt is stable on current medical treatment. Continue current treatment plan    Side effects/Adverse effects/Precautions are reviewed with patient. Low salt, Low Carb diet an low fat diet  Continue medications as advised and take them regularly  Regular exercises as advised    Discussed natural and expected course of this diagnosis and need to alert me if symptoms do not follow expected course, or if any worse. Smoking cessation if applicable, discussed with patient. There are no Patient Instructions on file for this visit. Return in about 2 months (around 4/14/2022).

## 2022-02-16 LAB
ANTI-NUCLEAR ANTIBODY (ANA): NEGATIVE
CERULOPLASMIN: 20 MG/DL (ref 15–30)
HEPATITIS B CORE TOTAL ANTIBODY: NONREACTIVE

## 2022-02-17 LAB
ALPHA-1 ANTITRYPSIN: 102 MG/DL (ref 90–200)
ANTI-MITOCHONDRIAL AB, IFA: NEGATIVE
HBV SURFACE AB TITR SER: NORMAL {TITER}
HEPATITIS B SURFACE ANTIGEN INTERPRETATION: NORMAL
HEPATITIS C ANTIBODY INTERPRETATION: NORMAL
LIVER-KIDNEY MICROSOME-1 AB IGG: 1.2 U (ref 0–24.9)
SMOOTH MUSCLE ANTIBODY: NEGATIVE

## 2022-04-27 ENCOUNTER — OFFICE VISIT (OUTPATIENT)
Dept: FAMILY MEDICINE CLINIC | Age: 57
End: 2022-04-27
Payer: COMMERCIAL

## 2022-04-27 VITALS
DIASTOLIC BLOOD PRESSURE: 78 MMHG | BODY MASS INDEX: 35.25 KG/M2 | HEIGHT: 69 IN | OXYGEN SATURATION: 98 % | HEART RATE: 76 BPM | SYSTOLIC BLOOD PRESSURE: 112 MMHG | WEIGHT: 238 LBS | TEMPERATURE: 97.5 F

## 2022-04-27 DIAGNOSIS — J45.41 MODERATE PERSISTENT ASTHMA WITH EXACERBATION: ICD-10-CM

## 2022-04-27 DIAGNOSIS — K76.0 FATTY LIVER: ICD-10-CM

## 2022-04-27 DIAGNOSIS — E78.49 OTHER HYPERLIPIDEMIA: Primary | ICD-10-CM

## 2022-04-27 DIAGNOSIS — K21.9 GASTROESOPHAGEAL REFLUX DISEASE, UNSPECIFIED WHETHER ESOPHAGITIS PRESENT: ICD-10-CM

## 2022-04-27 PROBLEM — U07.1 PNEUMONIA DUE TO COVID-19 VIRUS: Status: RESOLVED | Noted: 2021-01-17 | Resolved: 2022-04-27

## 2022-04-27 PROBLEM — J12.82 PNEUMONIA DUE TO COVID-19 VIRUS: Status: RESOLVED | Noted: 2021-01-17 | Resolved: 2022-04-27

## 2022-04-27 PROCEDURE — 99213 OFFICE O/P EST LOW 20 MIN: CPT | Performed by: INTERNAL MEDICINE

## 2022-04-27 RX ORDER — ALBUTEROL SULFATE 90 UG/1
2 AEROSOL, METERED RESPIRATORY (INHALATION) PRN
Qty: 3 EACH | Refills: 1 | Status: SHIPPED
Start: 2022-04-27 | End: 2022-08-31 | Stop reason: SDUPTHER

## 2022-04-27 RX ORDER — DOXEPIN HYDROCHLORIDE 50 MG/1
CAPSULE ORAL
COMMUNITY
Start: 2022-04-24 | End: 2022-08-22

## 2022-04-27 SDOH — ECONOMIC STABILITY: FOOD INSECURITY: WITHIN THE PAST 12 MONTHS, YOU WORRIED THAT YOUR FOOD WOULD RUN OUT BEFORE YOU GOT MONEY TO BUY MORE.: NEVER TRUE

## 2022-04-27 SDOH — ECONOMIC STABILITY: FOOD INSECURITY: WITHIN THE PAST 12 MONTHS, THE FOOD YOU BOUGHT JUST DIDN'T LAST AND YOU DIDN'T HAVE MONEY TO GET MORE.: NEVER TRUE

## 2022-04-27 ASSESSMENT — ENCOUNTER SYMPTOMS
SHORTNESS OF BREATH: 0
BLOOD IN STOOL: 0
EYE DISCHARGE: 0
ABDOMINAL PAIN: 0
NAUSEA: 0

## 2022-04-27 ASSESSMENT — SOCIAL DETERMINANTS OF HEALTH (SDOH): HOW HARD IS IT FOR YOU TO PAY FOR THE VERY BASICS LIKE FOOD, HOUSING, MEDICAL CARE, AND HEATING?: NOT HARD AT ALL

## 2022-04-27 NOTE — PROGRESS NOTES
Chief Complaint   Patient presents with    Hyperlipidemia    Medication Refill    Discuss Labs     Review labs from 02/14/2022        HPI:  Patient is here for follow-up     Feeling okay    Following with ortho - Rt knee stress fracture- Conservative treatment       Allergy and Medications are reviewed and updated. Past Medical History, Surgical History, and Family History has been reviewed and updated. Review of Systems:  Review of Systems   Constitutional: Negative for chills and fever. HENT: Negative for congestion and ear pain. Eyes: Negative for discharge. Respiratory: Negative for shortness of breath (No new SOB). Cardiovascular: Negative for chest pain and leg swelling. Gastrointestinal: Negative for abdominal pain, blood in stool and nausea. Endocrine: Negative for polydipsia. Genitourinary: Negative for flank pain and hematuria. Musculoskeletal: Negative for myalgias and neck pain. Skin: Negative for rash. Neurological: Negative for dizziness and seizures. Hematological: Does not bruise/bleed easily. Psychiatric/Behavioral: Negative for hallucinations and suicidal ideas. Vitals:    04/27/22 1627   BP: 112/78   Position: Sitting   Pulse: 76   Temp: 97.5 °F (36.4 °C)   TempSrc: Temporal   SpO2: 98%   Weight: 238 lb (108 kg)   Height: 5' 9\" (1.753 m)       Physical Exam  Vitals reviewed. Constitutional:       Appearance: He is well-developed. HENT:      Head: Normocephalic and atraumatic. Eyes:      Conjunctiva/sclera: Conjunctivae normal.      Pupils: Pupils are equal, round, and reactive to light. Neck:      Vascular: No JVD. Cardiovascular:      Rate and Rhythm: Normal rate and regular rhythm. Pulmonary:      Effort: Pulmonary effort is normal.      Breath sounds: Normal breath sounds. No rales. Abdominal:      General: Bowel sounds are normal.      Palpations: Abdomen is soft. Musculoskeletal:         General: Normal range of motion. Lymphadenopathy:      Cervical: No cervical adenopathy. Skin:     General: Skin is warm and dry. Neurological:      Mental Status: He is alert and oriented to person, place, and time.    Psychiatric:         Behavior: Behavior normal.          Labs :    Lab Results   Component Value Date    WBC 5.5 02/14/2022    HGB 16.7 (H) 02/14/2022    HCT 49.1 02/14/2022     02/14/2022    CHOL 236 (H) 01/02/2017    TRIG 202 (H) 01/02/2017    HDL 63 02/14/2022    ALT 28 02/14/2022    AST 25 02/14/2022     02/14/2022    K 4.8 02/14/2022     02/14/2022    CREATININE 1.0 02/14/2022    BUN 13 02/14/2022    CO2 27 02/14/2022    TSH 2.440 02/14/2022    PSA 0.58 02/14/2022    GLUF 86 02/14/2022     Lab Results   Component Value Date    COLORU Yellow 02/14/2022    NITRU Negative 02/14/2022    GLUCOSEU Negative 02/14/2022    KETUA Negative 02/14/2022    UROBILINOGEN 0.2 02/14/2022    BILIRUBINUR Negative 02/14/2022     Lab Results   Component Value Date    PSA 0.58 02/14/2022             ASSESSMENT     Patient Active Problem List    Diagnosis Date Noted    Plantar fasciitis 05/26/2021    Pain in both feet 05/26/2021    Localized edema 05/26/2021    Difficulty walking 05/26/2021    COPD (chronic obstructive pulmonary disease) ([facility] [address] 01/18/2021    Bipolar 1 disorder ([facility] [address]     Moderate persistent asthma with exacerbation 11/18/2019    History of total left hip replacement - 10/2/19 11/18/2019    Spondylarthrosis     Chronic pain syndrome 10/12/2018    Cervical disc disorder 10/12/2018    Cervical facet joint syndrome 10/12/2018    Spondylosis of cervical region without myelopathy or radiculopathy 10/12/2018    Neck pain 05/21/2018    Hyperlipemia 03/12/2018    Arthritis 03/12/2018    SELENA (obstructive sleep apnea) 03/12/2018    Wrist pain 12/15/2014    TFCC (triangular fibrocartilage complex) tear 12/15/2014    Elbow contusion 02/04/2014    Triceps tendon rupture 02/04/2014    Elbow pain 02/04/2014  Cubital tunnel syndrome 02/04/2014        Diagnosis:     ICD-10-CM    1. Other hyperlipidemia  E78.49 Lipid, Fasting     Comprehensive Metabolic Panel, Fasting   2. Moderate persistent asthma with exacerbation  J45.41 albuterol sulfate  (90 Base) MCG/ACT inhaler   3. Gastroesophageal reflux disease, unspecified whether esophagitis present  K21.9    4. Fatty liver  K76.0        PLAN:         Pt is stable on current medical treatment. Continue current treatment plan    Side effects/Adverse effects/Precautions are reviewed with patient. Low salt, Low Carb diet an low fat diet  Continue medications as advised and take them regularly  Regular exercises as advised    Discussed natural and expected course of this diagnosis and need to alert me if symptoms do not follow expected course, or if any worse. Smoking cessation if applicable, discussed with patient. Advise to have ( Fasting) lab test prior to next visit. Had colonoscopy - 2/22- Dr Nydia Vital     Patient Instructions   The medication list included in this document is our record of what you are currently taking, including any changes that were made at today's visit.  If you find any differences when compared to your medications at home, or have any questions that were not answered at your visit, please contact the office. Advise to have ( Fasting) lab test prior to next visit. Return in about 3 months (around 7/27/2022).

## 2022-07-05 DIAGNOSIS — J45.41 MODERATE PERSISTENT ASTHMA WITH EXACERBATION: Primary | ICD-10-CM

## 2022-07-05 RX ORDER — FLUTICASONE FUROATE AND VILANTEROL 200; 25 UG/1; UG/1
1 POWDER RESPIRATORY (INHALATION) DAILY
Qty: 3 EACH | Refills: 1 | Status: SHIPPED
Start: 2022-07-05 | End: 2022-09-15

## 2022-08-19 ENCOUNTER — HOSPITAL ENCOUNTER (OUTPATIENT)
Age: 57
Discharge: HOME | End: 2022-08-19
Payer: COMMERCIAL

## 2022-08-19 DIAGNOSIS — E78.49 OTHER HYPERLIPIDEMIA: ICD-10-CM

## 2022-08-19 LAB
ALBUMIN SERPL-MCNC: 4.7 G/DL (ref 3.5–5.2)
ALP BLD-CCNC: 64 U/L (ref 40–129)
ALT SERPL-CCNC: 45 U/L (ref 0–40)
ANION GAP SERPL CALCULATED.3IONS-SCNC: 10 MMOL/L (ref 7–16)
AST SERPL-CCNC: 34 U/L (ref 0–39)
BILIRUB SERPL-MCNC: 0.3 MG/DL (ref 0–1.2)
BUN BLDV-MCNC: 14 MG/DL (ref 6–20)
CALCIUM SERPL-MCNC: 9.2 MG/DL (ref 8.6–10.2)
CHLORIDE BLD-SCNC: 103 MMOL/L (ref 98–107)
CHOLESTEROL, FASTING: 271 MG/DL (ref 0–199)
CO2: 27 MMOL/L (ref 22–29)
CREAT SERPL-MCNC: 1 MG/DL (ref 0.7–1.2)
GFR AFRICAN AMERICAN: >60
GFR NON-AFRICAN AMERICAN: >60 ML/MIN/1.73
GLUCOSE FASTING: 87 MG/DL (ref 74–99)
HDLC SERPL-MCNC: 55 MG/DL
LDL CHOLESTEROL CALCULATED: 183 MG/DL (ref 0–99)
POTASSIUM SERPL-SCNC: 4.1 MMOL/L (ref 3.5–5)
SODIUM BLD-SCNC: 140 MMOL/L (ref 132–146)
TOTAL PROTEIN: 6.9 G/DL (ref 6.4–8.3)
TRIGLYCERIDE, FASTING: 164 MG/DL (ref 0–149)
VLDLC SERPL CALC-MCNC: 33 MG/DL

## 2022-08-19 PROCEDURE — 36415 COLL VENOUS BLD VENIPUNCTURE: CPT

## 2022-08-19 PROCEDURE — 80061 LIPID PANEL: CPT

## 2022-08-19 PROCEDURE — 80053 COMPREHEN METABOLIC PANEL: CPT

## 2022-08-22 ENCOUNTER — OFFICE VISIT (OUTPATIENT)
Dept: FAMILY MEDICINE CLINIC | Age: 57
End: 2022-08-22
Payer: COMMERCIAL

## 2022-08-22 VITALS
OXYGEN SATURATION: 97 % | HEART RATE: 78 BPM | DIASTOLIC BLOOD PRESSURE: 70 MMHG | BODY MASS INDEX: 35.98 KG/M2 | HEIGHT: 69 IN | SYSTOLIC BLOOD PRESSURE: 118 MMHG | TEMPERATURE: 97.7 F | WEIGHT: 242.9 LBS

## 2022-08-22 DIAGNOSIS — K21.9 GASTROESOPHAGEAL REFLUX DISEASE, UNSPECIFIED WHETHER ESOPHAGITIS PRESENT: ICD-10-CM

## 2022-08-22 DIAGNOSIS — J45.41 MODERATE PERSISTENT ASTHMA WITH EXACERBATION: ICD-10-CM

## 2022-08-22 DIAGNOSIS — J44.9 CHRONIC OBSTRUCTIVE PULMONARY DISEASE, UNSPECIFIED COPD TYPE (HCC): ICD-10-CM

## 2022-08-22 DIAGNOSIS — E78.49 OTHER HYPERLIPIDEMIA: Primary | ICD-10-CM

## 2022-08-22 PROCEDURE — 99214 OFFICE O/P EST MOD 30 MIN: CPT | Performed by: INTERNAL MEDICINE

## 2022-08-22 RX ORDER — QUETIAPINE FUMARATE 50 MG/1
TABLET, FILM COATED ORAL
COMMUNITY
Start: 2022-08-15

## 2022-08-22 RX ORDER — ROSUVASTATIN CALCIUM 10 MG/1
10 TABLET, COATED ORAL NIGHTLY
Qty: 90 TABLET | Refills: 0 | Status: SHIPPED | OUTPATIENT
Start: 2022-08-22 | End: 2022-11-20

## 2022-08-22 ASSESSMENT — PATIENT HEALTH QUESTIONNAIRE - PHQ9
SUM OF ALL RESPONSES TO PHQ QUESTIONS 1-9: 0
2. FEELING DOWN, DEPRESSED OR HOPELESS: 0
SUM OF ALL RESPONSES TO PHQ9 QUESTIONS 1 & 2: 0
SUM OF ALL RESPONSES TO PHQ QUESTIONS 1-9: 0
1. LITTLE INTEREST OR PLEASURE IN DOING THINGS: 0
SUM OF ALL RESPONSES TO PHQ QUESTIONS 1-9: 0
SUM OF ALL RESPONSES TO PHQ QUESTIONS 1-9: 0

## 2022-08-22 ASSESSMENT — LIFESTYLE VARIABLES
HOW OFTEN DO YOU HAVE A DRINK CONTAINING ALCOHOL: NEVER
HOW MANY STANDARD DRINKS CONTAINING ALCOHOL DO YOU HAVE ON A TYPICAL DAY: PATIENT DOES NOT DRINK

## 2022-08-22 ASSESSMENT — ENCOUNTER SYMPTOMS
NAUSEA: 0
SHORTNESS OF BREATH: 0
ABDOMINAL PAIN: 0
BLOOD IN STOOL: 0
EYE DISCHARGE: 0

## 2022-08-22 NOTE — ASSESSMENT & PLAN NOTE
Well-controlled, continue current medications     On Breo one puff daily  Albuterol as needed    Duoneb nebulizer as needed

## 2022-08-22 NOTE — PROGRESS NOTES
Chief Complaint   Patient presents with    Hyperlipidemia     Here for a follow up     Discuss Labs     Review labs from 08/19/2022       HPI:  Patient is here for follow-up     No complaints      Allergy and Medications are reviewed and updated. Past Medical History, Surgical History, and Family History has been reviewed and updated. Review of Systems:  Review of Systems   Constitutional:  Negative for chills and fever. HENT:  Negative for congestion and ear pain. Eyes:  Negative for discharge. Respiratory:  Negative for shortness of breath (No new SOB). Cardiovascular:  Negative for chest pain and leg swelling. Gastrointestinal:  Negative for abdominal pain, blood in stool and nausea. Endocrine: Negative for polydipsia. Genitourinary:  Negative for flank pain and hematuria. Musculoskeletal:  Negative for myalgias and neck pain. Skin:  Negative for rash. Neurological:  Negative for dizziness and seizures. Hematological:  Does not bruise/bleed easily. Psychiatric/Behavioral:  Negative for hallucinations and suicidal ideas. Vitals:    08/22/22 1633   BP: 118/70   Position: Sitting   Pulse: 78   Temp: 97.7 °F (36.5 °C)   TempSrc: Temporal   SpO2: 97%   Weight: 242 lb 14.4 oz (110.2 kg)   Height: 5' 9\" (1.753 m)       Physical Exam  Vitals reviewed. Constitutional:       Appearance: He is well-developed. HENT:      Head: Normocephalic and atraumatic. Eyes:      Conjunctiva/sclera: Conjunctivae normal.      Pupils: Pupils are equal, round, and reactive to light. Neck:      Vascular: No JVD. Cardiovascular:      Rate and Rhythm: Normal rate and regular rhythm. Pulmonary:      Effort: Pulmonary effort is normal.      Breath sounds: Normal breath sounds. Abdominal:      General: Bowel sounds are normal.      Palpations: Abdomen is soft. Musculoskeletal:         General: Normal range of motion. Skin:     General: Skin is warm and dry.    Neurological:      Mental Status: He is alert and oriented to person, place, and time.    Psychiatric:         Behavior: Behavior normal.        Labs :    Lab Results   Component Value Date    WBC 5.5 02/14/2022    HGB 16.7 (H) 02/14/2022    HCT 49.1 02/14/2022     02/14/2022    CHOL 236 (H) 01/02/2017    TRIG 202 (H) 01/02/2017    HDL 55 08/19/2022    ALT 45 (H) 08/19/2022    AST 34 08/19/2022     08/19/2022    K 4.1 08/19/2022     08/19/2022    CREATININE 1.0 08/19/2022    BUN 14 08/19/2022    CO2 27 08/19/2022    TSH 2.440 02/14/2022    PSA 0.58 02/14/2022    GLUF 87 08/19/2022     Lab Results   Component Value Date/Time    COLORU Yellow 02/14/2022 05:14 PM    NITRU Negative 02/14/2022 05:14 PM    GLUCOSEU Negative 02/14/2022 05:14 PM    KETUA Negative 02/14/2022 05:14 PM    UROBILINOGEN 0.2 02/14/2022 05:14 PM    BILIRUBINUR Negative 02/14/2022 05:14 PM     Lab Results   Component Value Date/Time    PSA 0.58 02/14/2022 01:06 PM             ASSESSMENT     Patient Active Problem List    Diagnosis Date Noted    Plantar fasciitis 05/26/2021    Pain in both feet 05/26/2021    Localized edema 05/26/2021    Difficulty walking 05/26/2021    COPD (chronic obstructive pulmonary disease) ([facility] [address] 01/18/2021     Assessment & Plan Note:      Well-controlled, continue current medications     On Breo one puff daily  Albuterol as needed    Duoneb nebulizer as needed         Bipolar 1 disorder ([facility] [address]     Moderate persistent asthma with exacerbation 11/18/2019     Assessment & Plan Note:      Well-controlled, continue current medications     Albuterol inhaler as needed  Breo Ellipta one puff daily  Duoneb nebulizer as needed       History of total left hip replacement - 10/2/19 11/18/2019    Spondylarthrosis     Chronic pain syndrome 10/12/2018    Cervical disc disorder 10/12/2018    Cervical facet joint syndrome 10/12/2018    Spondylosis of cervical region without myelopathy or radiculopathy 10/12/2018    Neck pain 05/21/2018    Hyperlipemia 03/12/2018     Assessment & Plan Note:      Uncontrolled, changes made today: Add Crestor 10 mg qd       Arthritis 03/12/2018    SELENA (obstructive sleep apnea) 03/12/2018    Wrist pain 12/15/2014    TFCC (triangular fibrocartilage complex) tear 12/15/2014    Elbow contusion 02/04/2014    Triceps tendon rupture 02/04/2014    Elbow pain 02/04/2014    Cubital tunnel syndrome 02/04/2014        Diagnosis:   1. Other hyperlipidemia  Assessment & Plan:   Uncontrolled, changes made today: Add Crestor 10 mg qd   Orders:  -     rosuvastatin (CRESTOR) 10 MG tablet; Take 1 tablet by mouth nightly, Disp-90 tablet, R-0Normal  -     Comprehensive Metabolic Panel, Fasting; Future  -     Lipid, Fasting; Future  -     TSH; Future  2. Moderate persistent asthma with exacerbation  Assessment & Plan:   Well-controlled, continue current medications     Albuterol inhaler as needed  Breo Ellipta one puff daily  Duoneb nebulizer as needed   3. Gastroesophageal reflux disease, unspecified whether esophagitis present  4. Chronic obstructive pulmonary disease, unspecified COPD type ([facility] [address]  Assessment & Plan:   Well-controlled, continue current medications     On Breo one puff daily  Albuterol as needed    Duoneb nebulizer as needed          PLAN:     Labs reviewed    Add Crestor 10 mg at night    AE/SE - reviewed    Pt is stable on current medical treatment. Continue current treatment plan    Side effects/Adverse effects/Precautions are reviewed with patient. Low salt, Low Carb diet an low fat diet  Continue medications as advised and take them regularly  Regular exercises as advised    Discussed natural and expected course of this diagnosis and need to alert me if symptoms do not follow expected course, or if any worse. Smoking cessation if applicable, discussed with patient. Advise to have ( Fasting) lab test prior to next visit. There are no Patient Instructions on file for this visit.     Return in about 3 months (around 11/22/2022).

## 2022-08-22 NOTE — ASSESSMENT & PLAN NOTE
Well-controlled, continue current medications     Albuterol inhaler as needed  Breo Ellipta one puff daily  Duoneb nebulizer as needed

## 2022-08-31 DIAGNOSIS — J45.41 MODERATE PERSISTENT ASTHMA WITH EXACERBATION: ICD-10-CM

## 2022-08-31 RX ORDER — ALBUTEROL SULFATE 90 UG/1
2 AEROSOL, METERED RESPIRATORY (INHALATION) PRN
Qty: 1 EACH | Refills: 0 | Status: SHIPPED | OUTPATIENT
Start: 2022-08-31 | End: 2022-11-29

## 2022-09-02 ENCOUNTER — OFFICE VISIT (OUTPATIENT)
Dept: FAMILY MEDICINE CLINIC | Age: 57
End: 2022-09-02
Payer: COMMERCIAL

## 2022-09-02 VITALS
HEIGHT: 69 IN | OXYGEN SATURATION: 94 % | HEART RATE: 98 BPM | DIASTOLIC BLOOD PRESSURE: 80 MMHG | WEIGHT: 237.2 LBS | BODY MASS INDEX: 35.13 KG/M2 | SYSTOLIC BLOOD PRESSURE: 122 MMHG | TEMPERATURE: 97.2 F

## 2022-09-02 DIAGNOSIS — J45.41 MODERATE PERSISTENT ASTHMA WITH EXACERBATION: Primary | ICD-10-CM

## 2022-09-02 DIAGNOSIS — E78.49 OTHER HYPERLIPIDEMIA: ICD-10-CM

## 2022-09-02 DIAGNOSIS — J44.9 CHRONIC OBSTRUCTIVE PULMONARY DISEASE, UNSPECIFIED COPD TYPE (HCC): ICD-10-CM

## 2022-09-02 PROCEDURE — 99214 OFFICE O/P EST MOD 30 MIN: CPT | Performed by: INTERNAL MEDICINE

## 2022-09-02 RX ORDER — PREDNISONE 20 MG/1
20 TABLET ORAL 2 TIMES DAILY
Qty: 10 TABLET | Refills: 0 | Status: SHIPPED | OUTPATIENT
Start: 2022-09-02 | End: 2022-09-07

## 2022-09-02 RX ORDER — METHYLPREDNISOLONE SODIUM SUCCINATE 125 MG/2ML
125 INJECTION, POWDER, LYOPHILIZED, FOR SOLUTION INTRAMUSCULAR; INTRAVENOUS ONCE
Status: CANCELLED | OUTPATIENT
Start: 2022-09-02 | End: 2022-09-02

## 2022-09-02 RX ORDER — DEXTROMETHORPHAN HYDROBROMIDE AND PROMETHAZINE HYDROCHLORIDE 15; 6.25 MG/5ML; MG/5ML
5 SYRUP ORAL 4 TIMES DAILY PRN
Qty: 180 ML | Refills: 0 | Status: SHIPPED
Start: 2022-09-02 | End: 2022-09-15

## 2022-09-02 RX ORDER — IPRATROPIUM BROMIDE AND ALBUTEROL SULFATE 2.5; .5 MG/3ML; MG/3ML
1 SOLUTION RESPIRATORY (INHALATION) EVERY 6 HOURS
Qty: 120 EACH | Refills: 0 | Status: SHIPPED | OUTPATIENT
Start: 2022-09-02 | End: 2022-12-01

## 2022-09-02 RX ORDER — AZITHROMYCIN 250 MG/1
TABLET, FILM COATED ORAL
Qty: 1 PACKET | Refills: 0 | Status: SHIPPED
Start: 2022-09-02 | End: 2022-09-15

## 2022-09-02 ASSESSMENT — ENCOUNTER SYMPTOMS
ABDOMINAL PAIN: 0
NAUSEA: 0
EYE DISCHARGE: 0
BLOOD IN STOOL: 0
SHORTNESS OF BREATH: 1
EYE PAIN: 0
COUGH: 1
WHEEZING: 1
SORE THROAT: 0
SINUS PAIN: 0

## 2022-09-02 NOTE — PROGRESS NOTES
Chief Complaint   Patient presents with    Asthma     Exacerbation of asthma started on 08/31/2022, took 2- COVID test both negative. Started on nebulizer treatments (Duoneb)           HPI:  Patient is here as above  Was on vacation, humid weather precipitated his acute asthma     Started nebulizer today  Has cough, wheezing, congestion  Tested for covid x 2 - neg     No fever or chills      Allergy and Medications are reviewed and updated. Past Medical History, Surgical History, and Family History has been reviewed and updated. Review of Systems:  Review of Systems   Constitutional:  Negative for chills and fever. HENT:  Positive for congestion. Negative for sinus pain and sore throat. Eyes:  Negative for pain and discharge. Respiratory:  Positive for cough, shortness of breath and wheezing. Cardiovascular:  Negative for chest pain. Gastrointestinal:  Negative for abdominal pain, blood in stool and nausea. Genitourinary:  Negative for flank pain and frequency. Musculoskeletal:  Negative for neck pain. Hematological:  Does not bruise/bleed easily. Psychiatric/Behavioral:  Negative for suicidal ideas. Vitals:    09/02/22 1103   BP: 122/80   Position: Sitting   Pulse: 98   Temp: 97.2 °F (36.2 °C)   TempSrc: Temporal   SpO2: 94%   Weight: 237 lb 3.2 oz (107.6 kg)   Height: 5' 9\" (1.753 m)       Physical Exam  Vitals reviewed. Constitutional:       Appearance: He is well-developed. HENT:      Head: Normocephalic and atraumatic. Eyes:      Conjunctiva/sclera: Conjunctivae normal.      Pupils: Pupils are equal, round, and reactive to light. Neck:      Vascular: No JVD. Cardiovascular:      Rate and Rhythm: Normal rate and regular rhythm. Pulmonary:      Effort: Pulmonary effort is normal. No tachypnea or respiratory distress. Breath sounds: Decreased breath sounds and rhonchi present. Abdominal:      General: Bowel sounds are normal.      Palpations: Abdomen is soft. Musculoskeletal:         General: Normal range of motion. Skin:     General: Skin is warm and dry. Neurological:      Mental Status: He is alert and oriented to person, place, and time.    Psychiatric:         Behavior: Behavior normal.        Labs :    Lab Results   Component Value Date    WBC 5.5 02/14/2022    HGB 16.7 (H) 02/14/2022    HCT 49.1 02/14/2022     02/14/2022    CHOL 236 (H) 01/02/2017    TRIG 202 (H) 01/02/2017    HDL 55 08/19/2022    ALT 45 (H) 08/19/2022    AST 34 08/19/2022     08/19/2022    K 4.1 08/19/2022     08/19/2022    CREATININE 1.0 08/19/2022    BUN 14 08/19/2022    CO2 27 08/19/2022    TSH 2.440 02/14/2022    PSA 0.58 02/14/2022    GLUF 87 08/19/2022     Lab Results   Component Value Date/Time    COLORU Yellow 02/14/2022 05:14 PM    NITRU Negative 02/14/2022 05:14 PM    GLUCOSEU Negative 02/14/2022 05:14 PM    KETUA Negative 02/14/2022 05:14 PM    UROBILINOGEN 0.2 02/14/2022 05:14 PM    BILIRUBINUR Negative 02/14/2022 05:14 PM     Lab Results   Component Value Date/Time    PSA 0.58 02/14/2022 01:06 PM             ASSESSMENT     Patient Active Problem List    Diagnosis Date Noted    Plantar fasciitis 05/26/2021    Pain in both feet 05/26/2021    Localized edema 05/26/2021    Difficulty walking 05/26/2021    COPD (chronic obstructive pulmonary disease) ([facility] [address] 01/18/2021    Bipolar 1 disorder (HCC)     Moderate persistent asthma with exacerbation 11/18/2019    History of total left hip replacement - 10/2/19 11/18/2019    Spondylarthrosis     Chronic pain syndrome 10/12/2018    Cervical disc disorder 10/12/2018    Cervical facet joint syndrome 10/12/2018    Spondylosis of cervical region without myelopathy or radiculopathy 10/12/2018    Neck pain 05/21/2018    Hyperlipemia 03/12/2018     Assessment & Plan Note:      Uncontrolled, continue current medications   Crestor 19 mg at night       Arthritis 03/12/2018    SELENA (obstructive sleep apnea) 03/12/2018    Wrist pain

## 2022-09-06 ENCOUNTER — HOSPITAL ENCOUNTER (OUTPATIENT)
Age: 57
Discharge: HOME | End: 2022-09-08
Payer: COMMERCIAL

## 2022-09-06 ENCOUNTER — HOSPITAL ENCOUNTER (OUTPATIENT)
Dept: GENERAL RADIOLOGY | Age: 57
Discharge: HOME | End: 2022-09-08
Payer: COMMERCIAL

## 2022-09-06 DIAGNOSIS — R05.9 COUGH: ICD-10-CM

## 2022-09-06 DIAGNOSIS — J45.41 MODERATE PERSISTENT ASTHMA WITH EXACERBATION: ICD-10-CM

## 2022-09-06 DIAGNOSIS — J45.41 MODERATE PERSISTENT ASTHMA WITH EXACERBATION: Primary | ICD-10-CM

## 2022-09-06 PROCEDURE — 71046 X-RAY EXAM CHEST 2 VIEWS: CPT

## 2022-09-08 ENCOUNTER — HOSPITAL ENCOUNTER (EMERGENCY)
Age: 57
Discharge: HOME | End: 2022-09-08
Attending: STUDENT IN AN ORGANIZED HEALTH CARE EDUCATION/TRAINING PROGRAM
Payer: COMMERCIAL

## 2022-09-08 VITALS
TEMPERATURE: 98.4 F | HEART RATE: 75 BPM | RESPIRATION RATE: 20 BRPM | SYSTOLIC BLOOD PRESSURE: 106 MMHG | OXYGEN SATURATION: 92 % | DIASTOLIC BLOOD PRESSURE: 61 MMHG

## 2022-09-08 DIAGNOSIS — J45.21 INTERMITTENT ASTHMA WITH ACUTE EXACERBATION, UNSPECIFIED ASTHMA SEVERITY: Primary | ICD-10-CM

## 2022-09-08 LAB
ANION GAP SERPL CALCULATED.3IONS-SCNC: 13 MMOL/L (ref 7–16)
BASOPHILS ABSOLUTE: 0.1 E9/L (ref 0–0.2)
BASOPHILS RELATIVE PERCENT: 1 % (ref 0–2)
BUN BLDV-MCNC: 23 MG/DL (ref 6–20)
CALCIUM SERPL-MCNC: 8.5 MG/DL (ref 8.6–10.2)
CHLORIDE BLD-SCNC: 100 MMOL/L (ref 98–107)
CO2: 26 MMOL/L (ref 22–29)
CREAT SERPL-MCNC: 1.1 MG/DL (ref 0.7–1.2)
EOSINOPHILS ABSOLUTE: 0.31 E9/L (ref 0.05–0.5)
EOSINOPHILS RELATIVE PERCENT: 3 % (ref 0–6)
GFR AFRICAN AMERICAN: >60
GFR NON-AFRICAN AMERICAN: >60 ML/MIN/1.73
GLUCOSE BLD-MCNC: 106 MG/DL (ref 74–99)
HCT VFR BLD CALC: 45.2 % (ref 37–54)
HEMOGLOBIN: 15.3 G/DL (ref 12.5–16.5)
LYMPHOCYTES ABSOLUTE: 3.61 E9/L (ref 1.5–4)
LYMPHOCYTES RELATIVE PERCENT: 35 % (ref 20–42)
MCH RBC QN AUTO: 30.9 PG (ref 26–35)
MCHC RBC AUTO-ENTMCNC: 33.8 % (ref 32–34.5)
MCV RBC AUTO: 91.3 FL (ref 80–99.9)
MONOCYTES ABSOLUTE: 0.62 E9/L (ref 0.1–0.95)
MONOCYTES RELATIVE PERCENT: 6 % (ref 2–12)
NEUTROPHILS ABSOLUTE: 5.67 E9/L (ref 1.8–7.3)
NEUTROPHILS RELATIVE PERCENT: 55 % (ref 43–80)
PDW BLD-RTO: 13.2 FL (ref 11.5–15)
PLATELET # BLD: 277 E9/L (ref 130–450)
PMV BLD AUTO: 9.7 FL (ref 7–12)
POTASSIUM REFLEX MAGNESIUM: 3.8 MMOL/L (ref 3.5–5)
PRO-BNP: 67 PG/ML (ref 0–125)
RBC # BLD: 4.95 E12/L (ref 3.8–5.8)
RBC # BLD: NORMAL 10*6/UL
SODIUM BLD-SCNC: 139 MMOL/L (ref 132–146)
TROPONIN, HIGH SENSITIVITY: 8 NG/L (ref 0–11)
TROPONIN, HIGH SENSITIVITY: 9 NG/L (ref 0–11)
WBC # BLD: 10.3 E9/L (ref 4.5–11.5)

## 2022-09-08 PROCEDURE — 6370000000 HC RX 637 (ALT 250 FOR IP): Performed by: STUDENT IN AN ORGANIZED HEALTH CARE EDUCATION/TRAINING PROGRAM

## 2022-09-08 PROCEDURE — 99284 EMERGENCY DEPT VISIT MOD MDM: CPT

## 2022-09-08 PROCEDURE — 96374 THER/PROPH/DIAG INJ IV PUSH: CPT

## 2022-09-08 PROCEDURE — 36415 COLL VENOUS BLD VENIPUNCTURE: CPT

## 2022-09-08 PROCEDURE — 85025 COMPLETE CBC W/AUTO DIFF WBC: CPT

## 2022-09-08 PROCEDURE — 84484 ASSAY OF TROPONIN QUANT: CPT

## 2022-09-08 PROCEDURE — 83880 ASSAY OF NATRIURETIC PEPTIDE: CPT

## 2022-09-08 PROCEDURE — 6360000002 HC RX W HCPCS: Performed by: STUDENT IN AN ORGANIZED HEALTH CARE EDUCATION/TRAINING PROGRAM

## 2022-09-08 PROCEDURE — 80048 BASIC METABOLIC PNL TOTAL CA: CPT

## 2022-09-08 PROCEDURE — 93005 ELECTROCARDIOGRAM TRACING: CPT | Performed by: STUDENT IN AN ORGANIZED HEALTH CARE EDUCATION/TRAINING PROGRAM

## 2022-09-08 PROCEDURE — 2580000003 HC RX 258: Performed by: STUDENT IN AN ORGANIZED HEALTH CARE EDUCATION/TRAINING PROGRAM

## 2022-09-08 RX ORDER — DOXYCYCLINE HYCLATE 100 MG
100 TABLET ORAL 2 TIMES DAILY
Qty: 14 TABLET | Refills: 0 | Status: SHIPPED | OUTPATIENT
Start: 2022-09-08 | End: 2022-09-15

## 2022-09-08 RX ORDER — 0.9 % SODIUM CHLORIDE 0.9 %
1000 INTRAVENOUS SOLUTION INTRAVENOUS ONCE
Status: COMPLETED | OUTPATIENT
Start: 2022-09-08 | End: 2022-09-08

## 2022-09-08 RX ORDER — PREDNISONE 20 MG/1
20 TABLET ORAL 2 TIMES DAILY
Qty: 15 TABLET | Refills: 0 | Status: SHIPPED | OUTPATIENT
Start: 2022-09-08 | End: 2022-09-18

## 2022-09-08 RX ORDER — IPRATROPIUM BROMIDE AND ALBUTEROL SULFATE 2.5; .5 MG/3ML; MG/3ML
3 SOLUTION RESPIRATORY (INHALATION) ONCE
Status: COMPLETED | OUTPATIENT
Start: 2022-09-08 | End: 2022-09-08

## 2022-09-08 RX ORDER — METHYLPREDNISOLONE SODIUM SUCCINATE 125 MG/2ML
125 INJECTION, POWDER, LYOPHILIZED, FOR SOLUTION INTRAMUSCULAR; INTRAVENOUS ONCE
Status: COMPLETED | OUTPATIENT
Start: 2022-09-08 | End: 2022-09-08

## 2022-09-08 RX ADMIN — METHYLPREDNISOLONE SODIUM SUCCINATE 125 MG: 125 INJECTION, POWDER, FOR SOLUTION INTRAMUSCULAR; INTRAVENOUS at 21:22

## 2022-09-08 RX ADMIN — IPRATROPIUM BROMIDE AND ALBUTEROL SULFATE 3 AMPULE: 2.5; .5 SOLUTION RESPIRATORY (INHALATION) at 21:35

## 2022-09-08 RX ADMIN — SODIUM CHLORIDE 1000 ML: 9 INJECTION, SOLUTION INTRAVENOUS at 21:59

## 2022-09-08 ASSESSMENT — PAIN SCALES - GENERAL: PAINLEVEL_OUTOF10: 5

## 2022-09-09 NOTE — ED NOTES
Fluids infusing with no complications. Pt receiving breathing tx at this time.       Laxmi Schulte RN  09/08/22 0982

## 2022-09-09 NOTE — ED PROVIDER NOTES
paravert facet joint, cerv/thorac, 1st level (Left, 11/1/2018); Nerve Block (Left, 01/24/2019); Anesthesia Nerve Block (Left, 1/24/2019); Total hip arthroplasty (Left, 10/02/2019); and Rotator cuff repair. Social History:  reports that he quit smoking about 14 years ago. His smoking use included cigarettes. He has a 5.00 pack-year smoking history. His smokeless tobacco use includes snuff. He reports that he does not drink alcohol and does not use drugs. Family History: family history is not on file. The patients home medications have been reviewed. Allergies: Patient has no known allergies. ---------------------------------------------------PHYSICAL EXAM--------------------------------------    Constitutional/General: AAO to person/place/time/purpose, NAD, no labored breathing, hoarse voice  Head: Normocephalic and atraumatic  Eyes: EOMI, conjunctiva normal, sclera non icteric  Mouth: Moist mucous membranes, uvula midline  Neck: Supple, no stridor, no meningeal signs  Respiratory: Scattered rhonchi bilaterally, scattered expiratory wheezes throughout bilateral lung fields, no tachypnea or respiratory distress, no accessory muscle use. Cardiovascular:  Regular rate. Regular rhythm. No murmurs, no gallops, or rubs. 2+ distal pulses. Equal extremity pulses. Chest: No chest wall tenderness or deformity  GI:  Abdomen Soft, Non tender, Non distended. No rebound, guarding, or rigidity. No pulsatile masses. Musculoskeletal: Moves all extremities x 4. Warm and well perfused, no clubbing, cyanosis, or edema. Capillary refill <3 seconds  Integument: skin warm and dry. No rashes.    Neurologic: GCS 15, no focal deficits, symmetric strength 5/5 in the major muscle groups of upper and lower extremities bilaterally  Psychiatric: Normal Affect      -----------------------------------------PROCEDURES----------------------------------------------------      -------------------------------------------------- RESULTS -------------------------------------------------  I have personally reviewed all laboratory and imaging results for this patient. Results are listed below.      LABS:  Results for orders placed or performed during the hospital encounter of 09/08/22   CBC with Auto Differential   Result Value Ref Range    WBC 10.3 4.5 - 11.5 E9/L    RBC 4.95 3.80 - 5.80 E12/L    Hemoglobin 15.3 12.5 - 16.5 g/dL    Hematocrit 45.2 37.0 - 54.0 %    MCV 91.3 80.0 - 99.9 fL    MCH 30.9 26.0 - 35.0 pg    MCHC 33.8 32.0 - 34.5 %    RDW 13.2 11.5 - 15.0 fL    Platelets 023 380 - 092 E9/L    MPV 9.7 7.0 - 12.0 fL    Neutrophils % 55.0 43.0 - 80.0 %    Lymphocytes % 35.0 20.0 - 42.0 %    Monocytes % 6.0 2.0 - 12.0 %    Eosinophils % 3.0 0.0 - 6.0 %    Basophils % 1.0 0.0 - 2.0 %    Neutrophils Absolute 5.67 1.80 - 7.30 E9/L    Lymphocytes Absolute 3.61 1.50 - 4.00 E9/L    Monocytes Absolute 0.62 0.10 - 0.95 E9/L    Eosinophils Absolute 0.31 0.05 - 0.50 E9/L    Basophils Absolute 0.10 0.00 - 0.20 E9/L    RBC Morphology Normal    Basic Metabolic Panel w/ Reflex to MG   Result Value Ref Range    Sodium 139 132 - 146 mmol/L    Potassium reflex Magnesium 3.8 3.5 - 5.0 mmol/L    Chloride 100 98 - 107 mmol/L    CO2 26 22 - 29 mmol/L    Anion Gap 13 7 - 16 mmol/L    Glucose 106 (H) 74 - 99 mg/dL    BUN 23 (H) 6 - 20 mg/dL    Creatinine 1.1 0.7 - 1.2 mg/dL    GFR Non-African American >60 >=60 mL/min/1.73    GFR African American >60     Calcium 8.5 (L) 8.6 - 10.2 mg/dL   Troponin   Result Value Ref Range    Troponin, High Sensitivity 9 0 - 11 ng/L   Brain Natriuretic Peptide   Result Value Ref Range    Pro-BNP 67 0 - 125 pg/mL   Troponin   Result Value Ref Range    Troponin, High Sensitivity 8 0 - 11 ng/L   EKG 12 Lead   Result Value Ref Range    Ventricular Rate 71 BPM    Atrial Rate 71 BPM    P-R Interval 182 ms    QRS Duration 106 ms    Q-T Interval 406 ms    QTc Calculation (Bazett) 441 ms    P Axis 29 degrees    R Axis -5 degrees    T Axis 18 degrees       RADIOLOGY:  Interpreted by Radiologist unless otherwise specified  No orders to display         EKG:    See ED Course for EKG documentation        ------------------------- NURSING NOTES AND VITALS REVIEWED ---------------------------   The nursing notes within the ED encounter and vital signs as below have been reviewed by myself. /61   Pulse 75   Temp 98.4 °F (36.9 °C) (Oral)   Resp 20   SpO2 92%   Oxygen Saturation Interpretation: Normal    The patients available past medical records and past encounters were reviewed. ------------------------------ ED COURSE/MEDICAL DECISION MAKING----------------------  Medications   methylPREDNISolone sodium (SOLU-MEDROL) injection 125 mg (125 mg IntraVENous Given 9/8/22 2122)   ipratropium-albuterol (DUONEB) nebulizer solution 3 ampule (3 ampules Inhalation Given 9/8/22 2135)   0.9 % sodium chloride bolus (0 mLs IntraVENous Stopped 9/8/22 2311)            Medical Decision Making: This is a 54-year-old male with history of asthma presents emerged department for shortness of breath, cough. Patient has a productive cough for the last couple days, just finished a course of prednisone 20 mg daily and a Z-Navjot. Patient follows with pulmonology closely. Patient with no leukocytosis, negative cardiac work-up, negative troponin and no acute ischemic changes on EKG. Patient normotensive, afebrile, not tachycardic, improved wheezing and air movement after duo nebs and IV steroids. Patient will be placed on steroid taper, given course of doxycycline given productive cough. Patient comfortable with this plan all questions were answered. See ED COURSE for additional MDM. Labs & imaging reviewed and interpreted, see RESULTS above. Re-Evaluations:             ED Course as of 09/09/22 0031   Thu Sep 08, 2022   2224 Patient reevaluated, sitting in bed comfortably.   Patient 96% SPO2 on room air, lungs without wheezing, states he feels much better. [RH]   2230 EKG: This EKG is signed and interpreted by me. Rate: 71  Rhythm: Sinus  Interpretation: no acute changes  Comparison: stable as compared to patient's most recent EKG on 1/17/21   [RH]      ED Course User Index  [RH] 600 JANETT Cormier DO         This patient's ED course included: a personal history and physicial examination, re-evaluation prior to disposition, multiple bedside re-evaluations, IV medications, cardiac monitoring, and continuous pulse oximetry    This patient has remained hemodynamically stable during their ED course. Consultations:  See ED Course    Counseling: The emergency provider has spoken with the patient and spouse/SO and discussed todays results, in addition to providing specific details for the plan of care and counseling regarding the diagnosis and prognosis. Questions are answered at this time and they are agreeable with the plan.       --------------------------------- IMPRESSION AND DISPOSITION ---------------------------------    IMPRESSION  1. Intermittent asthma with acute exacerbation, unspecified asthma severity        DISPOSITION  Disposition: Discharge to home  Patient condition is stable    NOTE: This report was transcribed using voice recognition software. Every effort was made to ensure accuracy; however, inadvertent computerized transcription errors may be present. Also please note that patient was seen and examined by attending physician. Plan of care and disposition discussed with attending physician and they were immediately available or present for all procedures performed.        -- Yanira Erwin D.O. PGY-3     Resident Physician     Emergency Medicine      9/9/2022 12:31 AM         600 JANETT Cormier DO  Resident  09/09/22 [id number]

## 2022-09-10 LAB
EKG ATRIAL RATE: 71 BPM
EKG P AXIS: 29 DEGREES
EKG P-R INTERVAL: 182 MS
EKG Q-T INTERVAL: 406 MS
EKG QRS DURATION: 106 MS
EKG QTC CALCULATION (BAZETT): 441 MS
EKG R AXIS: -5 DEGREES
EKG T AXIS: 18 DEGREES
EKG VENTRICULAR RATE: 71 BPM

## 2022-09-15 ENCOUNTER — OFFICE VISIT (OUTPATIENT)
Dept: FAMILY MEDICINE CLINIC | Age: 57
End: 2022-09-15
Payer: COMMERCIAL

## 2022-09-15 VITALS
SYSTOLIC BLOOD PRESSURE: 122 MMHG | WEIGHT: 237 LBS | TEMPERATURE: 97 F | BODY MASS INDEX: 35 KG/M2 | HEART RATE: 105 BPM | DIASTOLIC BLOOD PRESSURE: 86 MMHG | OXYGEN SATURATION: 98 %

## 2022-09-15 DIAGNOSIS — J44.9 CHRONIC OBSTRUCTIVE PULMONARY DISEASE, UNSPECIFIED COPD TYPE (HCC): ICD-10-CM

## 2022-09-15 DIAGNOSIS — R06.09 DOE (DYSPNEA ON EXERTION): ICD-10-CM

## 2022-09-15 DIAGNOSIS — E78.49 OTHER HYPERLIPIDEMIA: ICD-10-CM

## 2022-09-15 DIAGNOSIS — J45.41 MODERATE PERSISTENT ASTHMA WITH EXACERBATION: Primary | ICD-10-CM

## 2022-09-15 PROCEDURE — 99214 OFFICE O/P EST MOD 30 MIN: CPT | Performed by: INTERNAL MEDICINE

## 2022-09-15 RX ORDER — BENZONATATE 200 MG/1
CAPSULE ORAL
COMMUNITY
Start: 2022-09-04 | End: 2022-11-04

## 2022-09-15 ASSESSMENT — ENCOUNTER SYMPTOMS
COUGH: 1
EYE DISCHARGE: 0
NAUSEA: 0
EYE PAIN: 0
WHEEZING: 0
STRIDOR: 0
BLOOD IN STOOL: 0
SINUS PAIN: 0
ABDOMINAL PAIN: 0
SHORTNESS OF BREATH: 1
SORE THROAT: 0

## 2022-09-15 NOTE — ASSESSMENT & PLAN NOTE
Still not controlled well, but slow improvement    On Oral Steroid prednisone 20 bid , tapering dose.  S/b Dr Keagan Sagastume nebulizer  Doxy 100 mg bid

## 2022-09-15 NOTE — PROGRESS NOTES
Chief Complaint   Patient presents with    Asthma     Here for recheck on Asthma. Did see Dr Cassandra Gimenez last week. HPI:  Patient is here for follow-up     Has exac of Asthma, slowly feeling better    Still mild activities making hm tired and out of breath          Allergy and Medications are reviewed and updated. Past Medical History, Surgical History, and Family History has been reviewed and updated. Review of Systems:  Review of Systems   Constitutional:  Negative for chills and fever. HENT:  Positive for congestion. Negative for sinus pain and sore throat. Eyes:  Negative for pain and discharge. Respiratory:  Positive for cough and shortness of breath. Negative for wheezing and stridor. Cardiovascular:  Negative for chest pain. Gastrointestinal:  Negative for abdominal pain, blood in stool and nausea. Genitourinary:  Negative for flank pain and frequency. Musculoskeletal:  Negative for neck pain. Hematological:  Does not bruise/bleed easily. Psychiatric/Behavioral:  Negative for suicidal ideas. Vitals:    09/15/22 1359   BP: 122/86   Pulse: (!) 105   Temp: 97 °F (36.1 °C)   TempSrc: Infrared   SpO2: 98%   Weight: 237 lb (107.5 kg)       Physical Exam  Vitals reviewed. Constitutional:       Appearance: He is well-developed. HENT:      Head: Normocephalic and atraumatic. Eyes:      Conjunctiva/sclera: Conjunctivae normal.      Pupils: Pupils are equal, round, and reactive to light. Neck:      Vascular: No JVD. Cardiovascular:      Rate and Rhythm: Normal rate and regular rhythm. Pulmonary:      Effort: Pulmonary effort is normal.      Breath sounds: Normal breath sounds. Abdominal:      General: Bowel sounds are normal.      Palpations: Abdomen is soft. Musculoskeletal:         General: Normal range of motion. Skin:     General: Skin is warm and dry. Neurological:      Mental Status: He is alert and oriented to person, place, and time.    Psychiatric: Behavior: Behavior normal.        Labs :    Lab Results   Component Value Date    WBC 10.3 09/08/2022    HGB 15.3 09/08/2022    HCT 45.2 09/08/2022     09/08/2022    CHOL 236 (H) 01/02/2017    TRIG 202 (H) 01/02/2017    HDL 55 08/19/2022    ALT 45 (H) 08/19/2022    AST 34 08/19/2022     09/08/2022    K 3.8 09/08/2022     09/08/2022    CREATININE 1.1 09/08/2022    BUN 23 (H) 09/08/2022    CO2 26 09/08/2022    TSH 2.440 02/14/2022    PSA 0.58 02/14/2022    GLUF 87 08/19/2022     Lab Results   Component Value Date/Time    COLORU Yellow 02/14/2022 05:14 PM    NITRU Negative 02/14/2022 05:14 PM    GLUCOSEU Negative 02/14/2022 05:14 PM    KETUA Negative 02/14/2022 05:14 PM    UROBILINOGEN 0.2 02/14/2022 05:14 PM    BILIRUBINUR Negative 02/14/2022 05:14 PM     Lab Results   Component Value Date/Time    PSA 0.58 02/14/2022 01:06 PM             ASSESSMENT     Patient Active Problem List    Diagnosis Date Noted    Plantar fasciitis 05/26/2021    Pain in both feet 05/26/2021    Localized edema 05/26/2021    Difficulty walking 05/26/2021    COPD (chronic obstructive pulmonary disease) ([facility] [address] 01/18/2021     Assessment & Plan Note:      On trelegy  Duoneb inebulizer        Bipolar 1 disorder ([facility] [address]     Moderate persistent asthma with exacerbation 11/18/2019     Assessment & Plan Note:      Still not controlled well, but slow improvement    On Oral Steroid prednisone 20 bid , tapering dose.  S/b Dr Bren Hernandes nebulizer  Doxy 100 mg bid         History of total left hip replacement - 10/2/19 11/18/2019    Spondylarthrosis     Chronic pain syndrome 10/12/2018    Cervical disc disorder 10/12/2018    Cervical facet joint syndrome 10/12/2018    Spondylosis of cervical region without myelopathy or radiculopathy 10/12/2018    Neck pain 05/21/2018    Hyperlipemia 03/12/2018     Assessment & Plan Note:      Crestor 10 mg daily       Arthritis 03/12/2018    SELENA (obstructive sleep apnea) 03/12/2018    Wrist pain 12/15/2014 TFCC (triangular fibrocartilage complex) tear 12/15/2014    Elbow contusion 02/04/2014    Triceps tendon rupture 02/04/2014    Elbow pain 02/04/2014    Cubital tunnel syndrome 02/04/2014        Diagnosis:   1. Moderate persistent asthma with exacerbation  Assessment & Plan:   Still not controlled well, but slow improvement    On Oral Steroid prednisone 20 bid , tapering dose. S/b Dr Panda Valdez nebulizer  Doxy 100 mg bid     2. Other hyperlipidemia  Assessment & Plan:   Crestor 10 mg daily   3. Chronic obstructive pulmonary disease, unspecified COPD type ([facility] [address]  Assessment & Plan: On trelegy  Duoneb inebulizer    4. MEJIA (dyspnea on exertion)  Comments:  Check- Echo  Orders:  -     Echocardiogram complete; Future         PLAN:     Dr Ciara Velez- note reviewed    On Trelegy one puff daily    Sao2 -ok    Will check Echo    Pt is stable on current medical treatment. Continue current treatment plan    Side effects/Adverse effects/Precautions are reviewed with patient. Low salt, Low Carb diet an low fat diet  Continue medications as advised and take them regularly  Regular exercises as advised    Discussed natural and expected course of this diagnosis and need to alert me if symptoms do not follow expected course, or if any worse. Smoking cessation if applicable, discussed with patient. There are no Patient Instructions on file for this visit. Return for As Scheduled earlier.

## 2022-09-27 DIAGNOSIS — J44.9 CHRONIC OBSTRUCTIVE PULMONARY DISEASE, UNSPECIFIED COPD TYPE (HCC): ICD-10-CM

## 2022-09-27 DIAGNOSIS — J45.41 MODERATE PERSISTENT ASTHMA WITH EXACERBATION: ICD-10-CM

## 2022-09-27 RX ORDER — IPRATROPIUM BROMIDE AND ALBUTEROL SULFATE 2.5; .5 MG/3ML; MG/3ML
SOLUTION RESPIRATORY (INHALATION)
Qty: 360 ML | OUTPATIENT
Start: 2022-09-27

## 2022-10-09 ENCOUNTER — HOSPITAL ENCOUNTER (OUTPATIENT)
Age: 57
Discharge: HOME | End: 2022-10-09
Payer: COMMERCIAL

## 2022-10-09 LAB
BASOPHILS ABSOLUTE: 0.05 E9/L (ref 0–0.2)
BASOPHILS RELATIVE PERCENT: 1 % (ref 0–2)
EOSINOPHILS ABSOLUTE: 0.21 E9/L (ref 0.05–0.5)
EOSINOPHILS RELATIVE PERCENT: 4.2 % (ref 0–6)
HCT VFR BLD CALC: 44.6 % (ref 37–54)
HEMOGLOBIN: 15.2 G/DL (ref 12.5–16.5)
IMMATURE GRANULOCYTES #: 0.02 E9/L
IMMATURE GRANULOCYTES %: 0.4 % (ref 0–5)
LYMPHOCYTES ABSOLUTE: 1.64 E9/L (ref 1.5–4)
LYMPHOCYTES RELATIVE PERCENT: 32.6 % (ref 20–42)
MCH RBC QN AUTO: 30.5 PG (ref 26–35)
MCHC RBC AUTO-ENTMCNC: 34.1 % (ref 32–34.5)
MCV RBC AUTO: 89.6 FL (ref 80–99.9)
MONOCYTES ABSOLUTE: 0.6 E9/L (ref 0.1–0.95)
MONOCYTES RELATIVE PERCENT: 11.9 % (ref 2–12)
NEUTROPHILS ABSOLUTE: 2.51 E9/L (ref 1.8–7.3)
NEUTROPHILS RELATIVE PERCENT: 49.9 % (ref 43–80)
PDW BLD-RTO: 13.6 FL (ref 11.5–15)
PLATELET # BLD: 237 E9/L (ref 130–450)
PMV BLD AUTO: 10.3 FL (ref 7–12)
RBC # BLD: 4.98 E12/L (ref 3.8–5.8)
WBC # BLD: 5 E9/L (ref 4.5–11.5)

## 2022-10-09 PROCEDURE — 82785 ASSAY OF IGE: CPT

## 2022-10-09 PROCEDURE — 82103 ALPHA-1-ANTITRYPSIN TOTAL: CPT

## 2022-10-09 PROCEDURE — 85025 COMPLETE CBC W/AUTO DIFF WBC: CPT

## 2022-10-09 PROCEDURE — 36415 COLL VENOUS BLD VENIPUNCTURE: CPT

## 2022-10-12 LAB
ALPHA-1 ANTITRYPSIN: 113 MG/DL (ref 90–200)
IGE: 11 KU/L

## 2022-11-04 ENCOUNTER — OFFICE VISIT (OUTPATIENT)
Dept: FAMILY MEDICINE CLINIC | Age: 57
End: 2022-11-04
Payer: COMMERCIAL

## 2022-11-04 VITALS
DIASTOLIC BLOOD PRESSURE: 70 MMHG | TEMPERATURE: 97.3 F | HEART RATE: 78 BPM | WEIGHT: 240 LBS | BODY MASS INDEX: 35.55 KG/M2 | OXYGEN SATURATION: 95 % | HEIGHT: 69 IN | SYSTOLIC BLOOD PRESSURE: 112 MMHG

## 2022-11-04 DIAGNOSIS — Z09 HOSPITAL DISCHARGE FOLLOW-UP: ICD-10-CM

## 2022-11-04 DIAGNOSIS — J44.9 CHRONIC OBSTRUCTIVE PULMONARY DISEASE, UNSPECIFIED COPD TYPE (HCC): ICD-10-CM

## 2022-11-04 DIAGNOSIS — J45.41 MODERATE PERSISTENT ASTHMA WITH EXACERBATION: Primary | ICD-10-CM

## 2022-11-04 DIAGNOSIS — E78.49 OTHER HYPERLIPIDEMIA: ICD-10-CM

## 2022-11-04 DIAGNOSIS — U09.9 POST-COVID-19 CONDITION: ICD-10-CM

## 2022-11-04 PROCEDURE — 99214 OFFICE O/P EST MOD 30 MIN: CPT | Performed by: INTERNAL MEDICINE

## 2022-11-04 PROCEDURE — 1111F DSCHRG MED/CURRENT MED MERGE: CPT | Performed by: INTERNAL MEDICINE

## 2022-11-04 NOTE — PROGRESS NOTES
Post-Discharge Transitional Care Management Progress Note      Alesha Smith II   YOB: 1965    Date of Office Visit:  11/4/2022  Date of Hospital Admission: 10/31/22 - [address] [place] [address]   Date of Hospital Discharge: 11/2/22    Care management risk score Rising risk (score 2-5) and Complex Care (Scores >=6): No Risk Score On File     Non face to face  following discharge, date last encounter closed (first attempt may have been earlier): *No documented post hospital discharge outreach found in the last 14 days *No documented post hospital discharge outreach found in the last 14 days    Call initiated 2 business days of discharge: *No response recorded in the last 14 days    ASSESSMENT/PLAN:   Moderate persistent asthma with exacerbation  Chronic obstructive pulmonary disease, unspecified COPD type ([address]  Other hyperlipidemia  Post-COVID-19 condition  Hospital discharge follow-up  -     NC DISCHARGE MEDS RECONCILED W/ CURRENT OUTPATIENT MED LIST    Medical Decision Making: high complexity  Return in 3 months (on 2/9/2023). Subjective:   HPI:  Follow up of Hospital problems/diagnosis(es):   Pt was admitted to [address] [place] [address] via ER for chest pain,     Inpatient course: Discharge summary reviewed- see chart. Interval history/Current status:       Pt had stress test- abnormal, suppose to have Out pt - Card cath on Wednesday  Had chest pain and admitted last Monday , and cath done on Wednesday    Pt reports that cath is normal, no intervention needed, and d/c home same day  Inpatient course: Discharge summary reviewed- see chart.       Patient Active Problem List   Diagnosis    Elbow contusion    Triceps tendon rupture    Elbow pain    Cubital tunnel syndrome    Wrist pain    TFCC (triangular fibrocartilage complex) tear    Hyperlipemia    Arthritis    SELENA (obstructive sleep apnea)    Neck pain    Chronic pain syndrome    Cervical disc disorder    Cervical facet joint syndrome    Spondylosis of cervical region without myelopathy or radiculopathy    Spondylarthrosis    Moderate persistent asthma with exacerbation    History of total left hip replacement - 10/2/19    Bipolar 1 disorder (HCC)    Chronic obstructive pulmonary disease (HCC)    Plantar fasciitis    Pain in both feet    Localized edema    Difficulty walking       Medications listed as ordered at the time of discharge from hospital     Medication List            Accurate as of November 4, 2022  1:37 PM. If you have any questions, ask your nurse or doctor. CHANGE how you take these medications      esomeprazole 40 MG delayed release capsule  Commonly known as: NEXIUM  Take 1 capsule by mouth every morning (before breakfast)  What changed:   how much to take  additional instructions            CONTINUE taking these medications      albuterol sulfate  (90 Base) MCG/ACT inhaler  Commonly known as: PROVENTIL;VENTOLIN;PROAIR  Inhale 2 puffs into the lungs as needed for Wheezing     divalproex 500 MG extended release tablet  Commonly known as: DEPAKOTE ER     fluticasone 27.5 MCG/SPRAY nasal spray  Commonly known as: VERAMYST     ipratropium-albuterol 0.5-2.5 (3) MG/3ML Soln nebulizer solution  Commonly known as: DUONEB  Take 3 mLs by nebulization in the morning and 3 mLs at noon and 3 mLs in the evening and 3 mLs before bedtime.      QUEtiapine 50 MG tablet  Commonly known as: SEROQUEL     rosuvastatin 10 MG tablet  Commonly known as: Crestor  Take 1 tablet by mouth nightly     tadalafil 10 MG tablet  Commonly known as: CIALIS     Trelegy Ellipta 200-62.5-25 MCG/INH Aepb  Generic drug: Fluticasone-Umeclidin-Vilant            STOP taking these medications      benzonatate 200 MG capsule  Commonly known as: TESSALON  Stopped by: Willa Bautista MD     Breo Ellipta 200-25 MCG/INH Aepb inhaler  Generic drug: Fluticasone furoate-vilanterol  Stopped by: Willa Bautista MD                Medications marked \"taking\" at this time  Outpatient Medications Marked as Taking for the 11/4/22 encounter (Office Visit) with Arlen Felton MD   Medication Sig Dispense Refill    Fluticasone-Umeclidin-Vilant (TRELEGY ELLIPTA) 200-62.5-25 MCG/INH AEPB Inhale 1 puff into the lungs daily      ipratropium-albuterol (DUONEB) 0.5-2.5 (3) MG/3ML SOLN nebulizer solution Take 3 mLs by nebulization in the morning and 3 mLs at noon and 3 mLs in the evening and 3 mLs before bedtime. 120 each 0    albuterol sulfate HFA (PROVENTIL;VENTOLIN;PROAIR) 108 (90 Base) MCG/ACT inhaler Inhale 2 puffs into the lungs as needed for Wheezing 1 each 0    QUEtiapine (SEROQUEL) 50 MG tablet take 1 tablet by mouth once daily at bedtime      rosuvastatin (CRESTOR) 10 MG tablet Take 1 tablet by mouth nightly 90 tablet 0    esomeprazole (NEXIUM) 40 MG delayed release capsule Take 1 capsule by mouth every morning (before breakfast) (Patient taking differently: Take 20 mg by mouth every morning (before breakfast) 20mg daily) 90 capsule 1    divalproex (DEPAKOTE ER) 500 MG extended release tablet Take 1,000 mg by mouth nightly      tadalafil (CIALIS) 10 MG tablet TAKE 1 TABLET BY MOUTH AS NEEDED AN HOUR BEFORE SEX. DO NOT TAKE MORE THAN 1 TIME IN 24 HOURS. THIS IS NOT A DAILY MEDICINE. 1    fluticasone (VERAMYST) 27.5 MCG/SPRAY nasal spray 2 sprays by Nasal route daily as needed for Rhinitis           Medications patient taking as of now reconciled against medications ordered at time of hospital discharge: Yes        Objective:    /70 (Position: Sitting)   Pulse 78   Temp 97.3 °F (36.3 °C) (Temporal)   Ht 5' 9\" (1.753 m)   Wt 240 lb (108.9 kg)   SpO2 95%   BMI 35.44 kg/m²       An electronic signature was used to authenticate this note.   --Arlen Felton MD

## 2022-11-17 DIAGNOSIS — E78.49 OTHER HYPERLIPIDEMIA: ICD-10-CM

## 2022-11-17 RX ORDER — ROSUVASTATIN CALCIUM 10 MG/1
TABLET, COATED ORAL
Qty: 90 TABLET | Refills: 0 | OUTPATIENT
Start: 2022-11-17

## 2022-11-21 ENCOUNTER — TELEPHONE (OUTPATIENT)
Dept: FAMILY MEDICINE CLINIC | Age: 57
End: 2022-11-21

## 2022-11-21 NOTE — TELEPHONE ENCOUNTER
Bindu Wilcox wants to know if we could order a PFT on [facility] so all testing will be done when he sees Dr. Otoniel Hernandez.  Also can we put a referral in to see Dr. Otoniel Hernandez

## 2022-11-22 DIAGNOSIS — J45.41 MODERATE PERSISTENT ASTHMA WITH EXACERBATION: Primary | ICD-10-CM

## 2022-11-22 NOTE — TELEPHONE ENCOUNTER
Braeden Grimaldo MD  You 1 hour ago (11:30 AM)     VS  Referral and PFT orders are placed       Braeden Grimaldo MD  You 2 hours ago (9:50 AM)     VS  ordered      Ps wife informed

## 2022-12-12 DIAGNOSIS — J45.41 MODERATE PERSISTENT ASTHMA WITH EXACERBATION: Primary | ICD-10-CM

## 2022-12-12 DIAGNOSIS — R06.09 DOE (DYSPNEA ON EXERTION): ICD-10-CM

## 2022-12-14 ENCOUNTER — HOSPITAL ENCOUNTER (OUTPATIENT)
Dept: PULMONOLOGY | Age: 57
Discharge: HOME | End: 2022-12-14
Payer: COMMERCIAL

## 2022-12-14 DIAGNOSIS — J45.41 MODERATE PERSISTENT ASTHMA WITH EXACERBATION: ICD-10-CM

## 2022-12-14 PROCEDURE — 94729 DIFFUSING CAPACITY: CPT

## 2022-12-14 PROCEDURE — 94060 EVALUATION OF WHEEZING: CPT

## 2022-12-14 PROCEDURE — 94726 PLETHYSMOGRAPHY LUNG VOLUMES: CPT

## 2022-12-19 NOTE — PROCEDURES
[address]                               PULMONARY FUNCTION    PATIENT NAME: Elvis Aldana                   :        1965  MED REC NO:   [id number]                            ROOM:  ACCOUNT NO:   [de-identified]                           ADMIT DATE: 2022  PROVIDER:     Arti Mccoy MD    DATE OF PROCEDURE:  2022    ATTENDING:  Leti Muñoz MD    PULMONOLOGIST:  Arti Mccoy MD    In conclusion, this PFT did not reveal evidence of an obstruction. MVV  is normal.  No evidence of restriction or hyperinflation. The airway  resistance is normal.  Diffusion studies are normal.  Flow volume loop  is within normal limits. With the clinical presentation, asthma should  be included on differential diagnosis, and if there is no clinical  improvement, a methacholine challenge test may be useful.         Christin Dorado MD    D: 2022 6:50:23       T: 2022 12:15:46     [email or web address]  Job#: [id number]     Doc#: [id number]    CC:  Leti Muñoz MD

## 2022-12-27 ENCOUNTER — OFFICE VISIT (OUTPATIENT)
Dept: PULMONOLOGY | Age: 57
End: 2022-12-27
Payer: COMMERCIAL

## 2022-12-27 VITALS
HEIGHT: 69 IN | WEIGHT: 247 LBS | TEMPERATURE: 98.7 F | DIASTOLIC BLOOD PRESSURE: 78 MMHG | SYSTOLIC BLOOD PRESSURE: 124 MMHG | HEART RATE: 61 BPM | OXYGEN SATURATION: 93 % | BODY MASS INDEX: 36.58 KG/M2

## 2022-12-27 DIAGNOSIS — E66.9 OBESITY (BMI 35.0-39.9 WITHOUT COMORBIDITY): ICD-10-CM

## 2022-12-27 DIAGNOSIS — J45.40: ICD-10-CM

## 2022-12-27 DIAGNOSIS — J45.41 MODERATE PERSISTENT ASTHMA WITH EXACERBATION: Primary | ICD-10-CM

## 2022-12-27 DIAGNOSIS — Z57.9: ICD-10-CM

## 2022-12-27 LAB — PARTS PER BILLION: 8

## 2022-12-27 PROCEDURE — 99204 OFFICE O/P NEW MOD 45 MIN: CPT | Performed by: INTERNAL MEDICINE

## 2022-12-27 PROCEDURE — 99203 OFFICE O/P NEW LOW 30 MIN: CPT | Performed by: INTERNAL MEDICINE

## 2022-12-27 SDOH — HEALTH STABILITY - PHYSICAL HEALTH: OCCUPATIONAL EXPOSURE TO UNSPECIFIED RISK FACTOR: Z57.9

## 2022-12-27 ASSESSMENT — ENCOUNTER SYMPTOMS
COUGH: 1
EYES NEGATIVE: 1
GASTROINTESTINAL NEGATIVE: 1
SHORTNESS OF BREATH: 1
ALLERGIC/IMMUNOLOGIC NEGATIVE: 1
WHEEZING: 1

## 2022-12-27 NOTE — PROGRESS NOTES
Progress Note    Kerrie Arzola II  1965    CC: Asthma with exacerbation     HPI : 61year old male, history of sob, frequent cough and wheezing. He has been recurrent episodes like this and has been Trelegy inhaler and was on steroid injection and Pills three times this year. He is currently on Medrol dose pack. He was a smoker in the past, his mother had chronic cough, etiology unknown. His chest -xay, CBC and PFT were normal. His Alpha one antitrypsin level is normal. He  received steroid in March 2022, August 2022 and is currently on taking steroid. He had SARS Co-V 2 infection in the recent past and his symptoms got worse. He underwent cardiac cath  recently and it was unremarkable. 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 Past Medical History:   Diagnosis Date    Asthma     Bipolar 1 disorder ([facility]     Cardiomyopathy ([facility]     COPD (chronic obstructive pulmonary disease) ([facility] [facility] 1/18/2021    Depression     Hyperlipemia     Pneumonia due to COVID-19 virus 1/17/2021    TFCC (triangular fibrocartilage complex) tear 12/15/2014      Past Surgical History:   Procedure Laterality Date    ANESTHESIA NERVE BLOCK Left 1/24/2019    LEFT C3,4,5 MEDIAL BRANCH BLOCK performed by Paula Stubbs MD at [address]      inguinal and umbical    NERVE BLOCK Left 11/01/2018    medial branch block    NERVE BLOCK Left 01/24/2019    cervical mbb    OTHER SURGICAL HISTORY Right feb 2014    repair tricepts tondon    ND INJ DX/THER AGNT PARAVERT FACET JOINT, CERV/THORAC, 1ST LEVEL Left 11/1/2018    LEFT C3 C4 C5 MEDIAL BRANCH BLOCK performed by Paula Stubbs MD at [address] [facility] Left 10/02/2019    Dr Roselyn Moon Mery [address]       No family history on file.    Social History Socioeconomic History    Marital status:      Spouse name: None    Number of children: None    Years of education: None    Highest education level: None   Tobacco Use    Smoking status: Former     Packs/day: 0.50     Years: 10.00     Pack years: 5.00     Types: Cigarettes     Quit date: 2008     Years since quittin.9    Smokeless tobacco: Current     Types: Snuff    Tobacco comments:     Pt cutting down    Vaping Use    Vaping Use: Never used   Substance and Sexual Activity    Alcohol use: No    Drug use: No    Sexual activity: Yes     Partners: Female     Social Determinants of Health     Financial Resource Strain: Low Risk     Difficulty of Paying Living Expenses: Not hard at all   Food Insecurity: No Food Insecurity    Worried About Running Out of Food in the Last Year: Never true    Ran Out of Food in the Last Year: Never true        Patient has no known allergies. Current Outpatient Medications:     Fluticasone-Umeclidin-Vilant (TRELEGY ELLIPTA) 200-62.5-25 MCG/INH AEPB, Inhale 1 puff into the lungs daily, Disp: , Rfl:     QUEtiapine (SEROQUEL) 50 MG tablet, take 1 tablet by mouth once daily at bedtime, Disp: , Rfl:     esomeprazole (NEXIUM) 40 MG delayed release capsule, Take 1 capsule by mouth every morning (before breakfast) (Patient taking differently: Take 20 mg by mouth every morning (before breakfast) 20mg daily), Disp: 90 capsule, Rfl: 1    divalproex (DEPAKOTE ER) 500 MG extended release tablet, Take 1,000 mg by mouth nightly, Disp: , Rfl:     tadalafil (CIALIS) 10 MG tablet, TAKE 1 TABLET BY MOUTH AS NEEDED AN HOUR BEFORE SEX. DO NOT TAKE MORE THAN 1 TIME IN 24 HOURS.  THIS IS NOT A DAILY MEDICINE., Disp: , Rfl: 1    fluticasone (VERAMYST) 27.5 MCG/SPRAY nasal spray, 2 sprays by Nasal route daily as needed for Rhinitis , Disp: , Rfl:     ipratropium-albuterol (DUONEB) 0.5-2.5 (3) MG/3ML SOLN nebulizer solution, Take 3 mLs by nebulization in the morning and 3 mLs at noon and 3 mLs in the evening and 3 mLs before bedtime. , Disp: 120 each, Rfl: 0    albuterol sulfate HFA (PROVENTIL;VENTOLIN;PROAIR) 108 (90 Base) MCG/ACT inhaler, Inhale 2 puffs into the lungs as needed for Wheezing, Disp: 1 each, Rfl: 0    rosuvastatin (CRESTOR) 10 MG tablet, Take 1 tablet by mouth nightly, Disp: 90 tablet, Rfl: 0     I reviewed the patient's past medical and surgical history, Medications and Allergies. Patient seen today for: COPD (joey)       Review of Systems   Constitutional: Negative. HENT: Negative. Eyes: Negative. Respiratory:  Positive for cough, shortness of breath and wheezing. Cardiovascular: Negative. Gastrointestinal: Negative. Endocrine: Negative. Genitourinary: Negative. Musculoskeletal: Negative. Allergic/Immunologic: Negative. Neurological: Negative. Physical Exam:  Vitals:    12/27/22 1452   BP: 124/78   Pulse: 61   Temp: 98.7 °F (37.1 °C)   SpO2: 93%   Weight: 247 lb (112 kg)   Height: 5' 9\" (1.753 m)       Physical Exam  Constitutional:       Appearance: Normal appearance. He is obese. HENT:      Head: Normocephalic and atraumatic. Nose: Nose normal.   Eyes:      Pupils: Pupils are equal, round, and reactive to light. Cardiovascular:      Rate and Rhythm: Normal rate and regular rhythm. Pulses: Normal pulses. Heart sounds: No murmur heard. No gallop. Pulmonary:      Effort: Pulmonary effort is normal.      Breath sounds: Normal breath sounds. No wheezing, rhonchi or rales. Abdominal:      General: Abdomen is flat. Palpations: Abdomen is soft. Musculoskeletal:         General: Normal range of motion. Cervical back: Normal range of motion and neck supple. Skin:     General: Skin is warm. Neurological:      General: No focal deficit present. Mental Status: He is alert. Diagnosis Orders   1.  Moderate persistent asthma with exacerbation  Nitric oxide    History of steroid use three times this year and he usually responds to it. DDX: Lore Veliz but after discuusint with him and his wife, agreed to use Dupixeant       2. Obesity (BMI 35.0-39.9 without comorbidity)      Advised to olse weight. 3. Occupational asthma, moderate persistent, uncomplicated      His co workers has no respiratory symptoms. It is possible his work envorenment makes his symptoms worse. Plan; Dupixeant Starting dose 600 mg and then 300 mg every other week. Follow up in 3 months.      Electronically by Yoan Hollis MD  on 12/27/22 at 3:05 PM EST

## 2022-12-29 DIAGNOSIS — J45.41 MODERATE PERSISTENT ASTHMA WITH EXACERBATION: ICD-10-CM

## 2022-12-29 RX ORDER — ALBUTEROL SULFATE 90 UG/1
2 AEROSOL, METERED RESPIRATORY (INHALATION) PRN
Qty: 6.7 EACH | OUTPATIENT
Start: 2022-12-29 | End: 2023-03-29

## 2023-01-04 DIAGNOSIS — J84.9 INTERSTITIAL LUNG DISEASE (HCC): Primary | ICD-10-CM

## 2023-01-05 ENCOUNTER — TELEPHONE (OUTPATIENT)
Dept: PULMONOLOGY | Age: 58
End: 2023-01-05

## 2023-01-05 NOTE — TELEPHONE ENCOUNTER
Mailed letter to patient regarding his CT Chest. It is scheduled for January 26 @ 7:00 am. Please arrive at 6:30 am. There is no prep for this test.

## 2023-01-06 ENCOUNTER — OFFICE VISIT (OUTPATIENT)
Dept: FAMILY MEDICINE CLINIC | Age: 58
End: 2023-01-06
Payer: COMMERCIAL

## 2023-01-06 VITALS
SYSTOLIC BLOOD PRESSURE: 110 MMHG | DIASTOLIC BLOOD PRESSURE: 60 MMHG | TEMPERATURE: 98.6 F | HEIGHT: 69 IN | WEIGHT: 247 LBS | RESPIRATION RATE: 18 BRPM | BODY MASS INDEX: 36.58 KG/M2 | OXYGEN SATURATION: 92 % | HEART RATE: 106 BPM

## 2023-01-06 DIAGNOSIS — R52 BODY ACHES: ICD-10-CM

## 2023-01-06 DIAGNOSIS — U07.1 COVID-19: Primary | ICD-10-CM

## 2023-01-06 LAB
INFLUENZA A ANTIBODY: NEGATIVE
INFLUENZA B ANTIBODY: NEGATIVE
Lab: ABNORMAL
PERFORMING INSTRUMENT: ABNORMAL
QC PASS/FAIL: ABNORMAL
SARS-COV-2, POC: DETECTED

## 2023-01-06 PROCEDURE — 87426 SARSCOV CORONAVIRUS AG IA: CPT

## 2023-01-06 PROCEDURE — 99213 OFFICE O/P EST LOW 20 MIN: CPT

## 2023-01-06 PROCEDURE — 87804 INFLUENZA ASSAY W/OPTIC: CPT

## 2023-01-06 RX ORDER — AZITHROMYCIN 250 MG/1
TABLET, FILM COATED ORAL
Qty: 6 TABLET | Refills: 0 | Status: SHIPPED | OUTPATIENT
Start: 2023-01-06

## 2023-01-06 RX ORDER — METHYLPREDNISOLONE 4 MG/1
TABLET ORAL
Qty: 1 KIT | Refills: 0 | Status: SHIPPED | OUTPATIENT
Start: 2023-01-06

## 2023-01-06 NOTE — PROGRESS NOTES
Chief Complaint       Generalized Body Aches (Chills, body aches, head cold. Started about a couple days ago. )      History of Present Illness   Source of history provided by:  patient. Yomi Faust is a 60 y.o. old male presenting to the walk in clinic for evaluation of body aches and chills. Denies any SOB, CP, dyspnea, LE edema, abdominal pain, vomiting, rash, or lethargy. Pt hx of asthma or COPD. Patient denies recent sick exposures. Patient has been vaccinated for COVID-19. Patient has been taking tylenol OTC without symptomatic relief. ROS    Unless otherwise stated in this report or unable to obtain because of the patient's clinical or mental status as evidenced by the medical record, this patients's positive and negative responses for Review of Systems, constitutional, psych, eyes, ENT, cardiovascular, respiratory, gastrointestinal, neurological, genitourinary, musculoskeletal, integument systems and systems related to the presenting problem are either stated in the preceding or were not pertinent or were negative for the symptoms and/or complaints related to the medical problem. Physical Exam         VS:  /60   Pulse (!) 106   Temp 98.6 °F (37 °C)   Resp 18   Ht 5' 9\" (1.753 m)   Wt 247 lb (112 kg)   SpO2 92%   BMI 36.48 kg/m²    Oxygen Saturation Interpretation: Normal.    Constitutional:  Alert, development consistent with age. NAD. Head:  NC/NT. Airway patent. Right ear erythematous. Mouth: Posterior pharynx with mild erythema and clear postnasal drip. No tonsillar hypertrophy or exudate. Neck:  Normal ROM. Supple. No anterior cervical adenopathy noted. Lungs: CTAB without wheezes, rales, or rhonchi. CV:  Regular rate and rhythm, normal heart sounds, without pathological murmurs, ectopy, gallops, or rubs. Skin:  Normal turgor. Warm, dry, without visible rash. Lymphatic: No lymphangitis or adenopathy noted. Neurological:  Oriented.   Motor functions intact. Lab / Imaging Results   (All laboratory and radiology results have been personally reviewed by myself)  Labs:  Results for orders placed or performed in visit on 01/06/23   POCT COVID-19, Antigen   Result Value Ref Range    SARS-COV-2, POC Detected (A) Not Detected    Lot Number [id number]     QC Pass/Fail PASS     Performing Instrument BD Veritor    POCT Influenza A/B   Result Value Ref Range    Influenza A Ab Negative     Influenza B Ab Negative        Imaging: All Radiology results interpreted by Radiologist unless otherwise noted. Assessment / Plan     Impression(s):  Saroj Cunningham was seen today for generalized body aches. Diagnoses and all orders for this visit:    COVID-19    Body aches  -     POCT COVID-19, Antigen  -     POCT Influenza A/B  -     azithromycin (ZITHROMAX Z-AZUCENA) 250 MG tablet; Take 2 tabs on day one, then 1 tab daily for the next 4 days    Other orders  -     methylPREDNISolone (MEDROL DOSEPACK) 4 MG tablet; Take by mouth. Disposition:  Disposition: Discharge to home. Rapid COVID-19 testing is Positive in office. Pt should remain out of public for at least 5 days from the start of symptoms. Additional 5 days out in public fully masked. Pt should also be fever free for 24 hours and symptoms should be improved overall prior to returning. Increase fluids and rest. Symptomatic relief discussed including Tylenol prn pain/fever. Schedule f/u with PCP in 7-10 days if symptoms persist. ED sooner if symptoms worsen or change. ED immediately with high or refractory fever, progressive SOB, dyspnea, CP, calf pain/swelling, shaking chills, vomiting, abdominal pain, lethargy, flank pain, or decreased urinary output. Pt verbalizes understanding and is in agreement with plan of care. All questions answered. Eugenia Contreras, APRN - CNP    **This report was transcribed using voice recognition software.  Every effort was made to ensure accuracy; however, inadvertent computerized transcription errors may be present.

## 2023-01-10 ENCOUNTER — TELEPHONE (OUTPATIENT)
Dept: FAMILY MEDICINE CLINIC | Age: 58
End: 2023-01-10

## 2023-01-10 DIAGNOSIS — B99.9 RECURRENT INFECTIONS: Primary | ICD-10-CM

## 2023-01-10 NOTE — TELEPHONE ENCOUNTER
Emili spoke with Dr. Vianney Rios he would like us to place a referral for infectious disease at [facility] for Immunity to be checked.

## 2023-01-11 ENCOUNTER — OFFICE VISIT (OUTPATIENT)
Dept: PULMONOLOGY | Age: 58
End: 2023-01-11
Payer: COMMERCIAL

## 2023-01-11 ENCOUNTER — NURSE ONLY (OUTPATIENT)
Dept: PULMONOLOGY | Age: 58
End: 2023-01-11

## 2023-01-11 VITALS
SYSTOLIC BLOOD PRESSURE: 117 MMHG | OXYGEN SATURATION: 96 % | DIASTOLIC BLOOD PRESSURE: 80 MMHG | WEIGHT: 242 LBS | BODY MASS INDEX: 35.84 KG/M2 | HEART RATE: 85 BPM | HEIGHT: 69 IN | TEMPERATURE: 97.2 F

## 2023-01-11 DIAGNOSIS — J45.901 ASTHMA DEPENDENT ON SYSTEMIC STEROIDS WITH ACUTE EXACERBATION: Primary | ICD-10-CM

## 2023-01-11 DIAGNOSIS — D83.9 CVID (COMMON VARIABLE IMMUNODEFICIENCY) (HCC): ICD-10-CM

## 2023-01-11 DIAGNOSIS — J44.9 CHRONIC OBSTRUCTIVE PULMONARY DISEASE, UNSPECIFIED COPD TYPE (HCC): ICD-10-CM

## 2023-01-11 DIAGNOSIS — U07.1 COVID: ICD-10-CM

## 2023-01-11 DIAGNOSIS — Z79.52 ASTHMA DEPENDENT ON SYSTEMIC STEROIDS WITH ACUTE EXACERBATION: Primary | ICD-10-CM

## 2023-01-11 DIAGNOSIS — E78.49 OTHER HYPERLIPIDEMIA: ICD-10-CM

## 2023-01-11 DIAGNOSIS — J45.41 MODERATE PERSISTENT ASTHMA WITH EXACERBATION: ICD-10-CM

## 2023-01-11 DIAGNOSIS — J45.901 EXACERBATION OF ASTHMA, UNSPECIFIED ASTHMA SEVERITY, UNSPECIFIED WHETHER PERSISTENT: Primary | ICD-10-CM

## 2023-01-11 PROCEDURE — 99214 OFFICE O/P EST MOD 30 MIN: CPT | Performed by: INTERNAL MEDICINE

## 2023-01-11 NOTE — PROGRESS NOTES
Progress Note    Alesha Smith II  1965    CC: Asthma (Exacerbation asthma)       Asthma  His past medical history is significant for asthma.  : History of recurrent COVID infection, he received Vaccine with booster and he contracted COVID again, he has been on Oral steroid and bronchodilator but he took Zithromax. He is waiting for Dupixenabt injection for recurrent asthma attack requiring Systemic steroid. History of sob, recurrent non productive cough and on and off wheezing. Past Medical History:   Diagnosis Date    Asthma     Bipolar 1 disorder ([facility] [address]     Cardiomyopathy ([facility] [address]     COPD (chronic obstructive pulmonary disease) ([facility] [address] 2021    Depression     Hyperlipemia     Pneumonia due to COVID-19 virus 2021    TFCC (triangular fibrocartilage complex) tear 12/15/2014      Past Surgical History:   Procedure Laterality Date    ANESTHESIA NERVE BLOCK Left 2019    LEFT C3,4,5 MEDIAL BRANCH BLOCK performed by Marina Pereyra MD [address]      inguinal and umbical    NERVE BLOCK Left 2018    medial branch block    NERVE BLOCK Left 2019    cervical mbb    OTHER SURGICAL HISTORY Right 2014    repair tricepts tondon    [place] NJX DX/THER AGT PVRT FACET JT CRV/THRC 1 LEVEL Left 2018    LEFT C3 C4 C5 MEDIAL BRANCH BLOCK performed by Marina Pereyra MD at [address] [place] [address] Left 10/02/2019    Dr Mary Beth Marcelo [address] [place] [address]       No family history on file.    Social History     Socioeconomic History    Marital status:      Spouse name: None    Number of children: None    Years of education: None    Highest education level: None   Tobacco Use    Smoking status: Former     Packs/day: 0.50     Years: 10.00     Pack years: 5.00     Types: Cigarettes     Quit date: 2008     Years since quittin.9    Smokeless tobacco: Current     Types: Snuff    Tobacco comments:     Pt cutting down    Vaping Use    Vaping Use: Never used   Substance and Sexual Activity    Alcohol use: No    Drug use: No    Sexual activity: Yes     Partners: Female     Social Determinants of Health     Financial Resource Strain: Low Risk     Difficulty of Paying Living Expenses: Not hard at all   Food Insecurity: No Food Insecurity    Worried About Running Out of Food in the Last Year: Never true    Ran Out of Food in the Last Year: Never true        Patient has no known allergies. Current Outpatient Medications:     methylPREDNISolone (MEDROL DOSEPACK) 4 MG tablet, Take by mouth., Disp: 1 kit, Rfl: 0    Fluticasone-Umeclidin-Vilant (TRELEGY ELLIPTA) 200-62.5-25 MCG/INH AEPB, Inhale 1 puff into the lungs daily, Disp: , Rfl:     QUEtiapine (SEROQUEL) 50 MG tablet, take 1 tablet by mouth once daily at bedtime, Disp: , Rfl:     esomeprazole (NEXIUM) 40 MG delayed release capsule, Take 1 capsule by mouth every morning (before breakfast) (Patient taking differently: Take 20 mg by mouth every morning (before breakfast) 20mg daily), Disp: 90 capsule, Rfl: 1    divalproex (DEPAKOTE ER) 500 MG extended release tablet, Take 1,000 mg by mouth nightly, Disp: , Rfl:     tadalafil (CIALIS) 10 MG tablet, TAKE 1 TABLET BY MOUTH AS NEEDED AN HOUR BEFORE SEX. DO NOT TAKE MORE THAN 1 TIME IN 24 HOURS.  THIS IS NOT A DAILY MEDICINE., Disp: , Rfl: 1    fluticasone (VERAMYST) 27.5 MCG/SPRAY nasal spray, 2 sprays by Nasal route daily as needed for Rhinitis , Disp: , Rfl:     azithromycin (ZITHROMAX Z-AZUCENA) 250 MG tablet, Take 2 tabs on day one, then 1 tab daily for the next 4 days (Patient not taking: Reported on 1/11/2023), Disp: 6 tablet, Rfl: 0    dupilumab (DUPIXENT) 300 MG/2ML SOSY injection, Inject 2 mLs into the skin every 14 days (Patient not taking: Reported on 1/11/2023), Disp: 20 mL, Rfl: 3    ipratropium-albuterol (DUONEB) 0.5-2.5 (3) MG/3ML SOLN nebulizer solution, Take 3 mLs by nebulization in the morning and 3 mLs at noon and 3 mLs in the evening and 3 mLs before bedtime. , Disp: 120 each, Rfl: 0    albuterol sulfate HFA (PROVENTIL;VENTOLIN;PROAIR) 108 (90 Base) MCG/ACT inhaler, Inhale 2 puffs into the lungs as needed for Wheezing, Disp: 1 each, Rfl: 0    rosuvastatin (CRESTOR) 10 MG tablet, Take 1 tablet by mouth nightly, Disp: 90 tablet, Rfl: 0     I reviewed the patient's past medical and surgical history, Medications and Allergies. Patient seen today for: Asthma (Exacerbation asthma)       Review of Systems      Physical Exam:  Vitals:    01/11/23 1418   BP: 117/80   Pulse: 85   Temp: 97.2 °F (36.2 °C)   SpO2: 96%   Weight: 242 lb (109.8 kg)   Height: 5' 9\" (1.753 m)       Physical Exam  Constitutional:       Appearance: Normal appearance. HENT:      Head: Normocephalic. Nose: Nose normal.      Mouth/Throat:      Mouth: Mucous membranes are moist.   Eyes:      Pupils: Pupils are equal, round, and reactive to light. Cardiovascular:      Rate and Rhythm: Normal rate and regular rhythm. Pulses: Normal pulses. Pulmonary:      Effort: Pulmonary effort is normal.      Breath sounds: Normal breath sounds. Abdominal:      General: Abdomen is flat. Musculoskeletal:      Cervical back: Normal range of motion. Neurological:      General: No focal deficit present. Mental Status: He is alert. Diagnosis Orders   1. Asthma dependent on systemic steroids with acute exacerbation        2. COVID          Chest x-ray and immunoglobulin levels ordered. Jaime Canas is pending. Continue Trelegy inhaler. Follow up in 3 months.       Electronically by Saeed Hinojosa MD  on 1/11/23 at 2:27 PM EST

## 2023-01-11 NOTE — TELEPHONE ENCOUNTER
Patients wife called wanting a refill on Duoneb for her  also wanted to know if you would put him on a higher dose of prednisone     Last seen 9/15/2022  Next appt 2/22/2023     [facility]

## 2023-01-11 NOTE — PROGRESS NOTES
Patient to follow up with physician in 3 months. Orders for chest x-ray and blood work were given to the patient to get at his convenience.

## 2023-01-12 ENCOUNTER — TELEMEDICINE (OUTPATIENT)
Dept: FAMILY MEDICINE CLINIC | Age: 58
End: 2023-01-12
Payer: COMMERCIAL

## 2023-01-12 DIAGNOSIS — J45.41 MODERATE PERSISTENT ASTHMA WITH EXACERBATION: Primary | ICD-10-CM

## 2023-01-12 DIAGNOSIS — J44.9 CHRONIC OBSTRUCTIVE PULMONARY DISEASE, UNSPECIFIED COPD TYPE (HCC): ICD-10-CM

## 2023-01-12 DIAGNOSIS — U09.9 POST-COVID-19 CONDITION: ICD-10-CM

## 2023-01-12 DIAGNOSIS — E78.49 OTHER HYPERLIPIDEMIA: ICD-10-CM

## 2023-01-12 LAB
IGA: 97 MG/DL (ref 70–400)
IGG: 840 MG/DL (ref 700–1600)
IGM: 47 MG/DL (ref 40–230)

## 2023-01-12 PROCEDURE — 99214 OFFICE O/P EST MOD 30 MIN: CPT | Performed by: INTERNAL MEDICINE

## 2023-01-12 RX ORDER — IPRATROPIUM BROMIDE AND ALBUTEROL SULFATE 2.5; .5 MG/3ML; MG/3ML
1 SOLUTION RESPIRATORY (INHALATION) EVERY 6 HOURS
Qty: 120 EACH | Refills: 0 | Status: SHIPPED | OUTPATIENT
Start: 2023-01-12 | End: 2023-04-12

## 2023-01-12 RX ORDER — ROSUVASTATIN CALCIUM 10 MG/1
10 TABLET, COATED ORAL NIGHTLY
Qty: 90 TABLET | Refills: 1 | Status: SHIPPED | OUTPATIENT
Start: 2023-01-12 | End: 2023-04-12

## 2023-01-12 RX ORDER — PREDNISONE 20 MG/1
20 TABLET ORAL 2 TIMES DAILY
Qty: 10 TABLET | Refills: 0 | Status: SHIPPED | OUTPATIENT
Start: 2023-01-12 | End: 2023-01-17

## 2023-01-12 ASSESSMENT — PATIENT HEALTH QUESTIONNAIRE - PHQ9
8. MOVING OR SPEAKING SO SLOWLY THAT OTHER PEOPLE COULD HAVE NOTICED. OR THE OPPOSITE, BEING SO FIGETY OR RESTLESS THAT YOU HAVE BEEN MOVING AROUND A LOT MORE THAN USUAL: 0
6. FEELING BAD ABOUT YOURSELF - OR THAT YOU ARE A FAILURE OR HAVE LET YOURSELF OR YOUR FAMILY DOWN: 0
SUM OF ALL RESPONSES TO PHQ QUESTIONS 1-9: 0
SUM OF ALL RESPONSES TO PHQ QUESTIONS 1-9: 0
9. THOUGHTS THAT YOU WOULD BE BETTER OFF DEAD, OR OF HURTING YOURSELF: 0
10. IF YOU CHECKED OFF ANY PROBLEMS, HOW DIFFICULT HAVE THESE PROBLEMS MADE IT FOR YOU TO DO YOUR WORK, TAKE CARE OF THINGS AT HOME, OR GET ALONG WITH OTHER PEOPLE: 0
SUM OF ALL RESPONSES TO PHQ QUESTIONS 1-9: 0
3. TROUBLE FALLING OR STAYING ASLEEP: 0
SUM OF ALL RESPONSES TO PHQ9 QUESTIONS 1 & 2: 0
4. FEELING TIRED OR HAVING LITTLE ENERGY: 0
1. LITTLE INTEREST OR PLEASURE IN DOING THINGS: 0
SUM OF ALL RESPONSES TO PHQ QUESTIONS 1-9: 0
5. POOR APPETITE OR OVEREATING: 0
7. TROUBLE CONCENTRATING ON THINGS, SUCH AS READING THE NEWSPAPER OR WATCHING TELEVISION: 0
2. FEELING DOWN, DEPRESSED OR HOPELESS: 0

## 2023-01-12 ASSESSMENT — ENCOUNTER SYMPTOMS
BLOOD IN STOOL: 0
EYE DISCHARGE: 0
ABDOMINAL PAIN: 0
SHORTNESS OF BREATH: 0
COUGH: 1
NAUSEA: 0

## 2023-01-12 NOTE — PROGRESS NOTES
TeleMedicine Video Visit    Hong Marmolejo II, was evaluated through a synchronous (real-time) audio-video encounter. The patient (or guardian if applicable) is aware that this is a billable service. , which includes applicable co-pays. This virtual visit was conducted with the patient's  (and/or legal guardian's) consent. The visit was conducted pursuant to the emergency declaration under the [address] [place] [address], [address] [place] [address] authority and the [employer] Act. Patient identification was verified, and a caregiver was present when appropriate. The patient was located in a state where the provider was licensed to provide care. Patient identification was verified at the start of the visit, including the patient's telephone number and physical location. I discussed with the patient the nature of our telehealth visits, that:     Due to the nature of an audio- video modality, the only components of a physical exam that could be done are the elements supported by direct observation. I would evaluate the patient and recommend diagnostics and treatments based on my assessment. If it was felt that the patient should be evaluated in clinic or an emergency room setting, then they would be directed there. Our sessions are not being recorded and that personal health information is protected. Our team would provide follow up care in person if/when the patient needs it. Patient's location: home address in [address]  other people involved in call  - Wife [address]  Chief Complaint   Patient presents with    Asthma     Asthma with exacerbation x  1 week, pt completed medrol dose pack, Z-pack still no better. Coughing, Shortness of breath.  Needs short term disability papers completed from 01/06/2023       Medication Refill       HPI:  Patient is here for follow-up on his asthma nad recent covid    Pt had positive covid last week, Yesterday tested Neg at home - per pt    Has exacerbation of Asthma  Treated with Medrol dose pack and still not much better    Seen by Dr Otoniel Hernandez yesterday     His Dupixent medicine is pending from his mail in pharmacy     SaO2 > 91 at home     Allergy and Medications are reviewed and updated. Past Medical History, Surgical History, and Family History has been reviewed and updated. Review of Systems:  Review of Systems   Constitutional:  Negative for chills and fever. HENT:  Positive for congestion. Negative for ear pain. Eyes:  Negative for discharge. Respiratory:  Positive for cough. Negative for shortness of breath (No new SOB). Cardiovascular:  Negative for chest pain and leg swelling. Gastrointestinal:  Negative for abdominal pain, blood in stool and nausea. Endocrine: Negative for polydipsia. Genitourinary:  Negative for flank pain and hematuria. Musculoskeletal:  Negative for myalgias and neck pain. Skin:  Negative for rash. Neurological:  Negative for dizziness and seizures. Hematological:  Does not bruise/bleed easily. Psychiatric/Behavioral:  Negative for hallucinations and suicidal ideas. There were no vitals filed for this visit. Physical Exam  Vitals reviewed: Virtual visit. Denies Fever, . Constitutional:       Appearance: Normal appearance. He is not ill-appearing or diaphoretic. HENT:      Head: Normocephalic and atraumatic. Nose: Nose normal.   Eyes:      General:         Right eye: No discharge. Left eye: No discharge. Conjunctiva/sclera: Conjunctivae normal.   Pulmonary:      Effort: Pulmonary effort is normal. No respiratory distress. Skin:     General: Skin is dry. Coloration: Skin is not jaundiced. Neurological:      General: No focal deficit present. Mental Status: He is alert and oriented to person, place, and time. Mental status is at baseline.    Psychiatric:         Mood and Affect: Mood normal.         Behavior: Behavior normal.         Thought Content: Thought content normal.        Labs :    Lab Results   Component Value Date    WBC 5.0 10/09/2022    HGB 15.2 10/09/2022    HCT 44.6 10/09/2022     10/09/2022    CHOL 236 (H) 01/02/2017    TRIG 202 (H) 01/02/2017    HDL 55 08/19/2022    ALT 45 (H) 08/19/2022    AST 34 08/19/2022     09/08/2022    K 3.8 09/08/2022     09/08/2022    CREATININE 1.1 09/08/2022    BUN 23 (H) 09/08/2022    CO2 26 09/08/2022    TSH 2.440 02/14/2022    PSA 0.58 02/14/2022    GLUF 87 08/19/2022     Lab Results   Component Value Date/Time    COLORU Yellow 02/14/2022 05:14 PM    NITRU Negative 02/14/2022 05:14 PM    GLUCOSEU Negative 02/14/2022 05:14 PM    KETUA Negative 02/14/2022 05:14 PM    UROBILINOGEN 0.2 02/14/2022 05:14 PM    BILIRUBINUR Negative 02/14/2022 05:14 PM     Lab Results   Component Value Date/Time    PSA 0.58 02/14/2022 01:06 PM             ASSESSMENT     Patient Active Problem List    Diagnosis Date Noted    Plantar fasciitis 05/26/2021    Pain in both feet 05/26/2021    Localized edema 05/26/2021    Difficulty walking 05/26/2021    Chronic obstructive pulmonary disease ([address] 01/18/2021    Bipolar 1 disorder (HCC)     Moderate persistent asthma with exacerbation 11/18/2019    History of total left hip replacement - 10/2/19 11/18/2019    Spondylarthrosis     Chronic pain syndrome 10/12/2018    Cervical disc disorder 10/12/2018    Cervical facet joint syndrome 10/12/2018    Spondylosis of cervical region without myelopathy or radiculopathy 10/12/2018    Neck pain 05/21/2018    Hyperlipemia 03/12/2018    Arthritis 03/12/2018    SELENA (obstructive sleep apnea) 03/12/2018    Wrist pain 12/15/2014    TFCC (triangular fibrocartilage complex) tear 12/15/2014    Elbow contusion 02/04/2014    Triceps tendon rupture 02/04/2014    Elbow pain 02/04/2014    Cubital tunnel syndrome 02/04/2014        Diagnosis:   1.  Moderate persistent asthma with exacerbation  -     predniSONE (DELTASONE) 20 MG tablet; Take 1 tablet by mouth 2 times daily for 5 days, Disp-10 tablet, R-0Normal  2. Other hyperlipidemia  -     rosuvastatin (CRESTOR) 10 MG tablet; Take 1 tablet by mouth nightly, Disp-90 tablet, R-1Normal  3. Chronic obstructive pulmonary disease, unspecified COPD type ([facility]  4. Post-COVID-19 condition         PLAN:       Add Prednisone 20 mg bid x 5 days    Has cough Syrup    Monitor SaO2    Pt is stable on current medical treatment. Continue current treatment plan    Side effects/Adverse effects/Precautions are reviewed with patient. Low salt, Low Carb diet an low fat diet  Continue medications as advised and take them regularly  Regular exercises as advised    Discussed natural and expected course of this diagnosis and need to alert me if symptoms do not follow expected course, or if any worse. Smoking cessation if applicable, discussed with patient. There are no Patient Instructions on file for this visit. Return for As Scheduled earlier. Not billed by time\",  This visit was completed virtually using [employer]. me

## 2023-01-14 DIAGNOSIS — J45.41 MODERATE PERSISTENT ASTHMA WITH EXACERBATION: ICD-10-CM

## 2023-01-16 RX ORDER — ALBUTEROL SULFATE 90 UG/1
2 AEROSOL, METERED RESPIRATORY (INHALATION) PRN
Qty: 6.7 EACH | OUTPATIENT
Start: 2023-01-16 | End: 2023-04-16

## 2023-01-18 DIAGNOSIS — J45.909 ASTHMA CASE MANAGEMENT PATIENT: Primary | ICD-10-CM

## 2023-01-25 ENCOUNTER — OFFICE VISIT (OUTPATIENT)
Dept: FAMILY MEDICINE CLINIC | Age: 58
End: 2023-01-25
Payer: COMMERCIAL

## 2023-01-25 VITALS
BODY MASS INDEX: 35.13 KG/M2 | OXYGEN SATURATION: 95 % | WEIGHT: 237.2 LBS | SYSTOLIC BLOOD PRESSURE: 112 MMHG | HEART RATE: 80 BPM | DIASTOLIC BLOOD PRESSURE: 70 MMHG | HEIGHT: 69 IN | TEMPERATURE: 97.4 F

## 2023-01-25 DIAGNOSIS — J45.41 MODERATE PERSISTENT ASTHMA WITH EXACERBATION: ICD-10-CM

## 2023-01-25 DIAGNOSIS — J44.9 CHRONIC OBSTRUCTIVE PULMONARY DISEASE, UNSPECIFIED COPD TYPE (HCC): ICD-10-CM

## 2023-01-25 DIAGNOSIS — E78.49 OTHER HYPERLIPIDEMIA: ICD-10-CM

## 2023-01-25 DIAGNOSIS — U09.9 POST-COVID-19 CONDITION: Primary | ICD-10-CM

## 2023-01-25 DIAGNOSIS — Z23 FLU VACCINE NEED: ICD-10-CM

## 2023-01-25 PROCEDURE — 99214 OFFICE O/P EST MOD 30 MIN: CPT | Performed by: INTERNAL MEDICINE

## 2023-01-25 PROCEDURE — 90471 IMMUNIZATION ADMIN: CPT | Performed by: INTERNAL MEDICINE

## 2023-01-25 PROCEDURE — 90674 CCIIV4 VAC NO PRSV 0.5 ML IM: CPT | Performed by: INTERNAL MEDICINE

## 2023-01-25 ASSESSMENT — ENCOUNTER SYMPTOMS
ABDOMINAL PAIN: 0
NAUSEA: 0
EYE DISCHARGE: 0
BLOOD IN STOOL: 0
SHORTNESS OF BREATH: 0

## 2023-01-25 NOTE — PROGRESS NOTES
Chief Complaint   Patient presents with    Cough     C/o Lingering cough, seeing Infectious Disease on 02/24/2023, Immunology and allergy in [facility] on 02/13/2023    Health Maintenance     Shingles vaccine, Flu vaccine        HPI:  Patient is here for follow-up     Lingering cough - slowly improving      Allergy and Medications are reviewed and updated. Past Medical History, Surgical History, and Family History has been reviewed and updated. Review of Systems:  Review of Systems   Constitutional:  Negative for chills and fever. HENT:  Negative for congestion and ear pain. Eyes:  Negative for discharge. Respiratory:  Negative for shortness of breath (No new SOB). Cardiovascular:  Negative for chest pain and leg swelling. Gastrointestinal:  Negative for abdominal pain, blood in stool and nausea. Endocrine: Negative for polydipsia. Genitourinary:  Negative for flank pain and hematuria. Musculoskeletal:  Negative for myalgias and neck pain. Skin:  Negative for rash. Neurological:  Negative for dizziness and seizures. Hematological:  Does not bruise/bleed easily. Psychiatric/Behavioral:  Negative for hallucinations and suicidal ideas. Vitals:    01/25/23 1625   BP: 112/70   Position: Sitting   Pulse: 80   Temp: 97.4 °F (36.3 °C)   TempSrc: Temporal   SpO2: 95%   Weight: 237 lb 3.2 oz (107.6 kg)   Height: 5' 9\" (1.753 m)       Physical Exam  Vitals reviewed. Constitutional:       Appearance: He is well-developed. HENT:      Head: Normocephalic and atraumatic. Eyes:      Conjunctiva/sclera: Conjunctivae normal.      Pupils: Pupils are equal, round, and reactive to light. Neck:      Vascular: No JVD. Cardiovascular:      Rate and Rhythm: Normal rate and regular rhythm. Pulmonary:      Effort: Pulmonary effort is normal.      Breath sounds: Normal breath sounds. Abdominal:      General: Bowel sounds are normal.      Palpations: Abdomen is soft.    Musculoskeletal: General: Normal range of motion. Skin:     General: Skin is warm and dry. Neurological:      Mental Status: He is alert and oriented to person, place, and time.    Psychiatric:         Behavior: Behavior normal.        Labs :    Lab Results   Component Value Date    WBC 5.0 10/09/2022    HGB 15.2 10/09/2022    HCT 44.6 10/09/2022     10/09/2022    CHOL 236 (H) 01/02/2017    TRIG 202 (H) 01/02/2017    HDL 55 08/19/2022    ALT 45 (H) 08/19/2022    AST 34 08/19/2022     09/08/2022    K 3.8 09/08/2022     09/08/2022    CREATININE 1.1 09/08/2022    BUN 23 (H) 09/08/2022    CO2 26 09/08/2022    TSH 2.440 02/14/2022    PSA 0.58 02/14/2022    GLUF 87 08/19/2022     Lab Results   Component Value Date/Time    COLORU Yellow 02/14/2022 05:14 PM    NITRU Negative 02/14/2022 05:14 PM    GLUCOSEU Negative 02/14/2022 05:14 PM    KETUA Negative 02/14/2022 05:14 PM    UROBILINOGEN 0.2 02/14/2022 05:14 PM    BILIRUBINUR Negative 02/14/2022 05:14 PM     Lab Results   Component Value Date/Time    PSA 0.58 02/14/2022 01:06 PM             ASSESSMENT     Patient Active Problem List    Diagnosis Date Noted    Plantar fasciitis 05/26/2021    Pain in both feet 05/26/2021    Localized edema 05/26/2021    Difficulty walking 05/26/2021    Chronic obstructive pulmonary disease ([facility] 01/18/2021    Bipolar 1 disorder (HCC)     Moderate persistent asthma with exacerbation 11/18/2019    History of total left hip replacement - 10/2/19 11/18/2019    Spondylarthrosis     Chronic pain syndrome 10/12/2018    Cervical disc disorder 10/12/2018    Cervical facet joint syndrome 10/12/2018    Spondylosis of cervical region without myelopathy or radiculopathy 10/12/2018    Neck pain 05/21/2018    Hyperlipemia 03/12/2018    Arthritis 03/12/2018    SELENA (obstructive sleep apnea) 03/12/2018    Wrist pain 12/15/2014    TFCC (triangular fibrocartilage complex) tear 12/15/2014    Elbow contusion 02/04/2014    Triceps tendon rupture 02/04/2014 Elbow pain 02/04/2014    Cubital tunnel syndrome 02/04/2014        Diagnosis:   1. Post-COVID-19 condition  2. Moderate persistent asthma with exacerbation  3. Other hyperlipidemia  -     Comprehensive Metabolic Panel, Fasting; Future  -     Lipid, Fasting; Future  -     TSH; Future  4. Chronic obstructive pulmonary disease, unspecified COPD type ([facility]       PLAN:       pt has an appt with [facility] as above     Also will see ID , may at [facility]    Advise to have ( Fasting) lab test prior to next visit. Pt is stable on current medical treatment. Continue current treatment plan    Side effects/Adverse effects/Precautions are reviewed with patient. Low salt, Low Carb diet an low fat diet  Continue medications as advised and take them regularly  Regular exercises as advised    Discussed natural and expected course of this diagnosis and need to alert me if symptoms do not follow expected course, or if any worse. Smoking cessation if applicable, discussed with patient. Patient Instructions   The medication list included in this document is our record of what you are currently taking, including any changes that were made at today's visit. If you find any differences when compared to your medications at home, or have any questions that were not answered at your visit, please contact the office. Advise to have ( Fasting) lab test prior to next visit. Return in about 12 weeks (around 4/19/2023).

## 2023-01-26 ENCOUNTER — HOSPITAL ENCOUNTER (OUTPATIENT)
Dept: CT IMAGING | Age: 58
Discharge: HOME | End: 2023-01-26
Payer: COMMERCIAL

## 2023-01-26 DIAGNOSIS — J84.9 INTERSTITIAL LUNG DISEASE (HCC): ICD-10-CM

## 2023-01-26 PROCEDURE — 71250 CT THORAX DX C-: CPT

## 2023-01-30 ENCOUNTER — TELEPHONE (OUTPATIENT)
Dept: FAMILY MEDICINE CLINIC | Age: 58
End: 2023-01-30

## 2023-01-30 DIAGNOSIS — B99.9 RECURRENT INFECTIONS: ICD-10-CM

## 2023-01-30 DIAGNOSIS — U09.9 POST-COVID-19 CONDITION: Primary | ICD-10-CM

## 2023-01-30 NOTE — TELEPHONE ENCOUNTER
Caroline Barnard MD  You 52 minutes ago (2:02 PM)     VS  ok        Patients wife informed referral placed

## 2023-01-30 NOTE — TELEPHONE ENCOUNTER
Emili would like a referral placed for V[facility] infectious disease with [facility]     Last seen 11/4/2022  Next appt Visit date 02/22/2023

## 2023-02-06 DIAGNOSIS — J45.41 MODERATE PERSISTENT ASTHMA WITH EXACERBATION: ICD-10-CM

## 2023-02-06 DIAGNOSIS — J44.9 CHRONIC OBSTRUCTIVE PULMONARY DISEASE, UNSPECIFIED COPD TYPE (HCC): ICD-10-CM

## 2023-02-06 RX ORDER — IPRATROPIUM BROMIDE AND ALBUTEROL SULFATE 2.5; .5 MG/3ML; MG/3ML
SOLUTION RESPIRATORY (INHALATION)
Qty: 360 ML | OUTPATIENT
Start: 2023-02-06

## 2023-03-10 ENCOUNTER — HOSPITAL ENCOUNTER (OUTPATIENT)
Dept: PULMONOLOGY | Age: 58
Discharge: HOME | End: 2023-03-10
Payer: COMMERCIAL

## 2023-03-10 DIAGNOSIS — J45.909 ASTHMA CASE MANAGEMENT PATIENT: ICD-10-CM

## 2023-03-10 PROCEDURE — 94070 EVALUATION OF WHEEZING: CPT

## 2023-03-27 ENCOUNTER — OFFICE VISIT (OUTPATIENT)
Dept: PULMONOLOGY | Age: 58
End: 2023-03-27
Payer: COMMERCIAL

## 2023-03-27 VITALS
WEIGHT: 229 LBS | HEART RATE: 66 BPM | TEMPERATURE: 98.6 F | SYSTOLIC BLOOD PRESSURE: 102 MMHG | HEIGHT: 69 IN | BODY MASS INDEX: 33.92 KG/M2 | OXYGEN SATURATION: 97 % | DIASTOLIC BLOOD PRESSURE: 73 MMHG

## 2023-03-27 DIAGNOSIS — U09.9 LONG COVID: Primary | ICD-10-CM

## 2023-03-27 PROCEDURE — 99213 OFFICE O/P EST LOW 20 MIN: CPT | Performed by: INTERNAL MEDICINE

## 2023-03-27 NOTE — PROGRESS NOTES
Progress Note    Lola Baron II  1965     CC:   Results of CT, PFT and bllod tests. Asthma  His past medical history is significant for asthma.  :Patient had COVID pneumonia ion Jan. 2021, he recovered from it and then had recurrent Covid infection, treated as an out patient. He was sob, had frequent cough but he is symptom less. He had Methacholine test and it was negative. Alpha 1 Antitrypsin test, IGE Absolute eosinophil count were rubio. Follow up chest CT showed great improvement, some residual GGO on right side. 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 He quit using inhalers. Past Medical History:   Diagnosis Date    Asthma     Bipolar 1 disorder ([facility]     Cardiomyopathy ([facility]     COPD (chronic obstructive pulmonary disease) ([facility] 1/18/2021    Depression     Hyperlipemia     Pneumonia due to COVID-19 virus 1/17/2021    TFCC (triangular fibrocartilage complex) tear 12/15/2014      Past Surgical History:   Procedure Laterality Date    ANESTHESIA NERVE BLOCK Left 1/24/2019    LEFT C3,4,5 MEDIAL BRANCH BLOCK [facility]      inguinal and umbical    NERVE BLOCK Left 11/01/2018    medial branch block    NERVE BLOCK Left 01/24/2019    cervical mbb    OTHER SURGICAL HISTORY Right feb 2014    repair tricepts tondon    MS NJX DX/THER AGT PVRT FACET JT CRV/THRC 1 LEVEL Left 11/1/2018    LEFT C3 C4 C5 MEDIAL BRANCH BLOCK performed by Christianne Valdez MD [address] [facility] Left 10/02/2019    Dr Emili Del Rosario [facility]       No family history on file.    Social History     Socioeconomic History    Marital status:      Spouse name: None    Number of children: None    Years of education: None    Highest education level: None   Tobacco Use    Smoking status: Former     Packs/day: 0.50

## 2023-04-23 ENCOUNTER — HOSPITAL ENCOUNTER (OUTPATIENT)
Age: 58
Discharge: HOME | End: 2023-04-23
Payer: COMMERCIAL

## 2023-04-23 DIAGNOSIS — E78.49 OTHER HYPERLIPIDEMIA: ICD-10-CM

## 2023-04-23 LAB
ALBUMIN SERPL-MCNC: 4.4 G/DL (ref 3.5–5.2)
ALP SERPL-CCNC: 64 U/L (ref 40–129)
ALT SERPL-CCNC: 20 U/L (ref 0–40)
ANION GAP SERPL CALCULATED.3IONS-SCNC: 8 MMOL/L (ref 7–16)
AST SERPL-CCNC: 20 U/L (ref 0–39)
BILIRUB SERPL-MCNC: 0.3 MG/DL (ref 0–1.2)
BUN SERPL-MCNC: 17 MG/DL (ref 6–20)
CALCIUM SERPL-MCNC: 9 MG/DL (ref 8.6–10.2)
CHLORIDE SERPL-SCNC: 103 MMOL/L (ref 98–107)
CHOLESTEROL, FASTING: 214 MG/DL (ref 0–199)
CO2 SERPL-SCNC: 28 MMOL/L (ref 22–29)
CREAT SERPL-MCNC: 1 MG/DL (ref 0.7–1.2)
GLUCOSE FASTING: 84 MG/DL (ref 74–99)
HDLC SERPL-MCNC: 61 MG/DL
LDL CHOLESTEROL CALCULATED: 128 MG/DL (ref 0–99)
POTASSIUM SERPL-SCNC: 4.6 MMOL/L (ref 3.5–5)
PROT SERPL-MCNC: 6.8 G/DL (ref 6.4–8.3)
SODIUM SERPL-SCNC: 139 MMOL/L (ref 132–146)
TRIGLYCERIDE, FASTING: 123 MG/DL (ref 0–149)
TSH SERPL-MCNC: 2 UIU/ML (ref 0.27–4.2)
VALPROATE SERPL-MCNC: 67 MCG/ML (ref 50–100)
VLDLC SERPL CALC-MCNC: 25 MG/DL

## 2023-04-23 PROCEDURE — 36415 COLL VENOUS BLD VENIPUNCTURE: CPT

## 2023-04-23 PROCEDURE — 80164 ASSAY DIPROPYLACETIC ACD TOT: CPT

## 2023-04-23 PROCEDURE — 80061 LIPID PANEL: CPT

## 2023-04-23 PROCEDURE — 84443 ASSAY THYROID STIM HORMONE: CPT

## 2023-04-23 PROCEDURE — 80053 COMPREHEN METABOLIC PANEL: CPT

## 2023-04-24 ENCOUNTER — OFFICE VISIT (OUTPATIENT)
Dept: FAMILY MEDICINE CLINIC | Age: 58
End: 2023-04-24
Payer: COMMERCIAL

## 2023-04-24 VITALS
BODY MASS INDEX: 34.27 KG/M2 | OXYGEN SATURATION: 97 % | SYSTOLIC BLOOD PRESSURE: 116 MMHG | HEIGHT: 69 IN | TEMPERATURE: 97.4 F | DIASTOLIC BLOOD PRESSURE: 74 MMHG | HEART RATE: 81 BPM | WEIGHT: 231.4 LBS

## 2023-04-24 DIAGNOSIS — E78.49 OTHER HYPERLIPIDEMIA: Primary | ICD-10-CM

## 2023-04-24 DIAGNOSIS — U09.9 CHRONIC POST-COVID-19 SYNDROME: ICD-10-CM

## 2023-04-24 PROCEDURE — 99214 OFFICE O/P EST MOD 30 MIN: CPT | Performed by: INTERNAL MEDICINE

## 2023-04-24 RX ORDER — ROSUVASTATIN CALCIUM 10 MG/1
10 TABLET, COATED ORAL NIGHTLY
Qty: 90 TABLET | Refills: 1 | Status: SHIPPED | OUTPATIENT
Start: 2023-04-24 | End: 2023-07-23

## 2023-04-24 SDOH — ECONOMIC STABILITY: INCOME INSECURITY: HOW HARD IS IT FOR YOU TO PAY FOR THE VERY BASICS LIKE FOOD, HOUSING, MEDICAL CARE, AND HEATING?: NOT HARD AT ALL

## 2023-04-24 SDOH — ECONOMIC STABILITY: FOOD INSECURITY: WITHIN THE PAST 12 MONTHS, THE FOOD YOU BOUGHT JUST DIDN'T LAST AND YOU DIDN'T HAVE MONEY TO GET MORE.: NEVER TRUE

## 2023-04-24 SDOH — ECONOMIC STABILITY: HOUSING INSECURITY
IN THE LAST 12 MONTHS, WAS THERE A TIME WHEN YOU DID NOT HAVE A STEADY PLACE TO SLEEP OR SLEPT IN A SHELTER (INCLUDING NOW)?: NO

## 2023-04-24 SDOH — ECONOMIC STABILITY: FOOD INSECURITY: WITHIN THE PAST 12 MONTHS, YOU WORRIED THAT YOUR FOOD WOULD RUN OUT BEFORE YOU GOT MONEY TO BUY MORE.: NEVER TRUE

## 2023-04-24 ASSESSMENT — ENCOUNTER SYMPTOMS
BLOOD IN STOOL: 0
SHORTNESS OF BREATH: 0
NAUSEA: 0
ABDOMINAL PAIN: 0
EYE DISCHARGE: 0

## 2023-04-24 NOTE — PROGRESS NOTES
Skin:     General: Skin is warm and dry. Neurological:      Mental Status: He is alert and oriented to person, place, and time.    Psychiatric:         Behavior: Behavior normal.        Labs :    Lab Results   Component Value Date    WBC 5.0 10/09/2022    HGB 15.2 10/09/2022    HCT 44.6 10/09/2022     10/09/2022    CHOL 236 (H) 01/02/2017    TRIG 202 (H) 01/02/2017    HDL 61 04/23/2023    ALT 20 04/23/2023    AST 20 04/23/2023     04/23/2023    K 4.6 04/23/2023     04/23/2023    CREATININE 1.0 04/23/2023    BUN 17 04/23/2023    CO2 28 04/23/2023    TSH 2.000 04/23/2023    PSA 0.58 02/14/2022    GLUF 84 04/23/2023     Lab Results   Component Value Date/Time    COLORU Yellow 02/14/2022 05:14 PM    NITRU Negative 02/14/2022 05:14 PM    GLUCOSEU Negative 02/14/2022 05:14 PM    KETUA Negative 02/14/2022 05:14 PM    UROBILINOGEN 0.2 02/14/2022 05:14 PM    BILIRUBINUR Negative 02/14/2022 05:14 PM     Lab Results   Component Value Date/Time    PSA 0.58 02/14/2022 01:06 PM             ASSESSMENT     Patient Active Problem List    Diagnosis Date Noted    Plantar fasciitis 05/26/2021    Pain in both feet 05/26/2021    Localized edema 05/26/2021    Difficulty walking 05/26/2021    Chronic obstructive pulmonary disease ([address] 01/18/2021    Bipolar 1 disorder (HCC)     Moderate persistent asthma with exacerbation 11/18/2019    History of total left hip replacement - 10/2/19 11/18/2019    Spondylarthrosis     Chronic pain syndrome 10/12/2018    Cervical disc disorder 10/12/2018    Cervical facet joint syndrome 10/12/2018    Spondylosis of cervical region without myelopathy or radiculopathy 10/12/2018    Neck pain 05/21/2018    Hyperlipemia 03/12/2018    Arthritis 03/12/2018    SELENA (obstructive sleep apnea) 03/12/2018    Wrist pain 12/15/2014    TFCC (triangular fibrocartilage complex) tear 12/15/2014    Elbow contusion 02/04/2014    Triceps tendon rupture 02/04/2014    Elbow pain 02/04/2014    Cubital tunnel

## 2023-07-06 NOTE — PROGRESS NOTES
Addended byOGLA LIDIA Knowles on: 7/6/2023 12:10 PM     Modules accepted: Orders Admitting Date and Time: 1/17/2021  4:45 PM  Admit Dx: Pneumonia due to COVID-19 virus [U07.1, J12.82]    Subjective:    Still Leiva but improved. still hoarse  Per RN: no issues. Patient says rn says no thrush    ROS: denies fever, chills, cp, sob, n/v, HA unless stated above.      methylPREDNISolone  20 mg Intravenous Q12H    Fluticasone furoate-vilanterol  1 puff Inhalation Daily    divalproex  500 mg Oral QAM    divalproex  1,000 mg Oral Nightly    pantoprazole  40 mg Oral QAM AC    QUEtiapine  50 mg Oral Nightly    sodium chloride flush  10 mL Intravenous 2 times per day    enoxaparin  40 mg Subcutaneous Daily    famotidine  20 mg Oral BID         benzonatate, 100 mg, TID PRN      diphenhydrAMINE, 25 mg, Q6H PRN      sodium chloride, 30 mL, PRN      albuterol sulfate HFA, 2 puff, PRN      sodium chloride flush, 10 mL, PRN      promethazine, 12.5 mg, Q6H PRN    Or      ondansetron, 4 mg, Q6H PRN      polyethylene glycol, 17 g, Daily PRN      acetaminophen, 650 mg, Q6H PRN    Or      acetaminophen, 650 mg, Q6H PRN         Objective:    /67   Pulse 81   Temp 97.3 °F (36.3 °C) (Infrared)   Resp 15   Ht 5' 9\" (1.753 m)   Wt 222 lb (100.7 kg)   SpO2 92%   BMI 32.78 kg/m²   General Appearance: alert and oriented to person, place and time and in no acute distress  Skin: warm and dry  Head: normocephalic and atraumatic  Eyes: pupils equal, round, and reactive to light, extraocular eye movements intact, conjunctivae normal  Neck: neck supple and non tender without mass   Pulmonary/Chest: clear to auscultation bilaterally- no wheezes, rales or rhonchi, normal air movement, no respiratory distress  Cardiovascular: normal rate, normal S1 and S2 and no carotid bruits  Abdomen: soft, non-tender, non-distended, normal bowel sounds, no masses or organomegaly  Extremities: no cyanosis, no clubbing and no edema  Neurologic: no cranial nerve deficit and speech normal      Recent Labs 01/20/21  0831 01/21/21  0707    139   K 4.4 5.6*   CL 98 98   CO2 28 32*   BUN 21* 21*   CREATININE 0.9 0.9   GLUCOSE 105* 90   CALCIUM 9.1 9.1       Recent Labs     01/20/21  0831 01/21/21  0707   ALKPHOS 145* 138*   PROT 7.4 7.1   LABALBU 3.7 3.5   BILITOT 0.4 0.3   AST 58* 34   * 94*       No results for input(s): WBC, RBC, HGB, HCT, MCV, MCH, MCHC, RDW, PLT, MPV in the last 72 hours. Radiology:   CTA PULMONARY W CONTRAST   Final Result   No evidence of pulmonary embolism. Diffuse and extensive bilateral patchy infiltrates, compatible with atypical,   viral and COVID pneumonia. Assessment:  Active Problems:    Pneumonia due to COVID-19 virus    Bipolar 1 disorder (HCC)    COPD (chronic obstructive pulmonary disease) ([facility])  Resolved Problems:    * No resolved hospital problems. *      Plan:  Presented with worsening Shortness of Breath, Cough for last few days prior to arrival to the hospital. SOB is associated with some cough, nonproductive but no fever or chills reported. Initially SOB was mild, intermittent but gradually getting more persistent. Started gradually. Exacerbated by general activity or exertion. Relieved only partially by rest.       1. Pneumonia + acute Hypoxic respiratory failure+ Covid19 infection. Although there are some criteria of sepsis due to these diagnosises, he is not clinically septic. Imaging suggestive of pneumonia  Septic workup with cultures in progress. Blood cultures will be 48 hours old overnight on 1/19. So far negative. Remdesivir 1st course. Finished deacron Sunday am  1/17. It was started from ED visit, then had flu clinic followup. Solu-Medrol 40 IV twice daily on 1/18 decreased to 30 mg IV twice daily on 1/19, then 20 mg bid on 1/21  Humidified Oxygen:  1/18: 10 liters  1/19 3 liters  1/20-21: 2 liters   1/21: room air when at rest, navarro with activity    2. COPD, treat as if in acute exacerbation.   Continue Inhaled steroid + Albuterol PRN inhalation and PRN Duoneb  3 Bipolar Mood disorder: stable with no acute changes to home meds  4. No change to outpatient treatment regimen for the existing stable combordities including:  Cardiomyopathy ([address] Hyperlipemia. Note h/o Triangular fibrocartilage complex tear. 5. Disposition: Home/primary residence on 1/23   6 Full code  7. DVT Prophylaxis : SC Lovenox as  All questions were answered to patient's satisfaction and verbalized understanding    NOTE: This report was transcribed using voice recognition software. Every effort was made to ensure accuracy; however, inadvertent computerized transcription errors may be present.      Electronically signed by Joey Heller MD on 1/22/2021 at 10:51 AM

## 2023-07-18 ENCOUNTER — OFFICE VISIT (OUTPATIENT)
Dept: ORTHOPEDIC SURGERY | Age: 58
End: 2023-07-18
Payer: COMMERCIAL

## 2023-07-18 VITALS — BODY MASS INDEX: 33.33 KG/M2 | WEIGHT: 225 LBS | TEMPERATURE: 98 F | HEIGHT: 69 IN

## 2023-07-18 DIAGNOSIS — G56.02 LEFT CARPAL TUNNEL SYNDROME: Primary | ICD-10-CM

## 2023-07-18 PROCEDURE — 99203 OFFICE O/P NEW LOW 30 MIN: CPT | Performed by: ORTHOPAEDIC SURGERY

## 2023-07-18 SDOH — HEALTH STABILITY: PHYSICAL HEALTH: ON AVERAGE, HOW MANY DAYS PER WEEK DO YOU ENGAGE IN MODERATE TO STRENUOUS EXERCISE (LIKE A BRISK WALK)?: 5 DAYS

## 2023-07-18 SDOH — HEALTH STABILITY: PHYSICAL HEALTH: ON AVERAGE, HOW MANY MINUTES DO YOU ENGAGE IN EXERCISE AT THIS LEVEL?: 60 MIN

## 2023-07-18 NOTE — PROGRESS NOTES
Chief Complaint   Patient presents with    Hand Pain     Left Hand, x 4-5 months, no known injury, no previous hand surgery. States of numbness and tingling and dropping items. Has tried bracing with minimal relief. German Harrington is a 60y.o. year old  male who presents for evaluation of left hand pain. he reports this started 4-5 months ago. he does not remember a specific injury that started the pain. The injury was repetitive injury. His pain is located mainly int he radial digits. The pain is worse with activity and better with rest.  The patient has tried  wrist splints, NSAIDs, pain medication . The treatment has not been effective. The patient is Right hand dominant. The patients occupation is is a  at [employer].     Past Medical History:   Diagnosis Date    Asthma     Bipolar 1 disorder ([address])     Cardiomyopathy ([address])     COPD (chronic obstructive pulmonary disease) ([address]) 1/18/2021    Depression     Hyperlipemia     Pneumonia due to COVID-19 virus 1/17/2021    TFCC (triangular fibrocartilage complex) tear 12/15/2014     Past Surgical History:   Procedure Laterality Date    ANESTHESIA NERVE BLOCK Left 1/24/2019    LEFT C3,4,5 MEDIAL BRANCH BLOCK performed by Tam Romero MD at [address] [facility] [address]      inguinal and umbical    NERVE BLOCK Left 11/01/2018    medial branch block    NERVE BLOCK Left 01/24/2019    cervical mbb    OTHER SURGICAL HISTORY Right feb 2014    repair tricepts tondon    [place] NJX DX/THER AGT PVRT FACET JT CRV/THRC 1 LEVEL Left 11/1/2018    LEFT C3 C4 C5 MEDIAL BRANCH BLOCK performed by Tam Romero MD at [address] [facility] Left 10/02/2019    Dr Pedro Pablo Roe [facility]        Current Outpatient Medications:     rosuvastatin (CRESTOR) 10 MG tablet, Take 1 tablet by mouth nightly, Disp: 90 tablet, Rfl: 1    QUEtiapine

## 2023-07-20 ENCOUNTER — PREP FOR PROCEDURE (OUTPATIENT)
Dept: ORTHOPEDIC SURGERY | Age: 58
End: 2023-07-20

## 2023-07-20 ENCOUNTER — TELEPHONE (OUTPATIENT)
Dept: ORTHOPEDIC SURGERY | Age: 58
End: 2023-07-20

## 2023-07-20 PROBLEM — G56.02 CARPAL TUNNEL SYNDROME ON LEFT: Status: ACTIVE | Noted: 2023-07-20

## 2023-07-20 NOTE — TELEPHONE ENCOUNTER
Prior Authorization Form:      DEMOGRAPHICS:                     Patient Name:  Mariela Zamora II  Patient :  1965            Insurance:  Payor: Dulce Maria Simons / Plan: Dulce Maria Simons - [place] PPO / Product Type: *No Product type* /   Insurance ID Number:    Payer/Plan Subscr  Sex Relation Sub. Ins. ID Effective Group Num   1.  [facility] -* Callie Galdamez R* 1965 Male Self [id number]                                    [id number]         DIAGNOSIS & PROCEDURE:                       Procedure/Operation: LEFT WRIST CARPAL TUNNEL RELEASE           CPT Code: 81542    Diagnosis:  LEFT WRIST CARPAL TUNNEL    ICD10 Code: G56.02    Location:  [facility]    Surgeon:  HEYDI REYES    SCHEDULING INFORMATION:                          Date:     Time: TO FOLLOW              Anesthesia:  Nitin Block                                                       Status:  Outpatient        Special Comments:  NONE       Electronically signed by Michela Carl ATC on 2023 at 2:54 PM

## 2023-07-31 ENCOUNTER — HOSPITAL ENCOUNTER (OUTPATIENT)
Age: 58
Discharge: HOME | End: 2023-07-31
Payer: COMMERCIAL

## 2023-07-31 LAB
EKG ATRIAL RATE: 53 BPM
EKG P AXIS: 30 DEGREES
EKG P-R INTERVAL: 198 MS
EKG Q-T INTERVAL: 426 MS
EKG QRS DURATION: 100 MS
EKG QTC CALCULATION (BAZETT): 399 MS
EKG R AXIS: 9 DEGREES
EKG T AXIS: 35 DEGREES
EKG VENTRICULAR RATE: 53 BPM

## 2023-07-31 PROCEDURE — 93005 ELECTROCARDIOGRAM TRACING: CPT | Performed by: ANESTHESIOLOGY

## 2023-08-01 NOTE — PROGRESS NOTES
Abnormal pre op EKG faxed to DR Gennaro Allen with request for clearance for upcoming surgery with DR Limon Libby

## 2023-08-03 ENCOUNTER — ANESTHESIA EVENT (OUTPATIENT)
Dept: OPERATING ROOM | Age: 58
End: 2023-08-03
Payer: COMMERCIAL

## 2023-08-03 ENCOUNTER — OFFICE VISIT (OUTPATIENT)
Dept: FAMILY MEDICINE CLINIC | Age: 58
End: 2023-08-03
Payer: COMMERCIAL

## 2023-08-03 VITALS
BODY MASS INDEX: 34.7 KG/M2 | DIASTOLIC BLOOD PRESSURE: 76 MMHG | HEART RATE: 69 BPM | TEMPERATURE: 97.6 F | SYSTOLIC BLOOD PRESSURE: 118 MMHG | OXYGEN SATURATION: 96 % | WEIGHT: 234.3 LBS | HEIGHT: 69 IN

## 2023-08-03 DIAGNOSIS — G56.02 CARPAL TUNNEL SYNDROME ON LEFT: ICD-10-CM

## 2023-08-03 DIAGNOSIS — E78.49 OTHER HYPERLIPIDEMIA: ICD-10-CM

## 2023-08-03 DIAGNOSIS — Z01.818 PRE-OP EXAM: Primary | ICD-10-CM

## 2023-08-03 PROBLEM — J45.41 MODERATE PERSISTENT ASTHMA WITH EXACERBATION: Status: RESOLVED | Noted: 2019-11-18 | Resolved: 2023-08-03

## 2023-08-03 PROBLEM — J44.9 CHRONIC OBSTRUCTIVE PULMONARY DISEASE (HCC): Status: RESOLVED | Noted: 2021-01-18 | Resolved: 2023-08-03

## 2023-08-03 PROCEDURE — 99213 OFFICE O/P EST LOW 20 MIN: CPT | Performed by: INTERNAL MEDICINE

## 2023-08-03 ASSESSMENT — ENCOUNTER SYMPTOMS
SHORTNESS OF BREATH: 0
ABDOMINAL PAIN: 0
BLOOD IN STOOL: 0
SORE THROAT: 0
NAUSEA: 0
EYE DISCHARGE: 0
EYE PAIN: 0
SINUS PAIN: 0

## 2023-08-03 NOTE — PROGRESS NOTES
syndrome on left  3. Other hyperlipidemia  Comments:  Stable  Crestor 10 mg qd          PLAN:     Pt is clinically stable    No cardiac symptoms    No raspatory symptoms    Cardiacwise cleared-  Dr Daphne Kong  Medically at avg risk for CTS surgery    Pt is stable on current medical treatment. Continue current treatment plan    Side effects/Adverse effects/Precautions are reviewed with patient. Low salt, Low Carb diet an low fat diet  Continue medications as advised and take them regularly  Regular exercises as advised    Discussed natural and expected course of this diagnosis and need to alert me if symptoms do not follow expected course, or if any worse. Smoking cessation if applicable, discussed with patient. Patient Instructions   The medication list included in this document is our record of what you are currently taking, including any changes that were made at today's visit. If you find any differences when compared to your medications at home, or have any questions that were not answered at your visit, please contact the office. Return for As Scheduled earlier.

## 2023-08-08 ENCOUNTER — ANESTHESIA (OUTPATIENT)
Dept: OPERATING ROOM | Age: 58
End: 2023-08-08
Payer: COMMERCIAL

## 2023-08-08 ENCOUNTER — HOSPITAL ENCOUNTER (OUTPATIENT)
Age: 58
Setting detail: OUTPATIENT SURGERY
Discharge: HOME | End: 2023-08-08
Attending: ORTHOPAEDIC SURGERY | Admitting: ORTHOPAEDIC SURGERY
Payer: COMMERCIAL

## 2023-08-08 VITALS
HEART RATE: 61 BPM | HEIGHT: 69 IN | WEIGHT: 235.25 LBS | SYSTOLIC BLOOD PRESSURE: 102 MMHG | TEMPERATURE: 97 F | BODY MASS INDEX: 34.84 KG/M2 | OXYGEN SATURATION: 97 % | DIASTOLIC BLOOD PRESSURE: 72 MMHG | RESPIRATION RATE: 16 BRPM

## 2023-08-08 DIAGNOSIS — G56.02 CARPAL TUNNEL SYNDROME ON LEFT: ICD-10-CM

## 2023-08-08 PROCEDURE — 2580000003 HC RX 258: Performed by: ANESTHESIOLOGY

## 2023-08-08 PROCEDURE — 2500000003 HC RX 250 WO HCPCS: Performed by: NURSE ANESTHETIST, CERTIFIED REGISTERED

## 2023-08-08 PROCEDURE — 64721 CARPAL TUNNEL SURGERY: CPT | Performed by: ORTHOPAEDIC SURGERY

## 2023-08-08 PROCEDURE — 6360000002 HC RX W HCPCS: Performed by: NURSE ANESTHETIST, CERTIFIED REGISTERED

## 2023-08-08 PROCEDURE — 3600000002 HC SURGERY LEVEL 2 BASE: Performed by: ORTHOPAEDIC SURGERY

## 2023-08-08 PROCEDURE — 6370000000 HC RX 637 (ALT 250 FOR IP): Performed by: ANESTHESIOLOGY

## 2023-08-08 PROCEDURE — 3700000001 HC ADD 15 MINUTES (ANESTHESIA): Performed by: ORTHOPAEDIC SURGERY

## 2023-08-08 PROCEDURE — 7100000011 HC PHASE II RECOVERY - ADDTL 15 MIN: Performed by: ORTHOPAEDIC SURGERY

## 2023-08-08 PROCEDURE — 2500000003 HC RX 250 WO HCPCS: Performed by: ANESTHESIOLOGY

## 2023-08-08 PROCEDURE — 2580000003 HC RX 258: Performed by: ORTHOPAEDIC SURGERY

## 2023-08-08 PROCEDURE — 3700000000 HC ANESTHESIA ATTENDED CARE: Performed by: ORTHOPAEDIC SURGERY

## 2023-08-08 PROCEDURE — 2709999900 HC NON-CHARGEABLE SUPPLY: Performed by: ORTHOPAEDIC SURGERY

## 2023-08-08 PROCEDURE — 6360000002 HC RX W HCPCS: Performed by: ORTHOPAEDIC SURGERY

## 2023-08-08 PROCEDURE — 7100000010 HC PHASE II RECOVERY - FIRST 15 MIN: Performed by: ORTHOPAEDIC SURGERY

## 2023-08-08 PROCEDURE — 3600000012 HC SURGERY LEVEL 2 ADDTL 15MIN: Performed by: ORTHOPAEDIC SURGERY

## 2023-08-08 RX ORDER — FENTANYL CITRATE 50 UG/ML
INJECTION, SOLUTION INTRAMUSCULAR; INTRAVENOUS PRN
Status: DISCONTINUED | OUTPATIENT
Start: 2023-08-08 | End: 2023-08-08 | Stop reason: SDUPTHER

## 2023-08-08 RX ORDER — SODIUM CHLORIDE, SODIUM LACTATE, POTASSIUM CHLORIDE, CALCIUM CHLORIDE 600; 310; 30; 20 MG/100ML; MG/100ML; MG/100ML; MG/100ML
INJECTION, SOLUTION INTRAVENOUS CONTINUOUS
Status: DISCONTINUED | OUTPATIENT
Start: 2023-08-08 | End: 2023-08-08 | Stop reason: HOSPADM

## 2023-08-08 RX ORDER — DROPERIDOL 2.5 MG/ML
0.62 INJECTION, SOLUTION INTRAMUSCULAR; INTRAVENOUS
Status: DISCONTINUED | OUTPATIENT
Start: 2023-08-08 | End: 2023-08-08 | Stop reason: HOSPADM

## 2023-08-08 RX ORDER — MEPERIDINE HYDROCHLORIDE 25 MG/ML
12.5 INJECTION INTRAMUSCULAR; INTRAVENOUS; SUBCUTANEOUS ONCE
Status: DISCONTINUED | OUTPATIENT
Start: 2023-08-08 | End: 2023-08-08 | Stop reason: HOSPADM

## 2023-08-08 RX ORDER — PROPOFOL 10 MG/ML
INJECTION, EMULSION INTRAVENOUS CONTINUOUS PRN
Status: DISCONTINUED | OUTPATIENT
Start: 2023-08-08 | End: 2023-08-08 | Stop reason: SDUPTHER

## 2023-08-08 RX ORDER — DIPHENHYDRAMINE HYDROCHLORIDE 50 MG/ML
12.5 INJECTION INTRAMUSCULAR; INTRAVENOUS
Status: DISCONTINUED | OUTPATIENT
Start: 2023-08-08 | End: 2023-08-08 | Stop reason: HOSPADM

## 2023-08-08 RX ORDER — LIDOCAINE HYDROCHLORIDE 20 MG/ML
INJECTION, SOLUTION INFILTRATION; PERINEURAL PRN
Status: DISCONTINUED | OUTPATIENT
Start: 2023-08-08 | End: 2023-08-08 | Stop reason: SDUPTHER

## 2023-08-08 RX ORDER — HYDROCODONE BITARTRATE AND ACETAMINOPHEN 5; 325 MG/1; MG/1
1 TABLET ORAL EVERY 6 HOURS PRN
Status: DISCONTINUED | OUTPATIENT
Start: 2023-08-08 | End: 2023-08-08 | Stop reason: HOSPADM

## 2023-08-08 RX ORDER — LIDOCAINE HYDROCHLORIDE 5 MG/ML
INJECTION, SOLUTION INFILTRATION; INTRAVENOUS
Status: COMPLETED | OUTPATIENT
Start: 2023-08-08 | End: 2023-08-08

## 2023-08-08 RX ORDER — MORPHINE SULFATE 2 MG/ML
1 INJECTION, SOLUTION INTRAMUSCULAR; INTRAVENOUS EVERY 5 MIN PRN
Status: DISCONTINUED | OUTPATIENT
Start: 2023-08-08 | End: 2023-08-08 | Stop reason: HOSPADM

## 2023-08-08 RX ORDER — SODIUM CHLORIDE 9 MG/ML
INJECTION, SOLUTION INTRAVENOUS PRN
Status: DISCONTINUED | OUTPATIENT
Start: 2023-08-08 | End: 2023-08-08 | Stop reason: HOSPADM

## 2023-08-08 RX ORDER — HYDROCODONE BITARTRATE AND ACETAMINOPHEN 5; 325 MG/1; MG/1
1 TABLET ORAL EVERY 6 HOURS PRN
Qty: 12 TABLET | Refills: 0 | Status: SHIPPED | OUTPATIENT
Start: 2023-08-08 | End: 2023-08-11

## 2023-08-08 RX ORDER — HYDROMORPHONE HYDROCHLORIDE 1 MG/ML
0.5 INJECTION, SOLUTION INTRAMUSCULAR; INTRAVENOUS; SUBCUTANEOUS EVERY 4 HOURS PRN
Status: DISCONTINUED | OUTPATIENT
Start: 2023-08-08 | End: 2023-08-08 | Stop reason: HOSPADM

## 2023-08-08 RX ORDER — SODIUM CHLORIDE 0.9 % (FLUSH) 0.9 %
5-40 SYRINGE (ML) INJECTION PRN
Status: DISCONTINUED | OUTPATIENT
Start: 2023-08-08 | End: 2023-08-08 | Stop reason: HOSPADM

## 2023-08-08 RX ORDER — SODIUM CHLORIDE 0.9 % (FLUSH) 0.9 %
5-40 SYRINGE (ML) INJECTION EVERY 12 HOURS SCHEDULED
Status: DISCONTINUED | OUTPATIENT
Start: 2023-08-08 | End: 2023-08-08 | Stop reason: HOSPADM

## 2023-08-08 RX ORDER — FENTANYL CITRATE 0.05 MG/ML
50 INJECTION, SOLUTION INTRAMUSCULAR; INTRAVENOUS EVERY 5 MIN PRN
Status: DISCONTINUED | OUTPATIENT
Start: 2023-08-08 | End: 2023-08-08 | Stop reason: HOSPADM

## 2023-08-08 RX ORDER — MIDAZOLAM HYDROCHLORIDE 1 MG/ML
INJECTION INTRAMUSCULAR; INTRAVENOUS PRN
Status: DISCONTINUED | OUTPATIENT
Start: 2023-08-08 | End: 2023-08-08 | Stop reason: SDUPTHER

## 2023-08-08 RX ADMIN — HYDROCODONE BITARTRATE AND ACETAMINOPHEN 1 TABLET: 5; 325 TABLET ORAL at 07:45

## 2023-08-08 RX ADMIN — PROPOFOL 75 MCG/KG/MIN: 10 INJECTION, EMULSION INTRAVENOUS at 07:07

## 2023-08-08 RX ADMIN — LIDOCAINE HYDROCHLORIDE 50 MG: 20 INJECTION, SOLUTION INFILTRATION; PERINEURAL at 07:07

## 2023-08-08 RX ADMIN — FENTANYL CITRATE 50 MCG: 50 INJECTION INTRAMUSCULAR; INTRAVENOUS at 07:07

## 2023-08-08 RX ADMIN — CEFAZOLIN 2000 MG: 2 INJECTION, POWDER, FOR SOLUTION INTRAMUSCULAR; INTRAVENOUS at 07:05

## 2023-08-08 RX ADMIN — LIDOCAINE HYDROCHLORIDE 50 ML: 5 INJECTION, SOLUTION INFILTRATION at 07:03

## 2023-08-08 RX ADMIN — FENTANYL CITRATE 50 MCG: 50 INJECTION INTRAMUSCULAR; INTRAVENOUS at 07:03

## 2023-08-08 RX ADMIN — MIDAZOLAM 2 MG: 1 INJECTION INTRAMUSCULAR; INTRAVENOUS at 07:02

## 2023-08-08 RX ADMIN — SODIUM CHLORIDE, POTASSIUM CHLORIDE, SODIUM LACTATE AND CALCIUM CHLORIDE: 600; 310; 30; 20 INJECTION, SOLUTION INTRAVENOUS at 06:29

## 2023-08-08 ASSESSMENT — PAIN SCALES - GENERAL
PAINLEVEL_OUTOF10: 6
PAINLEVEL_OUTOF10: 0
PAINLEVEL_OUTOF10: 6

## 2023-08-08 ASSESSMENT — LIFESTYLE VARIABLES: SMOKING_STATUS: 0

## 2023-08-08 ASSESSMENT — PAIN DESCRIPTION - LOCATION
LOCATION: WRIST
LOCATION: WRIST

## 2023-08-08 ASSESSMENT — PAIN DESCRIPTION - PAIN TYPE
TYPE: SURGICAL PAIN
TYPE: SURGICAL PAIN

## 2023-08-08 ASSESSMENT — PAIN DESCRIPTION - ORIENTATION
ORIENTATION: LEFT
ORIENTATION: LEFT

## 2023-08-08 ASSESSMENT — PAIN - FUNCTIONAL ASSESSMENT: PAIN_FUNCTIONAL_ASSESSMENT: 0-10

## 2023-08-08 ASSESSMENT — PAIN DESCRIPTION - DESCRIPTORS
DESCRIPTORS: ACHING
DESCRIPTORS: ACHING

## 2023-08-08 NOTE — ANESTHESIA PROCEDURE NOTES
Peripheral Block    Patient location during procedure: OR  Reason for block: primary anesthetic  Start time: 8/8/2023 7:03 AM  End time: 8/8/2023 7:08 AM  Staffing  Performed: anesthesiologist and resident/CRNA   Anesthesiologist: Inna Rubio MD  Resident/CRNA: MELANIE Del Valle - CRNA  Preanesthetic Checklist  Completed: patient identified, IV checked, site marked, risks and benefits discussed, surgical/procedural consents, equipment checked, pre-op evaluation, timeout performed, anesthesia consent given, oxygen available, monitors applied/VS acknowledged, fire risk safety assessment completed and verbalized and blood product R/B/A discussed and consented  Peripheral Block   Patient position: supine  Prep: alcohol swabs  Provider prep: mask  Patient monitoring: cardiac monitor, continuous pulse ox, continuous capnometry, frequent blood pressure checks, IV access, oxygen and responsive to questions  Block type: Nitin block  Laterality: left  Injection technique: single-shot  Guidance: other      Medications Administered  lidocaine injection 0.5% - IntraVENous, Hand Left   50 mL - 8/8/2023 7:03:00 AM

## 2023-08-08 NOTE — ANESTHESIA POSTPROCEDURE EVALUATION
Department of Anesthesiology  Postprocedure Note    Patient: Monica Lange  MRN: [id number]  YOB: 1965  Date of evaluation: 8/8/2023      Procedure Summary     Date: 08/08/23 Room / Location: [address] [place] [address] 01 / [address]    Anesthesia Start: 0700 Anesthesia Stop: 7264    Procedure: LEFT WRIST CARPAL TUNNEL RELEASE-8/8/23 (Left) Diagnosis:       Carpal tunnel syndrome on left      (Carpal tunnel syndrome on left [G56.02])    Surgeons: Danna Starkey DO Responsible Provider: Tika Cheney MD    Anesthesia Type: MAC ASA Status: 3          Anesthesia Type: MAC    Kyle Phase I: Kyle Score: 10    Kyle Phase II: Kyle Score: 9      Anesthesia Post Evaluation    Patient location during evaluation: PACU  Patient participation: complete - patient participated  Level of consciousness: awake and alert  Airway patency: patent  Nausea & Vomiting: no nausea and no vomiting  Complications: no  Cardiovascular status: hemodynamically stable  Respiratory status: room air and spontaneous ventilation  Pain management: satisfactory to patient

## 2023-08-08 NOTE — H&P
positive, Tinnells sign negative, Median nerve compression test positive ,  Finklesteins negative, CMC Grind test negative, Piano Key Test negative. Xrays: not performed today. Radiographic findings reviewed with patient     Impression:        Encounter Diagnosis   Name Primary? Left carpal tunnel syndrome Yes         Plan: Natural history and expected course discussed. Questions answered. Educational materials distributed. Rest, ice, compression, and elevation (RICE) therapy. I had a lengthy discussion with the patient regarding their diagnosis. I explained treatment options including surgical vs non surgical treatment. I reviewed in detail the risks and benefits and outlined the procedure in detail with expected outcomes and possible complications. I also discussed non surgical treatment such as injections (CSI), physical therapy, topical creams and NSAID's. They have elected for surgical management at this time. We will perform a Left carpal tunnel release 8/8/23. The risks and benefits were reviewed with the patient such as:  DVT, infection,  injuries to blood vessels and nerves, non relief of symptoms, continued pain, worsening of symptoms.

## 2023-08-08 NOTE — OP NOTE
Operative Note      Patient: Raf Gaines II  YOB: 1965  MRN: [id number]    Date of Procedure: 8/8/2023    Pre-Op Diagnosis Codes:     * Carpal tunnel syndrome on left [G56.02]    Post-Op Diagnosis: Same       Procedure(s):  LEFT WRIST CARPAL TUNNEL RELEASE-8/8/23    Surgeon(s): Dahlia Mart DO    Assistant:   * No surgical staff found *    Anesthesia: Crozier Block    Estimated Blood Loss (mL): less than 638     Complications: None    Specimens:   * No specimens in log *    Implants:  * No implants in log *      Drains: * No LDAs found *    Findings: as above        Detailed Description of Procedure:   below    SURGEON: JUAN C Díaz   ASSISTANT: as above  PREOPERATIVE DIAGNOSIS: Left wrist carpal tunnel syndrome. POSTOPERATIVE DIAGNOSIS: Left wrist carpal tunnel syndrome. PROCEDURE: Release transverse carpal ligament, Left wrist.   ANESTHESIA: bb  ESTIMATED BLOOD LOSS: mild in degree  COMPLICATIONS: None. Brief Hospital Course: The  patients well  known to Vilma Freed DO's practice with persistent complaints of left wrist/hand pain and numbness. Wrsit and hand pain has failed to be relieved by non-operative conservative measures, and has began affecting daily activities of living. After examination of the patient, review of the EMG, radiologic studies, and appropriate pre-operative risk assessment, Vilma Freed DO recommended left carpal tunnel release,  which the patient was agreeable towards. OPERATIVE PROCEDURE: The patient was brought to the operating suite and was   given anesthesia. The left arm was identified with a   preoperative time-out, the arm was prepped and draped in sterile fashion, I  outlined incision along the volar side of the wrist just ulnar to the thenar   wrist crease. I made an approximately 2 to 4-cm incision over this area through the skin   and subcutaneous tissue. I dissected that down to the level of the palmar   fascia.  It was identified

## 2023-08-22 ENCOUNTER — OFFICE VISIT (OUTPATIENT)
Dept: ORTHOPEDIC SURGERY | Age: 58
End: 2023-08-22

## 2023-08-22 VITALS — RESPIRATION RATE: 16 BRPM | BODY MASS INDEX: 33.62 KG/M2 | HEIGHT: 69 IN | OXYGEN SATURATION: 97 % | WEIGHT: 227 LBS

## 2023-08-22 DIAGNOSIS — G56.02 LEFT CARPAL TUNNEL SYNDROME: Primary | ICD-10-CM

## 2023-08-22 PROCEDURE — 99024 POSTOP FOLLOW-UP VISIT: CPT | Performed by: ORTHOPAEDIC SURGERY

## 2023-10-12 ENCOUNTER — OFFICE VISIT (OUTPATIENT)
Dept: ORTHOPEDIC SURGERY | Age: 58
End: 2023-10-12

## 2023-10-12 VITALS — TEMPERATURE: 98 F | HEIGHT: 69 IN | BODY MASS INDEX: 33.62 KG/M2 | WEIGHT: 227 LBS

## 2023-10-12 DIAGNOSIS — G56.02 LEFT CARPAL TUNNEL SYNDROME: Primary | ICD-10-CM

## 2023-10-12 PROCEDURE — 99024 POSTOP FOLLOW-UP VISIT: CPT | Performed by: ORTHOPAEDIC SURGERY

## 2023-10-12 NOTE — PROGRESS NOTES
Keke Dale is here for followup after left carpal tunnel surgery. The patient is not having any pain. . The patient notes improvement in the following symptoms:strength, numbness, pain, sensation. He is doing well. The patient denies none. Post op problems reported: none. He stated he has some soreness over the incision and it feels a little stiff. Objective:         General :    alert, appears stated age, and cooperative   Sutures:   Sutures out. Incision:  healing well, no significant drainage, no dehiscence, no significant erythema   Tenderness:  none   Flexion ROM:  full range of motion   Extension ROM:  full range of motion   Effusion:  no         Assessment:        Status post left carpal tunnel surgery. Doing well postoperatively. Plan:   Continue to massage over the incision to help break up scar tissue. HEP  Follow up: prn.

## 2024-01-07 DIAGNOSIS — E78.49 OTHER HYPERLIPIDEMIA: ICD-10-CM

## 2024-01-08 RX ORDER — ROSUVASTATIN CALCIUM 10 MG/1
10 TABLET, COATED ORAL NIGHTLY
Qty: 90 TABLET | Refills: 1 | OUTPATIENT
Start: 2024-01-08 | End: 2024-04-07

## 2024-03-18 ASSESSMENT — PATIENT HEALTH QUESTIONNAIRE - PHQ9
6. FEELING BAD ABOUT YOURSELF - OR THAT YOU ARE A FAILURE OR HAVE LET YOURSELF OR YOUR FAMILY DOWN: NOT AT ALL
4. FEELING TIRED OR HAVING LITTLE ENERGY: NOT AT ALL
SUM OF ALL RESPONSES TO PHQ QUESTIONS 1-9: 0
10. IF YOU CHECKED OFF ANY PROBLEMS, HOW DIFFICULT HAVE THESE PROBLEMS MADE IT FOR YOU TO DO YOUR WORK, TAKE CARE OF THINGS AT HOME, OR GET ALONG WITH OTHER PEOPLE: NOT DIFFICULT AT ALL
4. FEELING TIRED OR HAVING LITTLE ENERGY: NOT AT ALL
8. MOVING OR SPEAKING SO SLOWLY THAT OTHER PEOPLE COULD HAVE NOTICED. OR THE OPPOSITE - BEING SO FIDGETY OR RESTLESS THAT YOU HAVE BEEN MOVING AROUND A LOT MORE THAN USUAL: NOT AT ALL
9. THOUGHTS THAT YOU WOULD BE BETTER OFF DEAD, OR OF HURTING YOURSELF: NOT AT ALL
7. TROUBLE CONCENTRATING ON THINGS, SUCH AS READING THE NEWSPAPER OR WATCHING TELEVISION: NOT AT ALL
SUM OF ALL RESPONSES TO PHQ QUESTIONS 1-9: 0
SUM OF ALL RESPONSES TO PHQ9 QUESTIONS 1 & 2: 0
5. POOR APPETITE OR OVEREATING: NOT AT ALL
6. FEELING BAD ABOUT YOURSELF - OR THAT YOU ARE A FAILURE OR HAVE LET YOURSELF OR YOUR FAMILY DOWN: NOT AT ALL
7. TROUBLE CONCENTRATING ON THINGS, SUCH AS READING THE NEWSPAPER OR WATCHING TELEVISION: NOT AT ALL
5. POOR APPETITE OR OVEREATING: NOT AT ALL
1. LITTLE INTEREST OR PLEASURE IN DOING THINGS: NOT AT ALL
SUM OF ALL RESPONSES TO PHQ QUESTIONS 1-9: 0
SUM OF ALL RESPONSES TO PHQ QUESTIONS 1-9: 0
10. IF YOU CHECKED OFF ANY PROBLEMS, HOW DIFFICULT HAVE THESE PROBLEMS MADE IT FOR YOU TO DO YOUR WORK, TAKE CARE OF THINGS AT HOME, OR GET ALONG WITH OTHER PEOPLE: NOT DIFFICULT AT ALL
9. THOUGHTS THAT YOU WOULD BE BETTER OFF DEAD, OR OF HURTING YOURSELF: NOT AT ALL
3. TROUBLE FALLING OR STAYING ASLEEP: NOT AT ALL
3. TROUBLE FALLING OR STAYING ASLEEP: NOT AT ALL
SUM OF ALL RESPONSES TO PHQ QUESTIONS 1-9: 0
2. FEELING DOWN, DEPRESSED OR HOPELESS: NOT AT ALL
8. MOVING OR SPEAKING SO SLOWLY THAT OTHER PEOPLE COULD HAVE NOTICED. OR THE OPPOSITE, BEING SO FIGETY OR RESTLESS THAT YOU HAVE BEEN MOVING AROUND A LOT MORE THAN USUAL: NOT AT ALL
1. LITTLE INTEREST OR PLEASURE IN DOING THINGS: NOT AT ALL
2. FEELING DOWN, DEPRESSED OR HOPELESS: NOT AT ALL

## 2024-03-20 ENCOUNTER — OFFICE VISIT (OUTPATIENT)
Dept: FAMILY MEDICINE CLINIC | Age: 59
End: 2024-03-20
Payer: COMMERCIAL

## 2024-03-20 VITALS
WEIGHT: 245.8 LBS | OXYGEN SATURATION: 98 % | HEIGHT: 69 IN | BODY MASS INDEX: 36.4 KG/M2 | SYSTOLIC BLOOD PRESSURE: 132 MMHG | HEART RATE: 70 BPM | TEMPERATURE: 98.6 F | DIASTOLIC BLOOD PRESSURE: 76 MMHG

## 2024-03-20 DIAGNOSIS — F31.81 BIPOLAR 2 DISORDER (HCC): ICD-10-CM

## 2024-03-20 DIAGNOSIS — E78.49 OTHER HYPERLIPIDEMIA: Primary | ICD-10-CM

## 2024-03-20 DIAGNOSIS — K21.9 GASTROESOPHAGEAL REFLUX DISEASE, UNSPECIFIED WHETHER ESOPHAGITIS PRESENT: ICD-10-CM

## 2024-03-20 DIAGNOSIS — Z12.5 SCREENING PSA (PROSTATE SPECIFIC ANTIGEN): ICD-10-CM

## 2024-03-20 PROCEDURE — 99214 OFFICE O/P EST MOD 30 MIN: CPT | Performed by: INTERNAL MEDICINE

## 2024-03-20 ASSESSMENT — ENCOUNTER SYMPTOMS
NAUSEA: 0
SHORTNESS OF BREATH: 0
BLOOD IN STOOL: 0
SINUS PAIN: 0
SORE THROAT: 0
ABDOMINAL PAIN: 0
EYE PAIN: 0

## 2024-03-20 NOTE — PROGRESS NOTES
Lipid, Fasting; Future  -     TSH; Future  -     Urinalysis with Microscopic; Future  2. Gastroesophageal reflux disease, unspecified whether esophagitis present  -     CBC with Auto Differential; Future  3. Bipolar 2 disorder (HCC)  Comments:  Follwoing with Psych Nurse Practionaer  4. Screening PSA (prostate specific antigen)  -     PSA Screening; Future         PLAN:       Advise to have ( Fasting) lab test prior to next visit.     Pt is stable on current medical treatment.   Continue current treatment plan    Side effects/Adverse effects/Precautions are reviewed with patient.     Low salt, Low Carb diet an low fat diet  Continue medications as advised and take them regularly  Regular exercises as advised    Discussed natural and expected course of this diagnosis and need to alert me if symptoms do not follow expected course, or if any worse.    Smoking cessation if applicable, discussed with patient.       There are no Patient Instructions on file for this visit.    Return in about 10 weeks (around 5/29/2024).

## 2024-03-25 DIAGNOSIS — E78.49 OTHER HYPERLIPIDEMIA: ICD-10-CM

## 2024-03-25 RX ORDER — ROSUVASTATIN CALCIUM 10 MG/1
10 TABLET, COATED ORAL NIGHTLY
Qty: 90 TABLET | Refills: 0 | Status: SHIPPED | OUTPATIENT
Start: 2024-03-25 | End: 2024-06-23

## 2024-04-24 DIAGNOSIS — M79.671 RIGHT FOOT PAIN: Primary | ICD-10-CM

## 2024-04-25 ENCOUNTER — OFFICE VISIT (OUTPATIENT)
Dept: PODIATRY | Age: 59
End: 2024-04-25
Payer: COMMERCIAL

## 2024-04-25 VITALS — BODY MASS INDEX: 36.29 KG/M2 | HEIGHT: 69 IN | WEIGHT: 245 LBS

## 2024-04-25 DIAGNOSIS — M25.571 SINUS TARSI SYNDROME OF RIGHT FOOT: ICD-10-CM

## 2024-04-25 DIAGNOSIS — M79.671 PAIN OF RIGHT FOOT: ICD-10-CM

## 2024-04-25 DIAGNOSIS — Q66.6 PES VALGUS: Primary | ICD-10-CM

## 2024-04-25 DIAGNOSIS — R26.2 DIFFICULTY WALKING: ICD-10-CM

## 2024-04-25 PROCEDURE — 99213 OFFICE O/P EST LOW 20 MIN: CPT | Performed by: PODIATRIST

## 2024-04-26 DIAGNOSIS — M25.571 SINUS TARSI SYNDROME OF RIGHT FOOT: ICD-10-CM

## 2024-04-26 DIAGNOSIS — Q66.6 PES VALGUS: Primary | ICD-10-CM

## 2024-04-26 DIAGNOSIS — M79.671 PAIN OF RIGHT FOOT: ICD-10-CM

## 2024-04-26 RX ORDER — CELECOXIB 200 MG/1
200 CAPSULE ORAL DAILY
Qty: 30 CAPSULE | Refills: 0 | Status: SHIPPED | OUTPATIENT
Start: 2024-04-26 | End: 2024-05-26

## 2024-06-09 SDOH — ECONOMIC STABILITY: INCOME INSECURITY: HOW HARD IS IT FOR YOU TO PAY FOR THE VERY BASICS LIKE FOOD, HOUSING, MEDICAL CARE, AND HEATING?: NOT HARD AT ALL

## 2024-06-09 SDOH — ECONOMIC STABILITY: FOOD INSECURITY: WITHIN THE PAST 12 MONTHS, THE FOOD YOU BOUGHT JUST DIDN'T LAST AND YOU DIDN'T HAVE MONEY TO GET MORE.: NEVER TRUE

## 2024-06-09 SDOH — ECONOMIC STABILITY: FOOD INSECURITY: WITHIN THE PAST 12 MONTHS, YOU WORRIED THAT YOUR FOOD WOULD RUN OUT BEFORE YOU GOT MONEY TO BUY MORE.: NEVER TRUE

## 2024-06-11 ENCOUNTER — HOSPITAL ENCOUNTER (OUTPATIENT)
Age: 59
Discharge: HOME | End: 2024-06-11
Payer: COMMERCIAL

## 2024-06-11 DIAGNOSIS — Z12.5 SCREENING PSA (PROSTATE SPECIFIC ANTIGEN): ICD-10-CM

## 2024-06-11 DIAGNOSIS — E78.49 OTHER HYPERLIPIDEMIA: ICD-10-CM

## 2024-06-11 DIAGNOSIS — K21.9 GASTROESOPHAGEAL REFLUX DISEASE, UNSPECIFIED WHETHER ESOPHAGITIS PRESENT: ICD-10-CM

## 2024-06-11 LAB
ALBUMIN SERPL-MCNC: 4.6 G/DL (ref 3.5–5.2)
ALP SERPL-CCNC: 77 U/L (ref 40–129)
ALT SERPL-CCNC: 32 U/L (ref 0–40)
ANION GAP SERPL CALCULATED.3IONS-SCNC: 11 MMOL/L (ref 7–16)
AST SERPL-CCNC: 30 U/L (ref 0–39)
BACTERIA URNS QL MICRO: ABNORMAL
BASOPHILS # BLD: 0.05 K/UL (ref 0–0.2)
BASOPHILS NFR BLD: 1 % (ref 0–2)
BILIRUB SERPL-MCNC: 0.5 MG/DL (ref 0–1.2)
BILIRUB UR QL STRIP: NEGATIVE
BUN SERPL-MCNC: 13 MG/DL (ref 6–23)
CALCIUM SERPL-MCNC: 9.2 MG/DL (ref 8.6–10.2)
CHLORIDE SERPL-SCNC: 103 MMOL/L (ref 98–107)
CHOLEST SERPL-MCNC: 212 MG/DL
CLARITY UR: ABNORMAL
CO2 SERPL-SCNC: 25 MMOL/L (ref 22–29)
COLOR UR: YELLOW
CREAT SERPL-MCNC: 0.9 MG/DL (ref 0.7–1.2)
EOSINOPHIL # BLD: 0.22 K/UL (ref 0.05–0.5)
EOSINOPHILS RELATIVE PERCENT: 4 % (ref 0–6)
ERYTHROCYTE [DISTWIDTH] IN BLOOD BY AUTOMATED COUNT: 12.9 % (ref 11.5–15)
GFR, ESTIMATED: >90 ML/MIN/1.73M2
GLUCOSE P FAST SERPL-MCNC: 80 MG/DL (ref 74–99)
GLUCOSE UR STRIP-MCNC: NEGATIVE MG/DL
HCT VFR BLD AUTO: 44.5 % (ref 37–54)
HDLC SERPL-MCNC: 59 MG/DL
HGB BLD-MCNC: 15.5 G/DL (ref 12.5–16.5)
HGB UR QL STRIP.AUTO: ABNORMAL
IMM GRANULOCYTES # BLD AUTO: <0.03 K/UL (ref 0–0.58)
IMM GRANULOCYTES NFR BLD: 0 % (ref 0–5)
KETONES UR STRIP-MCNC: ABNORMAL MG/DL
LDLC SERPL CALC-MCNC: 128 MG/DL
LEUKOCYTE ESTERASE UR QL STRIP: NEGATIVE
LYMPHOCYTES NFR BLD: 1.87 K/UL (ref 1.5–4)
LYMPHOCYTES RELATIVE PERCENT: 34 % (ref 20–42)
MCH RBC QN AUTO: 30.4 PG (ref 26–35)
MCHC RBC AUTO-ENTMCNC: 34.8 G/DL (ref 32–34.5)
MCV RBC AUTO: 87.3 FL (ref 80–99.9)
MONOCYTES NFR BLD: 0.57 K/UL (ref 0.1–0.95)
MONOCYTES NFR BLD: 11 % (ref 2–12)
NEUTROPHILS NFR BLD: 50 % (ref 43–80)
NEUTS SEG NFR BLD: 2.73 K/UL (ref 1.8–7.3)
NITRITE UR QL STRIP: NEGATIVE
PH UR STRIP: 6 [PH] (ref 5–9)
PLATELET # BLD AUTO: 219 K/UL (ref 130–450)
PMV BLD AUTO: 10.4 FL (ref 7–12)
POTASSIUM SERPL-SCNC: 4.5 MMOL/L (ref 3.5–5)
PROT SERPL-MCNC: 7.2 G/DL (ref 6.4–8.3)
PROT UR STRIP-MCNC: NEGATIVE MG/DL
RBC # BLD AUTO: 5.1 M/UL (ref 3.8–5.8)
RBC #/AREA URNS HPF: ABNORMAL /HPF
SODIUM SERPL-SCNC: 139 MMOL/L (ref 132–146)
SP GR UR STRIP: 1.02 (ref 1–1.03)
TRIGL SERPL-MCNC: 126 MG/DL
TSH SERPL DL<=0.05 MIU/L-ACNC: 1.65 UIU/ML (ref 0.27–4.2)
UROBILINOGEN UR STRIP-ACNC: 0.2 EU/DL (ref 0–1)
VLDLC SERPL CALC-MCNC: 25 MG/DL
WBC #/AREA URNS HPF: ABNORMAL /HPF
WBC OTHER # BLD: 5.5 K/UL (ref 4.5–11.5)

## 2024-06-11 PROCEDURE — 80061 LIPID PANEL: CPT

## 2024-06-11 PROCEDURE — 36415 COLL VENOUS BLD VENIPUNCTURE: CPT

## 2024-06-11 PROCEDURE — 80164 ASSAY DIPROPYLACETIC ACD TOT: CPT

## 2024-06-11 PROCEDURE — 80053 COMPREHEN METABOLIC PANEL: CPT

## 2024-06-11 PROCEDURE — 84443 ASSAY THYROID STIM HORMONE: CPT

## 2024-06-11 PROCEDURE — 85025 COMPLETE CBC W/AUTO DIFF WBC: CPT

## 2024-06-11 PROCEDURE — 81001 URINALYSIS AUTO W/SCOPE: CPT

## 2024-06-11 PROCEDURE — 80165 DIPROPYLACETIC ACID FREE: CPT

## 2024-06-11 PROCEDURE — G0103 PSA SCREENING: HCPCS

## 2024-06-12 ENCOUNTER — OFFICE VISIT (OUTPATIENT)
Dept: FAMILY MEDICINE CLINIC | Age: 59
End: 2024-06-12
Payer: COMMERCIAL

## 2024-06-12 VITALS
OXYGEN SATURATION: 96 % | DIASTOLIC BLOOD PRESSURE: 80 MMHG | WEIGHT: 248.8 LBS | BODY MASS INDEX: 36.85 KG/M2 | TEMPERATURE: 97.8 F | SYSTOLIC BLOOD PRESSURE: 124 MMHG | HEART RATE: 77 BPM | HEIGHT: 69 IN

## 2024-06-12 DIAGNOSIS — E78.49 OTHER HYPERLIPIDEMIA: Primary | ICD-10-CM

## 2024-06-12 DIAGNOSIS — K21.9 GASTROESOPHAGEAL REFLUX DISEASE, UNSPECIFIED WHETHER ESOPHAGITIS PRESENT: ICD-10-CM

## 2024-06-12 DIAGNOSIS — F31.81 BIPOLAR 2 DISORDER (HCC): ICD-10-CM

## 2024-06-12 PROBLEM — R60.0 LOCALIZED EDEMA: Status: RESOLVED | Noted: 2021-05-26 | Resolved: 2024-06-12

## 2024-06-12 PROBLEM — R26.2 DIFFICULTY WALKING: Status: RESOLVED | Noted: 2021-05-26 | Resolved: 2024-06-12

## 2024-06-12 LAB — PSA SERPL-MCNC: 0.51 NG/ML (ref 0–4)

## 2024-06-12 PROCEDURE — 99214 OFFICE O/P EST MOD 30 MIN: CPT | Performed by: INTERNAL MEDICINE

## 2024-06-12 RX ORDER — ROSUVASTATIN CALCIUM 20 MG/1
20 TABLET, COATED ORAL NIGHTLY
Qty: 90 TABLET | Refills: 0 | Status: SHIPPED | OUTPATIENT
Start: 2024-06-12 | End: 2024-09-10

## 2024-06-12 ASSESSMENT — ENCOUNTER SYMPTOMS
EYE PAIN: 0
EYE DISCHARGE: 0
SINUS PAIN: 0
NAUSEA: 0
ABDOMINAL PAIN: 0
SORE THROAT: 0
BLOOD IN STOOL: 0
SHORTNESS OF BREATH: 0

## 2024-06-12 NOTE — PROGRESS NOTES
Chief Complaint   Patient presents with    Hyperlipidemia     Pt here for follow up on hyperlipidemia, pt reports feeling great      Discuss Labs     Completed on 06/11/2024     Medication Refill       HPI:  Patient is here for follow-up     Feeling well    No complaints      Allergy and Medications are reviewed and updated.  Past Medical History, Surgical History, and Family History has been reviewed and updated.    Review of Systems:  Review of Systems   Constitutional:  Negative for chills and fever.   HENT:  Negative for congestion, sinus pain and sore throat.    Eyes:  Negative for pain and discharge.   Respiratory:  Negative for shortness of breath (No new SOb).    Cardiovascular:  Negative for chest pain.   Gastrointestinal:  Negative for abdominal pain, blood in stool and nausea.   Genitourinary:  Negative for flank pain and frequency.   Musculoskeletal:  Negative for neck pain.   Hematological:  Does not bruise/bleed easily.   Psychiatric/Behavioral:  Negative for suicidal ideas.          Vitals:    06/12/24 1534   BP: 124/80   Position: Sitting   Pulse: 77   Temp: 97.8 °F (36.6 °C)   TempSrc: Temporal   SpO2: 96%   Weight: 112.9 kg (248 lb 12.8 oz)   Height: 1.753 m (5' 9\")       Physical Exam  Vitals reviewed.   Constitutional:       Appearance: He is well-developed.   HENT:      Head: Normocephalic and atraumatic.   Eyes:      Conjunctiva/sclera: Conjunctivae normal.      Pupils: Pupils are equal, round, and reactive to light.   Neck:      Vascular: No JVD.   Cardiovascular:      Rate and Rhythm: Normal rate and regular rhythm.   Pulmonary:      Effort: Pulmonary effort is normal.      Breath sounds: Normal breath sounds.   Abdominal:      General: Bowel sounds are normal.      Palpations: Abdomen is soft.   Musculoskeletal:         General: Normal range of motion.   Skin:     General: Skin is warm and dry.   Neurological:      Mental Status: He is alert and oriented to person, place, and time.

## 2024-06-15 LAB
VALPROIC ACID % FREE: ABNORMAL % (ref 5–18)
VALPROIC ACID TOTAL: 11 UG/ML (ref 50–125)
VALPROIC ACID, FREE: <7 UG/ML (ref 7–23)

## 2024-09-10 DIAGNOSIS — E78.49 OTHER HYPERLIPIDEMIA: ICD-10-CM

## 2024-09-10 RX ORDER — ROSUVASTATIN CALCIUM 20 MG/1
20 TABLET, COATED ORAL NIGHTLY
Qty: 90 TABLET | Refills: 0 | OUTPATIENT
Start: 2024-09-10

## 2024-09-13 DIAGNOSIS — E78.49 OTHER HYPERLIPIDEMIA: ICD-10-CM

## 2024-09-13 RX ORDER — ROSUVASTATIN CALCIUM 20 MG/1
20 TABLET, COATED ORAL NIGHTLY
Qty: 90 TABLET | Refills: 0 | Status: SHIPPED | OUTPATIENT
Start: 2024-09-13 | End: 2024-12-12

## 2024-10-14 ENCOUNTER — OFFICE VISIT (OUTPATIENT)
Age: 59
End: 2024-10-14
Payer: COMMERCIAL

## 2024-10-14 VITALS
HEART RATE: 73 BPM | BODY MASS INDEX: 37.77 KG/M2 | DIASTOLIC BLOOD PRESSURE: 82 MMHG | SYSTOLIC BLOOD PRESSURE: 120 MMHG | HEIGHT: 69 IN | WEIGHT: 255 LBS

## 2024-10-14 DIAGNOSIS — M25.562 ACUTE PAIN OF LEFT KNEE: Primary | ICD-10-CM

## 2024-10-14 DIAGNOSIS — S80.02XA CONTUSION OF LEFT KNEE, INITIAL ENCOUNTER: ICD-10-CM

## 2024-10-14 PROCEDURE — 99203 OFFICE O/P NEW LOW 30 MIN: CPT | Performed by: PHYSICAL MEDICINE & REHABILITATION

## 2024-10-14 NOTE — PROGRESS NOTES
Zoe Sykes, DO  [facility]  [address] [place] [address]  Phone: [phone]  Fax: [phone]    PCP: Roverto Gonzalez MD  Date of visit: 10/14/24    Chief Complaint   Patient presents with    Knee Pain     Left knee pain     Ortho walk in     [facility] Rudy OROZCO is a 59 y.o. male with past medical history as below who presents with left knee pain for 1.5 week(s). There was a sudden onset of pain after falling on concrete landing on knees. Now, the pain is intermittent and happens when touching the area. No pain with ROM, weight bearing.  The pain (using VAS) is rated Pain Score:   1/10, is described as tender to touch, and is located in the patella. The pain is better with  not touching it .  The pain is worse with  palpation . There is no associated numbness/tingling. There is no weakness.   Has been icing. There is bruising in the left knee. No swelling.     The prior workup has included: none       No Known Allergies    Current Outpatient Medications   Medication Sig Dispense Refill    rosuvastatin (CRESTOR) 20 MG tablet Take 1 tablet by mouth nightly 90 tablet 0    fluticasone (VERAMYST) 27.5 MCG/SPRAY nasal spray 2 sprays by Nasal route      QUEtiapine (SEROQUEL) 50 MG tablet take 1 tablet by mouth once daily at bedtime      esomeprazole (NEXIUM) 40 MG delayed release capsule Take 1 capsule by mouth every morning (before breakfast) (Patient taking differently: Take 20 mg by mouth nightly) 90 capsule 1    divalproex (DEPAKOTE ER) 500 MG extended release tablet Take 2 tablets by mouth nightly      celecoxib (CELEBREX) 200 MG capsule Take 1 capsule by mouth daily (Patient not taking: Reported on 6/12/2024) 30 capsule 0     No current facility-administered medications for this visit.       Past Medical History:   Diagnosis Date    Asthma     questionable DX    Bipolar 1 disorder (HCC)     Cardiomyopathy (HCC) 2008    d/t a viral

## 2024-10-15 ENCOUNTER — TELEPHONE (OUTPATIENT)
Dept: PHYSICAL MEDICINE AND REHAB | Age: 59
End: 2024-10-15

## 2024-10-15 NOTE — TELEPHONE ENCOUNTER
----- Message from Dr. Zoe Sykes DO sent at 10/15/2024  9:04 AM EDT -----  Please call patient with x-ray result- no fracture or acute abnormality of the knee.

## 2024-10-28 SDOH — HEALTH STABILITY: PHYSICAL HEALTH: ON AVERAGE, HOW MANY MINUTES DO YOU ENGAGE IN EXERCISE AT THIS LEVEL?: 60 MIN

## 2024-10-28 SDOH — HEALTH STABILITY: PHYSICAL HEALTH: ON AVERAGE, HOW MANY DAYS PER WEEK DO YOU ENGAGE IN MODERATE TO STRENUOUS EXERCISE (LIKE A BRISK WALK)?: 4 DAYS

## 2024-10-29 ENCOUNTER — OFFICE VISIT (OUTPATIENT)
Dept: ORTHOPEDIC SURGERY | Age: 59
End: 2024-10-29
Payer: COMMERCIAL

## 2024-10-29 VITALS — BODY MASS INDEX: 36.58 KG/M2 | WEIGHT: 247 LBS | HEIGHT: 69 IN

## 2024-10-29 DIAGNOSIS — S80.02XA CONTUSION OF LEFT KNEE, INITIAL ENCOUNTER: Primary | ICD-10-CM

## 2024-10-29 PROCEDURE — 99213 OFFICE O/P EST LOW 20 MIN: CPT | Performed by: ORTHOPAEDIC SURGERY

## 2024-10-29 NOTE — PROGRESS NOTES
extremities.      Musculoskeletal:  Gait: antalgic; examination of the nails and digits reveal no cyanosis or clubbing.    Lumbar exam:  On visual inspection, there is not deformity of the spine.   full range of motion, no tenderness, palpable spasm or pain on motion. Special tests: Straight Leg Raise negative, Wanda test negative.      Hip exam:   Upon inspection, there is not deformity noted.  Upon palpation there is not tenderness.  ROM: is  full and symmetrical.   Strength: Hip Flexors 5/5; Hip Abductors 5/5; Hip Adduction 5/5.     Knee exam:  Left knee exam shows;  range of motion of R. Knee is 0 to 120, and L. Knee is 0 to 120. The patient does have  pain on motion, effusion is mild, there is tenderness over the  anterior region, there are not any masses, there is not ligamentous instability, there is not  deformity noted.    Knee exam: neither positive for moderate crepitations, some mild tenderness laxity is not noted with stress.  There is not a popliteal cyst.    R. Knee:  Lachman's negative, Anterior Drawer negative, Posterior Drawer negative  Todd's negative, Thallasy  negative,   PF grind test negative, Apprehension test negative, Patellar J sign  negative  L. Knee:  Lachman's negative, Anterior Drawer negative, Posterior Drawer negative  Todd's negative, Thallasy  negative,   PF grind test negative, Apprehension test negative,  Patellar J sign  negative    Xray Exam:  No acute fracture  Radiographic findings reviewed with patient    Assessment:  Encounter Diagnoses   Name Primary?    Contusion of left knee, initial encounter Yes       Plan:  Natural history and expected course discussed. Questions answered.  Educational materials distributed.  Rest, ice, compression, and elevation (RICE) therapy.  Reduction in offending activity.  Patellar compression sleeve.  OTC analgesics as needed.    Follow up 6 weeks, if not better will order MRI

## 2024-12-11 DIAGNOSIS — E78.49 OTHER HYPERLIPIDEMIA: ICD-10-CM

## 2024-12-11 RX ORDER — ROSUVASTATIN CALCIUM 20 MG/1
20 TABLET, COATED ORAL NIGHTLY
Qty: 90 TABLET | Refills: 0 | OUTPATIENT
Start: 2024-12-11

## 2025-01-09 ENCOUNTER — OFFICE VISIT (OUTPATIENT)
Dept: ORTHOPEDIC SURGERY | Age: 60
End: 2025-01-09
Payer: COMMERCIAL

## 2025-01-09 VITALS — HEIGHT: 69 IN | WEIGHT: 247 LBS | BODY MASS INDEX: 36.58 KG/M2

## 2025-01-09 DIAGNOSIS — S83.242A ACUTE MEDIAL MENISCUS TEAR, LEFT, INITIAL ENCOUNTER: Primary | ICD-10-CM

## 2025-01-09 PROCEDURE — 99213 OFFICE O/P EST LOW 20 MIN: CPT | Performed by: ORTHOPAEDIC SURGERY

## 2025-01-09 NOTE — PROGRESS NOTES
Chief Complaint   Patient presents with    Knee Pain     Left Knee F/U, pain increasing, no known recent injury,  wearing knee brace, states of clicking and instability.         Subjective:     Patient ID: Osiel Grant II is a 60 y.o..  male    Knee Pain  Patient complains of left knee pain. He stated he is worse than when I saw him last. He does wear a knee brace and takes OTC medications without relief. He c/o of the knee catching and locking.     Past Medical History:   Diagnosis Date    Asthma     questionable DX    Bipolar 1 disorder (HCC)     Cardiomyopathy (HCC) 2008    d/t a viral myocarditis   was treated  no problems currently    Chronic obstructive pulmonary disease (HCC) 1/18/2021    COVID-19 01/2021    hospitalized 1.5 weeks     was on 18liters of O2  & did go home on oxygen for approx 1 week .  no residual issues    Depression     GERD (gastroesophageal reflux disease)     Hyperlipemia     Moderate persistent asthma with exacerbation 11/18/2019    Pneumonia due to COVID-19 virus 01/17/2021    TFCC (triangular fibrocartilage complex) tear 12/15/2014     Past Surgical History:   Procedure Laterality Date    ANESTHESIA NERVE BLOCK Left 01/24/2019    LEFT C3,4,5 MEDIAL BRANCH BLOCK performed by Grupo Lundy MD at [facility] OR    CARDIAC CATHETERIZATION      CARPAL TUNNEL RELEASE Right     CARPAL TUNNEL RELEASE Left 08/08/2023    LEFT WRIST CARPAL TUNNEL RELEASE-8/8/23 performed by Rashaun Adams DO at [facility] OR    COLONOSCOPY      FINGER SURGERY Left 1983    pinkie    HERNIA REPAIR      inguinal and umbical    JOINT REPLACEMENT  Oct 2019    LEFT hip replacement    NERVE BLOCK Left 11/01/2018    medial branch block    NERVE BLOCK Left 01/24/2019    cervical mbb    OTHER SURGICAL HISTORY Right 02/2014    repair tricep tendon    VA NJX DX/THER AGT PVRT FACET JT CRV/THRC 1 LEVEL Left 11/01/2018    LEFT C3 C4 C5 MEDIAL BRANCH BLOCK performed by Grupo Lundy MD at [facility] OR

## 2025-01-13 ENCOUNTER — HOSPITAL ENCOUNTER (OUTPATIENT)
Age: 60
Discharge: HOME | End: 2025-01-13
Payer: COMMERCIAL

## 2025-01-13 DIAGNOSIS — E78.49 OTHER HYPERLIPIDEMIA: ICD-10-CM

## 2025-01-13 LAB
BILIRUB UR QL STRIP: NEGATIVE
CLARITY UR: CLEAR
COLOR UR: ABNORMAL
GLUCOSE UR STRIP-MCNC: NEGATIVE MG/DL
HGB UR QL STRIP.AUTO: ABNORMAL
KETONES UR STRIP-MCNC: 15 MG/DL
LEUKOCYTE ESTERASE UR QL STRIP: NEGATIVE
NITRITE UR QL STRIP: NEGATIVE
PH UR STRIP: 6 [PH] (ref 5–9)
PROT UR STRIP-MCNC: NEGATIVE MG/DL
RBC #/AREA URNS HPF: ABNORMAL /HPF
SP GR UR STRIP: 1.02 (ref 1–1.03)
UROBILINOGEN UR STRIP-ACNC: 0.2 EU/DL (ref 0–1)
WBC #/AREA URNS HPF: ABNORMAL /HPF

## 2025-01-13 PROCEDURE — 36415 COLL VENOUS BLD VENIPUNCTURE: CPT

## 2025-01-13 PROCEDURE — 80061 LIPID PANEL: CPT

## 2025-01-13 PROCEDURE — 80053 COMPREHEN METABOLIC PANEL: CPT

## 2025-01-13 PROCEDURE — 81001 URINALYSIS AUTO W/SCOPE: CPT

## 2025-01-14 LAB
ALBUMIN SERPL-MCNC: 4.6 G/DL (ref 3.5–5.2)
ALP SERPL-CCNC: 80 U/L (ref 40–129)
ALT SERPL-CCNC: 40 U/L (ref 0–40)
ANION GAP SERPL CALCULATED.3IONS-SCNC: 10 MMOL/L (ref 7–16)
AST SERPL-CCNC: 30 U/L (ref 0–39)
BILIRUB SERPL-MCNC: 0.4 MG/DL (ref 0–1.2)
BUN SERPL-MCNC: 20 MG/DL (ref 6–23)
CALCIUM SERPL-MCNC: 9.2 MG/DL (ref 8.6–10.2)
CHLORIDE SERPL-SCNC: 102 MMOL/L (ref 98–107)
CHOLEST SERPL-MCNC: 175 MG/DL
CO2 SERPL-SCNC: 24 MMOL/L (ref 22–29)
CREAT SERPL-MCNC: 0.9 MG/DL (ref 0.7–1.2)
GFR, ESTIMATED: >90 ML/MIN/1.73M2
GLUCOSE P FAST SERPL-MCNC: 88 MG/DL (ref 74–99)
HDLC SERPL-MCNC: 48 MG/DL
LDLC SERPL CALC-MCNC: 97 MG/DL
POTASSIUM SERPL-SCNC: 4.3 MMOL/L (ref 3.5–5)
PROT SERPL-MCNC: 7.1 G/DL (ref 6.4–8.3)
SODIUM SERPL-SCNC: 136 MMOL/L (ref 132–146)
TRIGL SERPL-MCNC: 148 MG/DL
VLDLC SERPL CALC-MCNC: 30 MG/DL

## 2025-01-15 ENCOUNTER — OFFICE VISIT (OUTPATIENT)
Dept: FAMILY MEDICINE CLINIC | Age: 60
End: 2025-01-15

## 2025-01-15 VITALS
SYSTOLIC BLOOD PRESSURE: 124 MMHG | OXYGEN SATURATION: 98 % | HEIGHT: 69 IN | DIASTOLIC BLOOD PRESSURE: 72 MMHG | TEMPERATURE: 98.2 F | HEART RATE: 60 BPM | BODY MASS INDEX: 36.5 KG/M2 | WEIGHT: 246.4 LBS

## 2025-01-15 DIAGNOSIS — K21.9 GASTROESOPHAGEAL REFLUX DISEASE, UNSPECIFIED WHETHER ESOPHAGITIS PRESENT: ICD-10-CM

## 2025-01-15 DIAGNOSIS — E78.49 OTHER HYPERLIPIDEMIA: Primary | ICD-10-CM

## 2025-01-15 DIAGNOSIS — Z23 FLU VACCINE NEED: ICD-10-CM

## 2025-01-15 RX ORDER — ROSUVASTATIN CALCIUM 20 MG/1
20 TABLET, COATED ORAL EVERY OTHER DAY
Qty: 45 TABLET | Refills: 0 | Status: SHIPPED | OUTPATIENT
Start: 2025-01-15 | End: 2025-04-15

## 2025-01-15 SDOH — ECONOMIC STABILITY: FOOD INSECURITY: WITHIN THE PAST 12 MONTHS, YOU WORRIED THAT YOUR FOOD WOULD RUN OUT BEFORE YOU GOT MONEY TO BUY MORE.: NEVER TRUE

## 2025-01-15 SDOH — ECONOMIC STABILITY: FOOD INSECURITY: WITHIN THE PAST 12 MONTHS, THE FOOD YOU BOUGHT JUST DIDN'T LAST AND YOU DIDN'T HAVE MONEY TO GET MORE.: NEVER TRUE

## 2025-01-15 ASSESSMENT — PATIENT HEALTH QUESTIONNAIRE - PHQ9
1. LITTLE INTEREST OR PLEASURE IN DOING THINGS: NOT AT ALL
9. THOUGHTS THAT YOU WOULD BE BETTER OFF DEAD, OR OF HURTING YOURSELF: NOT AT ALL
5. POOR APPETITE OR OVEREATING: NOT AT ALL
8. MOVING OR SPEAKING SO SLOWLY THAT OTHER PEOPLE COULD HAVE NOTICED. OR THE OPPOSITE, BEING SO FIGETY OR RESTLESS THAT YOU HAVE BEEN MOVING AROUND A LOT MORE THAN USUAL: NOT AT ALL
SUM OF ALL RESPONSES TO PHQ QUESTIONS 1-9: 0
7. TROUBLE CONCENTRATING ON THINGS, SUCH AS READING THE NEWSPAPER OR WATCHING TELEVISION: NOT AT ALL
10. IF YOU CHECKED OFF ANY PROBLEMS, HOW DIFFICULT HAVE THESE PROBLEMS MADE IT FOR YOU TO DO YOUR WORK, TAKE CARE OF THINGS AT HOME, OR GET ALONG WITH OTHER PEOPLE: NOT DIFFICULT AT ALL
SUM OF ALL RESPONSES TO PHQ QUESTIONS 1-9: 0
6. FEELING BAD ABOUT YOURSELF - OR THAT YOU ARE A FAILURE OR HAVE LET YOURSELF OR YOUR FAMILY DOWN: NOT AT ALL
SUM OF ALL RESPONSES TO PHQ QUESTIONS 1-9: 0
4. FEELING TIRED OR HAVING LITTLE ENERGY: NOT AT ALL
2. FEELING DOWN, DEPRESSED OR HOPELESS: NOT AT ALL
3. TROUBLE FALLING OR STAYING ASLEEP: NOT AT ALL
SUM OF ALL RESPONSES TO PHQ QUESTIONS 1-9: 0
SUM OF ALL RESPONSES TO PHQ9 QUESTIONS 1 & 2: 0

## 2025-01-15 ASSESSMENT — ENCOUNTER SYMPTOMS
BLOOD IN STOOL: 0
NAUSEA: 0
EYE DISCHARGE: 0
ABDOMINAL PAIN: 0
SINUS PAIN: 0
EYE PAIN: 0
SHORTNESS OF BREATH: 0
SORE THROAT: 0

## 2025-01-15 NOTE — PROGRESS NOTES
Chief Complaint   Patient presents with    Hyperlipidemia     Pt here for follow up on hyperlipidemia, pt reports feeling good. Pt taking 10 mg of Crestor     Discuss Labs     Completed on 01/13/2025     Health Maintenance     Flu vaccine - today        HPI:  Patient is here for follow-up    Feeling okay    Following wt watcher program    Following with Dr Adams- Left knee issues    Had MRI- done     Allergy and Medications are reviewed and updated.  Past Medical History, Surgical History, and Family History has been reviewed and updated.    Review of Systems:  Review of Systems   Constitutional:  Negative for chills and fever.   HENT:  Negative for congestion, sinus pain and sore throat.    Eyes:  Negative for pain and discharge.   Respiratory:  Negative for shortness of breath (No new SOb).    Cardiovascular:  Negative for chest pain.   Gastrointestinal:  Negative for abdominal pain, blood in stool and nausea.   Genitourinary:  Negative for flank pain and frequency.   Musculoskeletal:  Negative for neck pain.   Hematological:  Does not bruise/bleed easily.   Psychiatric/Behavioral:  Negative for suicidal ideas.          Vitals:    01/15/25 1453   BP: 124/72   Position: Sitting   Pulse: 60   Temp: 98.2 °F (36.8 °C)   TempSrc: Temporal   SpO2: 98%   Weight: 111.8 kg (246 lb 6.4 oz)   Height: 1.753 m (5' 9\")       Physical Exam  Vitals reviewed.   Constitutional:       Appearance: He is well-developed.   HENT:      Head: Normocephalic and atraumatic.   Eyes:      Conjunctiva/sclera: Conjunctivae normal.      Pupils: Pupils are equal, round, and reactive to light.   Neck:      Vascular: No JVD.   Cardiovascular:      Rate and Rhythm: Normal rate and regular rhythm.   Pulmonary:      Effort: Pulmonary effort is normal.      Breath sounds: Normal breath sounds.   Abdominal:      General: Bowel sounds are normal.      Palpations: Abdomen is soft.   Musculoskeletal:         General: Normal range of motion.   Skin:

## 2025-01-17 ENCOUNTER — OFFICE VISIT (OUTPATIENT)
Age: 60
End: 2025-01-17
Payer: COMMERCIAL

## 2025-01-17 VITALS — HEIGHT: 69 IN | WEIGHT: 246 LBS | BODY MASS INDEX: 36.43 KG/M2

## 2025-01-17 DIAGNOSIS — S60.10XA SUBUNGUAL HEMATOMA OF FINGER OF LEFT HAND, INITIAL ENCOUNTER: Primary | ICD-10-CM

## 2025-01-17 DIAGNOSIS — S63.639A: ICD-10-CM

## 2025-01-17 PROCEDURE — 99203 OFFICE O/P NEW LOW 30 MIN: CPT | Performed by: FAMILY MEDICINE

## 2025-01-17 RX ORDER — NAPROXEN 500 MG/1
500 TABLET ORAL 2 TIMES DAILY WITH MEALS
Qty: 28 TABLET | Refills: 0 | Status: SHIPPED | OUTPATIENT
Start: 2025-01-17 | End: 2025-01-31

## 2025-01-17 ASSESSMENT — ENCOUNTER SYMPTOMS
VOMITING: 0
CONSTIPATION: 0
DIARRHEA: 0
CHEST TIGHTNESS: 0
SHORTNESS OF BREATH: 0
NAUSEA: 0
ABDOMINAL PAIN: 0
COUGH: 0

## 2025-01-17 NOTE — PROGRESS NOTES
[facility]  PRIMARY CARE SPORTS MEDICINE  DATE OF VISIT : 2025    Patient : Osiel Grant II  Age : 60 y.o.   : 1965  MRN : [id number]   ______________________________________________________________________    Chief Complaint :   Chief Complaint   Patient presents with    Hand Injury     Patient presents today for left index finger injury. Patient tripped over the  door on 25 which caused him to smash his finger into the counter top. Patient heard a crack when it happened. Patient states he drilled a hole into his nail bed to relief the pressure but he states nothing came out. Patient is RHD. Pain is described as throbbing. Pain is made worse when hitting it into something. Pain is rated as a 8-10/10 and si constant. Patient is taking otc medications for relief.        HPI : Osiel Grant II is 60 y.o. right hand dominant male who presented to the clinic for evaluation of left index pain.     Today 2025, he says the pain started yesterday.  Patient was at home and tripped over the  smashing his index finger into the [place] countertop.  Patient said he heard a \"crack\" at that time.  He noticed bleeding underneath his fingernail and drilled a hole into his nailbed which provided some relief.  He currently describes the pain as throbbing and rates it as a 8 out of 10.  The pain is constant in nature.  He is not tried any OTC meds at this time.  He denies numbness, tingling.    ROS:  All pertinent positive symptoms are included in the history of present illness.    Review of Systems   Constitutional:  Negative for chills and fever.   Respiratory:  Negative for cough, chest tightness and shortness of breath.    Cardiovascular:  Negative for chest pain and palpitations.   Gastrointestinal:  Negative for abdominal pain, constipation, diarrhea, nausea and vomiting.   Genitourinary:  Negative for dysuria and frequency.   Musculoskeletal:  Positive for joint swelling (L

## 2025-04-25 DIAGNOSIS — E78.49 OTHER HYPERLIPIDEMIA: ICD-10-CM

## 2025-04-25 RX ORDER — ROSUVASTATIN CALCIUM 20 MG/1
20 TABLET, COATED ORAL EVERY OTHER DAY
Qty: 45 TABLET | Refills: 0 | OUTPATIENT
Start: 2025-04-25 | End: 2025-07-24

## 2025-05-03 DIAGNOSIS — E78.49 OTHER HYPERLIPIDEMIA: ICD-10-CM

## 2025-05-05 RX ORDER — ROSUVASTATIN CALCIUM 20 MG/1
20 TABLET, COATED ORAL EVERY OTHER DAY
Qty: 45 TABLET | Refills: 0 | Status: SHIPPED | OUTPATIENT
Start: 2025-05-05 | End: 2025-08-03

## 2025-05-05 NOTE — TELEPHONE ENCOUNTER
Name of Medication(s) Requested:  Requested Prescriptions     Pending Prescriptions Disp Refills    rosuvastatin (CRESTOR) 20 MG tablet 45 tablet 0     Sig: Take 1 tablet by mouth every other day       Medication is on current medication list Yes    Dosage and directions were verified? Yes    Quantity verified: 90 day supply     Pharmacy Verified?  Yes    Last Appointment:  1/15/2025    Future appts:  Future Appointments   Date Time Provider Department Center   5/15/2025  3:00 PM Roverto Gonzalez MD [facility]        (If no appt send self scheduling link. .REFILLAPPT)  Scheduling request sent?     [] Yes  [x] No    Does patient need updated?  [] Yes  [x] No

## 2025-05-11 ENCOUNTER — HOSPITAL ENCOUNTER (OUTPATIENT)
Age: 60
Discharge: HOME | End: 2025-05-11
Payer: COMMERCIAL

## 2025-05-11 DIAGNOSIS — E78.49 OTHER HYPERLIPIDEMIA: ICD-10-CM

## 2025-05-11 DIAGNOSIS — K21.9 GASTROESOPHAGEAL REFLUX DISEASE, UNSPECIFIED WHETHER ESOPHAGITIS PRESENT: ICD-10-CM

## 2025-05-11 LAB
ALBUMIN SERPL-MCNC: 4.3 G/DL (ref 3.5–5.2)
ALP SERPL-CCNC: 76 U/L (ref 40–129)
ALT SERPL-CCNC: 41 U/L (ref 0–40)
ANION GAP SERPL CALCULATED.3IONS-SCNC: 11 MMOL/L (ref 7–16)
AST SERPL-CCNC: 32 U/L (ref 0–39)
BASOPHILS # BLD: 0.03 K/UL (ref 0–0.2)
BASOPHILS NFR BLD: 1 % (ref 0–2)
BILIRUB SERPL-MCNC: 0.4 MG/DL (ref 0–1.2)
BILIRUB UR QL STRIP: NEGATIVE
BUN SERPL-MCNC: 16 MG/DL (ref 6–23)
CALCIUM SERPL-MCNC: 9 MG/DL (ref 8.6–10.2)
CHLORIDE SERPL-SCNC: 101 MMOL/L (ref 98–107)
CHOLEST SERPL-MCNC: 246 MG/DL
CLARITY UR: CLEAR
CO2 SERPL-SCNC: 26 MMOL/L (ref 22–29)
COLOR UR: YELLOW
CREAT SERPL-MCNC: 0.9 MG/DL (ref 0.7–1.2)
EOSINOPHIL # BLD: 0.16 K/UL (ref 0.05–0.5)
EOSINOPHILS RELATIVE PERCENT: 3 % (ref 0–6)
ERYTHROCYTE [DISTWIDTH] IN BLOOD BY AUTOMATED COUNT: 13.2 % (ref 11.5–15)
GFR, ESTIMATED: >90 ML/MIN/1.73M2
GLUCOSE P FAST SERPL-MCNC: 93 MG/DL (ref 74–99)
GLUCOSE UR STRIP-MCNC: NEGATIVE MG/DL
HCT VFR BLD AUTO: 44.3 % (ref 37–54)
HDLC SERPL-MCNC: 47 MG/DL
HGB BLD-MCNC: 15.2 G/DL (ref 12.5–16.5)
HGB UR QL STRIP.AUTO: NEGATIVE
IMM GRANULOCYTES # BLD AUTO: <0.03 K/UL (ref 0–0.58)
IMM GRANULOCYTES NFR BLD: 0 % (ref 0–5)
KETONES UR STRIP-MCNC: NEGATIVE MG/DL
LDLC SERPL CALC-MCNC: 174 MG/DL
LEUKOCYTE ESTERASE UR QL STRIP: NEGATIVE
LYMPHOCYTES NFR BLD: 1.58 K/UL (ref 1.5–4)
LYMPHOCYTES RELATIVE PERCENT: 27 % (ref 20–42)
MCH RBC QN AUTO: 30.6 PG (ref 26–35)
MCHC RBC AUTO-ENTMCNC: 34.3 G/DL (ref 32–34.5)
MCV RBC AUTO: 89.1 FL (ref 80–99.9)
MONOCYTES NFR BLD: 0.62 K/UL (ref 0.1–0.95)
MONOCYTES NFR BLD: 10 % (ref 2–12)
NEUTROPHILS NFR BLD: 60 % (ref 43–80)
NEUTS SEG NFR BLD: 3.54 K/UL (ref 1.8–7.3)
NITRITE UR QL STRIP: NEGATIVE
PH UR STRIP: 6 [PH] (ref 5–8)
PLATELET # BLD AUTO: 214 K/UL (ref 130–450)
PMV BLD AUTO: 10.3 FL (ref 7–12)
POTASSIUM SERPL-SCNC: 4.2 MMOL/L (ref 3.5–5)
PROT SERPL-MCNC: 7 G/DL (ref 6.4–8.3)
PROT UR STRIP-MCNC: NEGATIVE MG/DL
RBC # BLD AUTO: 4.97 M/UL (ref 3.8–5.8)
RBC #/AREA URNS HPF: NORMAL /HPF
SODIUM SERPL-SCNC: 138 MMOL/L (ref 132–146)
SP GR UR STRIP: 1.01 (ref 1–1.03)
TRIGL SERPL-MCNC: 126 MG/DL
TSH SERPL DL<=0.05 MIU/L-ACNC: 2.94 UIU/ML (ref 0.27–4.2)
UROBILINOGEN UR STRIP-ACNC: 0.2 EU/DL (ref 0–1)
VLDLC SERPL CALC-MCNC: 25 MG/DL
WBC #/AREA URNS HPF: NORMAL /HPF
WBC OTHER # BLD: 5.9 K/UL (ref 4.5–11.5)

## 2025-05-11 PROCEDURE — 81001 URINALYSIS AUTO W/SCOPE: CPT

## 2025-05-11 PROCEDURE — 85025 COMPLETE CBC W/AUTO DIFF WBC: CPT

## 2025-05-11 PROCEDURE — 80053 COMPREHEN METABOLIC PANEL: CPT

## 2025-05-11 PROCEDURE — 84443 ASSAY THYROID STIM HORMONE: CPT

## 2025-05-11 PROCEDURE — 36415 COLL VENOUS BLD VENIPUNCTURE: CPT

## 2025-05-11 PROCEDURE — 80061 LIPID PANEL: CPT

## 2025-05-15 ENCOUNTER — OFFICE VISIT (OUTPATIENT)
Dept: FAMILY MEDICINE CLINIC | Age: 60
End: 2025-05-15
Payer: COMMERCIAL

## 2025-05-15 VITALS
TEMPERATURE: 97.6 F | DIASTOLIC BLOOD PRESSURE: 80 MMHG | OXYGEN SATURATION: 98 % | BODY MASS INDEX: 33.12 KG/M2 | SYSTOLIC BLOOD PRESSURE: 128 MMHG | WEIGHT: 223.6 LBS | HEIGHT: 69 IN | HEART RATE: 71 BPM

## 2025-05-15 DIAGNOSIS — F31.81 BIPOLAR 2 DISORDER (HCC): ICD-10-CM

## 2025-05-15 DIAGNOSIS — E78.49 OTHER HYPERLIPIDEMIA: Primary | ICD-10-CM

## 2025-05-15 DIAGNOSIS — K21.9 GASTROESOPHAGEAL REFLUX DISEASE, UNSPECIFIED WHETHER ESOPHAGITIS PRESENT: ICD-10-CM

## 2025-05-15 PROCEDURE — 99214 OFFICE O/P EST MOD 30 MIN: CPT | Performed by: INTERNAL MEDICINE

## 2025-05-15 RX ORDER — ROSUVASTATIN CALCIUM 20 MG/1
20 TABLET, COATED ORAL NIGHTLY
Qty: 90 TABLET | Refills: 0 | Status: SHIPPED | OUTPATIENT
Start: 2025-05-15 | End: 2025-08-13

## 2025-05-15 ASSESSMENT — ENCOUNTER SYMPTOMS
EYE DISCHARGE: 0
SINUS PAIN: 0
NAUSEA: 0
SORE THROAT: 0
SHORTNESS OF BREATH: 0
ABDOMINAL PAIN: 0
BLOOD IN STOOL: 0
EYE PAIN: 0

## 2025-05-15 NOTE — PROGRESS NOTES
Chief Complaint   Patient presents with    Hyperlipidemia     Here for follow up on hyperlipidemia, pt reports feeling good.     Discuss Labs     Completed on 05/11/2025     Lightheadedness     C/o Lightheadedness x 2-3 months. With exertion gets lightheadedness last a few minutes.         HPI:  Patient is here for follow-up     Pt is feeling ok    Labs noted    Was out of Crestor for about 3 weeks    Has resume the med      Allergy and Medications are reviewed and updated.  Past Medical History, Surgical History, and Family History has been reviewed and updated.    Review of Systems:  Review of Systems   Constitutional:  Negative for chills and fever.   HENT:  Negative for congestion, sinus pain and sore throat.    Eyes:  Negative for pain and discharge.   Respiratory:  Negative for shortness of breath (No new SOb).    Cardiovascular:  Negative for chest pain.   Gastrointestinal:  Negative for abdominal pain, blood in stool and nausea.   Genitourinary:  Negative for flank pain and frequency.   Musculoskeletal:  Negative for neck pain.   Hematological:  Does not bruise/bleed easily.   Psychiatric/Behavioral:  Negative for suicidal ideas.          Vitals:    05/15/25 1505   BP: 128/80   BP Site: Left Upper Arm   Patient Position: Sitting   Pulse: 71   Temp: 97.6 °F (36.4 °C)   TempSrc: Temporal   SpO2: 98%   Weight: 101.4 kg (223 lb 9.6 oz)   Height: 1.753 m (5' 9\")       Physical Exam  Vitals reviewed.   Constitutional:       Appearance: He is well-developed.   HENT:      Head: Normocephalic and atraumatic.   Eyes:      Conjunctiva/sclera: Conjunctivae normal.      Pupils: Pupils are equal, round, and reactive to light.   Neck:      Vascular: No JVD.   Cardiovascular:      Rate and Rhythm: Normal rate and regular rhythm.   Pulmonary:      Effort: Pulmonary effort is normal.      Breath sounds: Normal breath sounds.   Abdominal:      General: Bowel sounds are normal.      Palpations: Abdomen is soft.

## 2025-06-19 DIAGNOSIS — E78.49 OTHER HYPERLIPIDEMIA: ICD-10-CM

## 2025-06-20 ENCOUNTER — PATIENT MESSAGE (OUTPATIENT)
Dept: FAMILY MEDICINE CLINIC | Age: 60
End: 2025-06-20

## 2025-06-20 RX ORDER — ROSUVASTATIN CALCIUM 20 MG/1
20 TABLET, COATED ORAL NIGHTLY
Qty: 90 TABLET | Refills: 0 | Status: SHIPPED | OUTPATIENT
Start: 2025-06-20 | End: 2025-09-18

## 2025-06-20 NOTE — TELEPHONE ENCOUNTER
Spoke to the [facility] at [facility] they did receive both scripts sent for Crestor 20 mg 1 tablet daily. They are stating it is to early to refill I questioned why she said they directions are 1/2 tablet every other day therefore he is not due until 07/17/2025. Explained there is a direction change patient is now taking 1 tablet everyday as the new script reflects. [facility] stated she tried doing an over  ride and it is not working the patient would have to contact his insurance co or pay cash. Called and spoke to Osiel he will contact the pharmacy.

## 2025-06-20 NOTE — TELEPHONE ENCOUNTER
Name of Medication(s) Requested:  Requested Prescriptions     Pending Prescriptions Disp Refills    rosuvastatin (CRESTOR) 20 MG tablet 90 tablet 0     Sig: Take 1 tablet by mouth at bedtime       Medication is on current medication list Yes    Dosage and directions were verified? Yes    Quantity verified: 90 day supply     Pharmacy Verified?  Yes    Last Appointment:  5/15/2025    Future appts:  Future Appointments   Date Time Provider Department Center   8/18/2025  3:45 PM Roverto Gonzalez MD [facility] DE[facility]        (If no appt send self scheduling link. .REFILLAPPT)  Scheduling request sent?     [] Yes  [x] No    Does patient need updated?  [] Yes  [x] No

## 2025-08-15 ENCOUNTER — OFFICE VISIT (OUTPATIENT)
Dept: FAMILY MEDICINE CLINIC | Age: 60
End: 2025-08-15
Payer: COMMERCIAL

## 2025-08-15 VITALS
OXYGEN SATURATION: 98 % | DIASTOLIC BLOOD PRESSURE: 74 MMHG | HEART RATE: 88 BPM | TEMPERATURE: 98 F | BODY MASS INDEX: 33.03 KG/M2 | WEIGHT: 223 LBS | RESPIRATION RATE: 19 BRPM | HEIGHT: 69 IN | SYSTOLIC BLOOD PRESSURE: 112 MMHG

## 2025-08-15 DIAGNOSIS — J20.8 ACUTE BRONCHITIS DUE TO 2019 NOVEL CORONAVIRUS: Primary | ICD-10-CM

## 2025-08-15 DIAGNOSIS — U07.1 ACUTE BRONCHITIS DUE TO 2019 NOVEL CORONAVIRUS: Primary | ICD-10-CM

## 2025-08-15 DIAGNOSIS — U07.1 POSITIVE SELF-ADMINISTERED ANTIGEN TEST FOR COVID-19: ICD-10-CM

## 2025-08-15 PROCEDURE — 99213 OFFICE O/P EST LOW 20 MIN: CPT | Performed by: EMERGENCY MEDICINE

## 2025-08-15 RX ORDER — DEXTROMETHORPHAN HYDROBROMIDE AND PROMETHAZINE HYDROCHLORIDE 15; 6.25 MG/5ML; MG/5ML
5 SYRUP ORAL 4 TIMES DAILY PRN
Qty: 240 ML | Refills: 0 | Status: SHIPPED | OUTPATIENT
Start: 2025-08-15

## 2025-08-15 RX ORDER — METHYLPREDNISOLONE 4 MG/1
TABLET ORAL
Qty: 1 KIT | Refills: 0 | Status: SHIPPED | OUTPATIENT
Start: 2025-08-15

## 2025-08-15 ASSESSMENT — ENCOUNTER SYMPTOMS
BACK PAIN: 0
SHORTNESS OF BREATH: 0
COUGH: 1
NAUSEA: 0
ABDOMINAL PAIN: 0
SORE THROAT: 0
EYE DISCHARGE: 0
DIARRHEA: 0
WHEEZING: 0
CHEST TIGHTNESS: 1
EYE REDNESS: 0
VOMITING: 0
EYE PAIN: 0
SINUS PRESSURE: 0

## 2025-08-24 ENCOUNTER — HOSPITAL ENCOUNTER (OUTPATIENT)
Age: 60
Discharge: HOME | End: 2025-08-24
Payer: COMMERCIAL

## 2025-08-24 DIAGNOSIS — E78.49 OTHER HYPERLIPIDEMIA: ICD-10-CM

## 2025-08-24 LAB
ALBUMIN SERPL-MCNC: 3.9 G/DL (ref 3.5–5.2)
ALP SERPL-CCNC: 72 U/L (ref 40–129)
ALT SERPL-CCNC: 31 U/L (ref 0–50)
ANION GAP SERPL CALCULATED.3IONS-SCNC: 11 MMOL/L (ref 7–16)
AST SERPL-CCNC: 31 U/L (ref 0–50)
BILIRUB SERPL-MCNC: 0.4 MG/DL (ref 0–1.2)
BUN SERPL-MCNC: 16 MG/DL (ref 8–23)
CALCIUM SERPL-MCNC: 9 MG/DL (ref 8.8–10.2)
CHLORIDE SERPL-SCNC: 103 MMOL/L (ref 98–107)
CHOLEST SERPL-MCNC: 196 MG/DL
CO2 SERPL-SCNC: 25 MMOL/L (ref 22–29)
CREAT SERPL-MCNC: 0.8 MG/DL (ref 0.7–1.2)
DATE LAST DOSE: ABNORMAL
GFR, ESTIMATED: >90 ML/MIN/1.73M2
GLUCOSE P FAST SERPL-MCNC: 87 MG/DL (ref 74–99)
HDLC SERPL-MCNC: 56 MG/DL
LDLC SERPL CALC-MCNC: 113 MG/DL
POTASSIUM SERPL-SCNC: 4.8 MMOL/L (ref 3.5–5.1)
PROT SERPL-MCNC: 6.6 G/DL (ref 6.4–8.3)
SODIUM SERPL-SCNC: 138 MMOL/L (ref 136–145)
TME LAST DOSE: ABNORMAL H
TRIGL SERPL-MCNC: 135 MG/DL
VALPROATE SERPL-MCNC: 44 UG/ML (ref 50–100)
VANCOMYCIN DOSE: ABNORMAL MG
VLDLC SERPL CALC-MCNC: 27 MG/DL

## 2025-08-24 PROCEDURE — 80061 LIPID PANEL: CPT

## 2025-08-24 PROCEDURE — 80164 ASSAY DIPROPYLACETIC ACD TOT: CPT

## 2025-08-24 PROCEDURE — 36415 COLL VENOUS BLD VENIPUNCTURE: CPT

## 2025-08-24 PROCEDURE — 80053 COMPREHEN METABOLIC PANEL: CPT

## 2025-08-25 ENCOUNTER — OFFICE VISIT (OUTPATIENT)
Dept: FAMILY MEDICINE CLINIC | Age: 60
End: 2025-08-25
Payer: COMMERCIAL

## 2025-08-25 VITALS
OXYGEN SATURATION: 99 % | HEIGHT: 69 IN | TEMPERATURE: 98.3 F | DIASTOLIC BLOOD PRESSURE: 64 MMHG | HEART RATE: 77 BPM | BODY MASS INDEX: 33.18 KG/M2 | WEIGHT: 224 LBS | SYSTOLIC BLOOD PRESSURE: 116 MMHG

## 2025-08-25 DIAGNOSIS — E78.49 OTHER HYPERLIPIDEMIA: Primary | ICD-10-CM

## 2025-08-25 DIAGNOSIS — K21.9 GASTROESOPHAGEAL REFLUX DISEASE, UNSPECIFIED WHETHER ESOPHAGITIS PRESENT: ICD-10-CM

## 2025-08-25 DIAGNOSIS — F31.81 BIPOLAR 2 DISORDER (HCC): ICD-10-CM

## 2025-08-25 PROCEDURE — 99214 OFFICE O/P EST MOD 30 MIN: CPT | Performed by: INTERNAL MEDICINE

## 2025-08-25 RX ORDER — ROSUVASTATIN CALCIUM 20 MG/1
20 TABLET, COATED ORAL NIGHTLY
Qty: 90 TABLET | Refills: 0 | Status: SHIPPED | OUTPATIENT
Start: 2025-08-25 | End: 2025-11-23

## 2025-08-25 ASSESSMENT — ENCOUNTER SYMPTOMS
SINUS PAIN: 0
BLOOD IN STOOL: 0
EYE DISCHARGE: 0
SORE THROAT: 0
EYE PAIN: 0
NAUSEA: 0
SHORTNESS OF BREATH: 0
ABDOMINAL PAIN: 0

## (undated) DEVICE — BANDAGE ADH W1XL3IN NAT FAB WVN FLX DURABLE N ADH PD SEAL

## (undated) DEVICE — Z INACTIVE NO ACTIVE SUPPLIER APPLICATOR MEDICATED 26 CC TINT HI-LITE ORNG STRL CHLORAPREP

## (undated) DEVICE — 3 ML SYRINGE LUER-LOCK TIP: Brand: MONOJECT

## (undated) DEVICE — BANDAGE,GAUZE,BULKEE II,4.5"X4.1YD,STRL: Brand: MEDLINE

## (undated) DEVICE — Z DISCONTINUED APPLICATOR SURG PREP 0.35OZ 2% CHG 70% ISO ALC W/ HI LT

## (undated) DEVICE — SOLUTION IRRIG 1000ML 09% SOD CHL USP PIC PLAS CONTAINER

## (undated) DEVICE — NEEDLE HYPO 18GA L1.5IN PNK POLYPR HUB S STL THN WALL FILL

## (undated) DEVICE — NEEDLE SPNL 22GA L3.5IN BLK HUB S STL REG WALL FIT STYL W/

## (undated) DEVICE — CLOTH PREP W7.5XL7.5IN 2% CHG SKIN ALC AND RNS FREE

## (undated) DEVICE — HANDLE CVR PATENTED RETENTION DISC STRL LIGHT SHLD

## (undated) DEVICE — TIBURON EXTREMITY SHEET: Brand: CONVERTORS

## (undated) DEVICE — 12 ML SYRINGE,LUER-LOCK TIP: Brand: MONOJECT

## (undated) DEVICE — 3M™ RED DOT™ MONITORING ELECTRODE WITH FOAM TAPE AND STICKY GEL 2560, 50/BAG, 20/CASE, 72/PLT: Brand: RED DOT™

## (undated) DEVICE — NEEDLE HYPO 25GA L1.5IN BLU POLYPR HUB S STL REG BVL STR

## (undated) DEVICE — BASIC PACK: Brand: CONVERTORS

## (undated) DEVICE — DRESSING GZ XRFRM 4X4(25/BX 6BX/CS)

## (undated) DEVICE — 6 ML SYRINGE LUER-LOCK TIP: Brand: MONOJECT

## (undated) DEVICE — GAUZE,SPONGE,4"X4",12PLY,STERILE,LF,2'S: Brand: MEDLINE

## (undated) DEVICE — GLOVE ORANGE PI 8   MSG9080

## (undated) DEVICE — PEN: MARKING STD 100/CS: Brand: MEDICAL ACTION INDUSTRIES

## (undated) DEVICE — Z INACTIVE USE 2855128 SPONGE GZ 16 PLY WVN COT 4INX4IN  HHH

## (undated) DEVICE — Z INACTIVE USE 2863041 SPONGE GZ W4XL4IN 100% COT 16 PLY RADPQ HIGHLY ABSRB

## (undated) DEVICE — SUTURE NONABSORBABLE MONOFILAMENT 4-0 PS-2 18 IN BLU PROLENE 8682H

## (undated) DEVICE — TOWEL OR BLUEE 16X26IN ST 8 PACK ORB08 16X26ORTWL

## (undated) DEVICE — Z DISCONTINUED USE 2275686 GLOVE SURG SZ 8 L12IN FNGR THK13MIL WHT ISOLEX POLYISOPRENE

## (undated) DEVICE — GOWN SURG XL SMS FAB NONREINFORCED RAGLAN SLV HK LOOP CLSR

## (undated) DEVICE — INTENDED FOR TISSUE SEPARATION, AND OTHER PROCEDURES THAT REQUIRE A SHARP SURGICAL BLADE TO PUNCTURE OR CUT.: Brand: BARD-PARKER ® STAINLESS STEEL BLADES

## (undated) DEVICE — 1810 FOAM BLOCK NEEDLE COUNTER: Brand: DEVON

## (undated) DEVICE — 20 ML SYRINGE REGULAR TIP: Brand: MONOJECT

## (undated) DEVICE — HOOK LOCK LATEX FREE ELASTIC BANDAGE 3INX5YD

## (undated) DEVICE — PREP TRAY 10X5X2: Brand: MEDLINE INDUSTRIES, INC.

## (undated) DEVICE — GLOVE ORANGE PI 7 1/2   MSG9075

## (undated) DEVICE — GOWN,SIRUS,NON REINFRCD,LARGE,SET IN SL: Brand: MEDLINE